# Patient Record
Sex: MALE | Race: WHITE | NOT HISPANIC OR LATINO | Employment: OTHER | URBAN - NONMETROPOLITAN AREA
[De-identification: names, ages, dates, MRNs, and addresses within clinical notes are randomized per-mention and may not be internally consistent; named-entity substitution may affect disease eponyms.]

---

## 2017-03-29 ENCOUNTER — COMMUNICATION - GICH (OUTPATIENT)
Dept: FAMILY MEDICINE | Facility: OTHER | Age: 62
End: 2017-03-29

## 2017-03-29 DIAGNOSIS — G62.9 POLYNEUROPATHY: ICD-10-CM

## 2017-04-10 ENCOUNTER — HISTORY (OUTPATIENT)
Dept: EMERGENCY MEDICINE | Facility: OTHER | Age: 62
End: 2017-04-10

## 2017-04-17 ENCOUNTER — OFFICE VISIT - GICH (OUTPATIENT)
Dept: FAMILY MEDICINE | Facility: OTHER | Age: 62
End: 2017-04-17

## 2017-04-17 ENCOUNTER — HISTORY (OUTPATIENT)
Dept: FAMILY MEDICINE | Facility: OTHER | Age: 62
End: 2017-04-17

## 2017-04-17 DIAGNOSIS — S09.90XD UNSPECIFIED INJURY OF HEAD, SUBSEQUENT ENCOUNTER: ICD-10-CM

## 2017-04-17 DIAGNOSIS — G62.9 POLYNEUROPATHY: ICD-10-CM

## 2017-04-27 ENCOUNTER — OFFICE VISIT - GICH (OUTPATIENT)
Dept: FAMILY MEDICINE | Facility: OTHER | Age: 62
End: 2017-04-27

## 2017-04-27 ENCOUNTER — HISTORY (OUTPATIENT)
Dept: FAMILY MEDICINE | Facility: OTHER | Age: 62
End: 2017-04-27

## 2017-04-27 DIAGNOSIS — Z00.00 ENCOUNTER FOR GENERAL ADULT MEDICAL EXAMINATION WITHOUT ABNORMAL FINDINGS: ICD-10-CM

## 2017-04-27 DIAGNOSIS — K70.30 ALCOHOLIC CIRRHOSIS OF LIVER WITHOUT ASCITES (H): ICD-10-CM

## 2017-04-27 DIAGNOSIS — Z13.6 ENCOUNTER FOR SCREENING FOR CARDIOVASCULAR DISORDERS: ICD-10-CM

## 2017-04-27 DIAGNOSIS — F10.10 UNCOMPLICATED ALCOHOL ABUSE: ICD-10-CM

## 2017-04-27 DIAGNOSIS — G62.1 ALCOHOLIC POLYNEUROPATHY (H): ICD-10-CM

## 2017-04-27 LAB
A/G RATIO - HISTORICAL: 1.2 (ref 1–2)
ABSOLUTE BASOPHILS - HISTORICAL: 0.1 THOU/CU MM
ABSOLUTE EOSINOPHILS - HISTORICAL: 0 THOU/CU MM
ABSOLUTE LYMPHOCYTES - HISTORICAL: 1.5 THOU/CU MM (ref 0.9–2.9)
ABSOLUTE MONOCYTES - HISTORICAL: 0.4 THOU/CU MM
ABSOLUTE NEUTROPHILS - HISTORICAL: 7.1 THOU/CU MM (ref 1.7–7)
ALBUMIN SERPL-MCNC: 4.1 G/DL (ref 3.5–5.7)
ALP SERPL-CCNC: 70 IU/L (ref 34–104)
ALT (SGPT) - HISTORICAL: 25 IU/L (ref 7–52)
ANION GAP - HISTORICAL: 14 (ref 5–18)
AST SERPL-CCNC: 32 IU/L (ref 13–39)
BACTERIA URINE: NORMAL BACTERIA/HPF
BASOPHILS # BLD AUTO: 0.8 %
BILIRUB SERPL-MCNC: 0.8 MG/DL (ref 0.3–1)
BILIRUB UR QL: ABNORMAL
BUN SERPL-MCNC: 16 MG/DL (ref 7–25)
BUN/CREAT RATIO - HISTORICAL: 24
CALCIUM SERPL-MCNC: 9.8 MG/DL (ref 8.6–10.3)
CHLORIDE SERPLBLD-SCNC: 97 MMOL/L (ref 98–107)
CHOL/HDL RATIO - HISTORICAL: 3.3
CHOLESTEROL TOTAL: 211 MG/DL
CLARITY, URINE: CLEAR CLARITY
CO2 SERPL-SCNC: 24 MMOL/L (ref 21–31)
COLOR UR: ABNORMAL COLOR
CREAT SERPL-MCNC: 0.68 MG/DL (ref 0.7–1.3)
EOSINOPHIL NFR BLD AUTO: 0.4 %
EPITHELIAL CELLS: NORMAL EPI/HPF
ERYTHROCYTE [DISTWIDTH] IN BLOOD BY AUTOMATED COUNT: 14.3 % (ref 11.5–15.5)
GFR IF NOT AFRICAN AMERICAN - HISTORICAL: >60 ML/MIN/1.73M2
GLOBULIN - HISTORICAL: 3.4 G/DL (ref 2–3.7)
GLUCOSE SERPL-MCNC: 122 MG/DL (ref 70–105)
GLUCOSE URINE: NEGATIVE MG/DL
HCT VFR BLD AUTO: 42.9 % (ref 37–53)
HDLC SERPL-MCNC: 64 MG/DL (ref 23–92)
HEMOGLOBIN: 13.6 G/DL (ref 13.5–17.5)
HYALINE CASTS: NORMAL /LPF
INR - HISTORICAL: 1.1
KETONES UR QL: ABNORMAL MG/DL
LDLC SERPL CALC-MCNC: 129 MG/DL
LEUKOCYTE ESTERASE URINE: NEGATIVE
LYMPHOCYTES NFR BLD AUTO: 16.5 % (ref 20–44)
MCH RBC QN AUTO: 34.3 PG (ref 26–34)
MCHC RBC AUTO-ENTMCNC: 31.7 G/DL (ref 32–36)
MCV RBC AUTO: 108 FL (ref 80–100)
MONOCYTES NFR BLD AUTO: 4.3 %
NEUTROPHILS NFR BLD AUTO: 78.1 % (ref 42–72)
NITRITE UR QL STRIP: NEGATIVE
NON-HDL CHOLESTEROL - HISTORICAL: 147 MG/DL
OCCULT BLOOD,URINE - HISTORICAL: NEGATIVE
OTHER: NORMAL
PATIENT STATUS - HISTORICAL: ABNORMAL
PH UR: 5 [PH]
PLATELET # BLD AUTO: 298 THOU/CU MM (ref 140–440)
PMV BLD: 7.4 FL (ref 6.5–11)
POTASSIUM SERPL-SCNC: 4.5 MMOL/L (ref 3.5–5.1)
PROT SERPL-MCNC: 7.5 G/DL (ref 6.4–8.9)
PROTEIN QUALITATIVE,URINE - HISTORICAL: 30 MG/DL
PROTIME - HISTORICAL: 11.6 SEC (ref 11.9–15.2)
RBC - HISTORICAL: NORMAL /HPF
RED BLOOD COUNT - HISTORICAL: 3.96 MIL/CU MM (ref 4.3–5.9)
SODIUM SERPL-SCNC: 135 MMOL/L (ref 133–143)
SP GR UR STRIP: >=1.03
TRIGL SERPL-MCNC: 91 MG/DL
UROBILINOGEN,QUALITATIVE - HISTORICAL: NORMAL EU/DL
WBC - HISTORICAL: NORMAL /HPF
WHITE BLOOD COUNT - HISTORICAL: 9 THOU/CU MM (ref 4.5–11)

## 2018-01-04 NOTE — NURSING NOTE
Patient Information     Patient Name MRN Prasanna Suárez [id number] Male 1955      Nursing Note by Vita Early at 2017  1:15 PM     Author:  Vita Early Service:  (none) Author Type:  (none)     Filed:  2017  1:53 PM Encounter Date:  2017 Status:  Signed     :  Vita Early            Patient presents to the clinic for an annual review.  Vita Early LPN....................2017 1:16 PM

## 2018-01-04 NOTE — NURSING NOTE
Patient Information     Patient Name MRN Prasanna Suárez [id number] Male 1955      Nursing Note by Vita Early at 2017  2:45 PM     Author:  Vita Early Service:  (none) Author Type:  (none)     Filed:  2017  3:09 PM Encounter Date:  2017 Status:  Signed     :  Vita Early            Patient presents to the clinic for a follow up after a head injury 3 weeks ago.    Vita Early LPN....................2017 2:50 PM

## 2018-01-04 NOTE — PROGRESS NOTES
Patient Information     Patient Name MRN Prasanna Suárez [id number] Male 1955      Progress Notes by Kervin Hopson MD at 2017  1:15 PM     Author:  Kervin Hopson MD Service:  (none) Author Type:  Physician     Filed:  2017  2:11 PM Encounter Date:  2017 Status:  Signed     :  Kervin Hopson MD (Physician)            See hp

## 2018-01-04 NOTE — H&P
Patient Information     Patient Name MRN Sex Prasanna Jones [id number] Male 1955      H&P by Kervin Hopson MD at 2017  1:15 PM     Author:  Kervin Hopson MD Service:  (none) Author Type:  Physician     Filed:  2017  2:11 PM Encounter Date:  2017 Status:  Signed     :  Kervin Hopson MD (Physician)            Nursing Notes:   Vita Early  2017  1:53 PM  Signed  Patient presents to the clinic for an annual review.  Vita Early LPN....................2017 1:16 PM    Prasanna Alonso is a 62 y.o. male who presents for   Chief Complaint     Patient presents with       Medication Management      Med review     HPI: Mr. Alonso comes in for health care maintenance. He is drinking and has been for a prolonged time again- at least since November he thinks. The peripheral neuropathy is stable and the gabapentin makes a + difference. He notices if he misses a dose. He is healing from facial contusion from fall . HE has not had colonoscopy but does not want to pursue it at this time. I discussed increase risk of bleeding with biopsy and liver disease. INR a year ago was good. HE is due for lab today.   Past Medical History:     Diagnosis  Date     Chemical dependency (HC)     etoh, tob      DJD (degenerative joint disease)     hands, back, hips      Frostbite     30 yr ago      Seizure (HC)     etoh WD      Past Surgical History:      Procedure  Laterality Date     stress frax bilat femur       No family history on file.  Current Outpatient Prescriptions       Medication  Sig Dispense Refill     acetaminophen (TYLENOL) 325 mg tablet Take 2 tablets by mouth every 4 hours if needed. Max acetaminophen dose: 4000mg in 24 hrs.       gabapentin (NEURONTIN) 300 mg capsule Take 1 capsule by mouth 3 times daily. 270 capsule 3     ibuprofen (ADVIL; MOTRIN) 200 mg tablet Take 400 mg by mouth every 6 hours.       No current facility-administered  "medications for this visit.      Medications have been reviewed by me and are current to the best of my knowledge and ability.    No Known Allergies  REVIEW OF SYSTEMS:  Constitutional: Ethanol related health issues keeping him mainly confined to his mothers home although he does the shopping  Eyes: stable  Ears, nose, mouth, throat, and face: Negative  Respiratory: Negative  Cardiovascular: Negative  Gastrointestinal: Negative  Genitourinary:Negative  Integument/breast: healing bruises  Hematologic/lymphatic: Negative  Musculoskeletal:has severe osteoarthritis hands and back/ left hip  Neurological: neuropathy stable  Psychiatric: denies issues  Allergic/Immunologic: Negative     EXAM:   Vitals:     04/27/17 1316   BP: 108/72   Pulse: (!) 118   Weight: 62.1 kg (137 lb)   Height: 1.651 m (5' 5\")     General Appearance: Pleasant, alert, appropriate appearance for age. No acute distress  Ear Exam: Normal TM's bilaterally. Normal auditory canals and external ears. Non-tender.  OroPharynx Exam: Dental hygiene adequate. Normal buccal mucosa. Normal pharynx.  Neck Exam: Supple, no masses or nodes.  Thyroid Exam: No nodules or enlargement.  Chest/Respiratory Exam: Normal chest wall and respirations. Clear to auscultation.  Cardiovascular Exam: Regular rate- 98 pulse/ and rhythm. S1, S2, no murmur, click, gallop, or rubs.  Gastrointestinal Exam: liver palpable 3 fb below costal margin  Lymphatic Exam: Non-palpable nodes in neck, clavicular, axillary, or inguinal regions.  Neurologic Exam: tremor noted in   Psychiatric Exam: Alert and oriented, appropriate affect.  ASSESSMENT AND PLAN:  1. Health care maintenance  Lab today    2. Alcoholic peripheral neuropathy (HC)  stable    3. Alcohol abuse  Look at coags/ he understands risks of continued etoh                 "

## 2018-01-04 NOTE — TELEPHONE ENCOUNTER
Patient Information     Patient Name MRN Prasanna Suárez [id number] Male 1955      Telephone Encounter by Abeba Macedo RN at 3/29/2017  2:53 PM     Author:  Abeba Macedo RN Service:  (none) Author Type:  NURS- Registered Nurse     Filed:  3/29/2017  3:07 PM Encounter Date:  3/29/2017 Status:  Signed     :  Abeba Macedo RN (NURS- Registered Nurse)            Nsaids    Office visit in the past 12 months or per provider note.    Last visit with DELFINA CHILDERS was on: 2016 in [facility]  Next visit with DELFINA CHILDERS is on: No future appointment listed with this provider  Next visit with Family Practice is on: No future appointment listed in this department    Max refill for 12 months from last office visit or per provider note.    Gabapentin/neuropathy last discussed 16.     Patient is due for medication management appointment. Limited refill provided at this time and letter sent for reminder to patient. Prescription refilled per RN Medication Refill Policy.................... Abeba Macedo RN ....................  3/29/2017   2:59 PM

## 2018-01-04 NOTE — PROGRESS NOTES
Patient Information     Patient Name MRN Sex Prasanna Jones [id number] Male 1955      Progress Notes by Kervin Hopson MD at 2017  2:45 PM     Author:  Kervin Hopson MD Service:  (none) Author Type:  Physician     Filed:  2017  3:19 PM Encounter Date:  2017 Status:  Signed     :  Kervin Hopson MD (Physician)            Nursing Notes:   Vita Early  2017  3:09 PM  Signed  Patient presents to the clinic for a follow up after a head injury 3 weeks ago.    Vita SHEILALala Early LPN....................2017 2:50 PM    Prasanna Alonso is a 62 y.o. male who presents for   Chief Complaint     Patient presents with       Head Injury      3 weeks ago     HPI: Mr. Alonso comes in 3 weeks follow up to face first fall on concrete when he slipped on ice in his garage; he was evaluated in EMERGENCY DEPARTMENT 2 weeks after the fall and discharged with no serious findings- Head CT was unremarkable. He last drank 2 days ago he states.   Past Medical History:     Diagnosis  Date     Chemical dependency (HC)     etoh, tob      DJD (degenerative joint disease)     hands, back, hips      Frostbite     30 yr ago      Seizure (HC) 2010    etoh WD      Past Surgical History:      Procedure  Laterality Date     stress frax bilat femur       No family history on file.  Current Outpatient Prescriptions       Medication  Sig Dispense Refill     acetaminophen (TYLENOL) 325 mg tablet Take 2 tablets by mouth every 4 hours if needed. Max acetaminophen dose: 4000mg in 24 hrs.       gabapentin (NEURONTIN) 300 mg capsule TAKE 1 CAPSULE BY MOUTH THREE TIMES DAILY 270 capsule 0     ibuprofen (ADVIL; MOTRIN) 200 mg tablet Take 400 mg by mouth every 6 hours.       No current facility-administered medications for this visit.      Medications have been reviewed by me and are current to the best of my knowledge and ability.    No Known Allergies    EXAM:   Vitals:     17 1451  "  BP: 92/52   Temp: 97.9  F (36.6  C)   TempSrc: Temporal   Weight: 61.4 kg (135 lb 6.4 oz)   Height: 1.7 m (5' 6.93\")     General Appearance: Pleasant, alert, appropriate appearance for age. No acute distress  Chest/Respiratory Exam: Normal chest wall and respirations. Clear to auscultation.  Cardiovascular Exam: Regular rate and rhythm. S1, S2, no murmur, click, gallop, or rubs.  Skin: bruising face and upper chest wall  ASSESSMENT AND PLAN:  1. Peripheral polyneuropathy (HC)    - gabapentin (NEURONTIN) 300 mg capsule; Take 1 capsule by mouth 3 times daily.  Dispense: 270 capsule; Refill: 3    2. Head trauma, subsequent encounter  Stable- no treatment needed/ no interest in detox at this time/ drinking a liter of vodka a week which he thinks is good                 "

## 2018-01-26 VITALS
SYSTOLIC BLOOD PRESSURE: 108 MMHG | HEART RATE: 118 BPM | DIASTOLIC BLOOD PRESSURE: 72 MMHG | BODY MASS INDEX: 22.82 KG/M2 | WEIGHT: 137 LBS | HEIGHT: 65 IN

## 2018-01-26 VITALS
BODY MASS INDEX: 21.25 KG/M2 | HEIGHT: 67 IN | SYSTOLIC BLOOD PRESSURE: 92 MMHG | WEIGHT: 135.4 LBS | DIASTOLIC BLOOD PRESSURE: 52 MMHG | TEMPERATURE: 97.9 F

## 2018-01-30 ENCOUNTER — DOCUMENTATION ONLY (OUTPATIENT)
Dept: FAMILY MEDICINE | Facility: OTHER | Age: 63
End: 2018-01-30

## 2018-01-30 RX ORDER — GABAPENTIN 300 MG/1
300 CAPSULE ORAL 3 TIMES DAILY
COMMUNITY
Start: 2017-04-17 | End: 2018-07-09

## 2018-01-30 RX ORDER — IBUPROFEN 200 MG
400 TABLET ORAL EVERY 6 HOURS
Status: ON HOLD | COMMUNITY
End: 2019-07-05

## 2018-01-30 RX ORDER — ACETAMINOPHEN 325 MG/1
2 TABLET ORAL EVERY 4 HOURS PRN
Status: ON HOLD | COMMUNITY
End: 2019-07-05

## 2018-07-09 DIAGNOSIS — G62.9 PERIPHERAL POLYNEUROPATHY: Primary | ICD-10-CM

## 2018-07-11 NOTE — TELEPHONE ENCOUNTER
In clinical absence of patient's primary, Kervin Hopson, patient is requesting that this message be sent to the Doc of the Day for consideration please.     Patient states he is OUT OF MEDICATION.    Last OV 4/27/17. Patient needs to be seen every 12 months for refills of this medication. Called and spoke to Patient after verifying last name and date of birth. Patient transferred to scheduling line to set up appointment for annual exam with Dr. Hopson:    Future Office visit:    Next 5 appointments (look out 90 days)     Jul 26, 2018  1:15 PM CDT   PHYSICAL with Kervin Hopson MD   [facility] ([facility])    [address]  [facility] [id number] [phone]                GABAPENTIN 300 MG CAPSULE   Last Written Prescription Date:  4/17/17  Last Fill Quantity: 270,   # refills: 3  Last Office Visit: 4/27/17    Routing refill request to provider for review/approval because:  Drug not on the G, P or [facility] refill protocol or controlled substance    Unable to complete prescription refill per RN Medication Refill Policy. Gerri Johnson RN .............. 7/11/2018  4:01 PM

## 2018-07-12 RX ORDER — GABAPENTIN 300 MG/1
CAPSULE ORAL
Qty: 90 CAPSULE | Refills: 0 | Status: SHIPPED | OUTPATIENT
Start: 2018-07-12 | End: 2018-07-26

## 2018-07-23 NOTE — PROGRESS NOTES
Patient Information     Patient Name  Prasanna Alonso MRN  [id number] Sex  Male   1955      Letter by Kervin Hopson MD at      Author:  Kervin Hopson MD Service:  (none) Author Type:  (none)    Filed:   Encounter Date:  3/29/2017 Status:  (Other)           Prasanna Alonso  [address] [place] [address]          2017    Dear Mr. Alonso:    This letter is to remind you that you are due for your annual exam with Kervin Hopson MD. Your last comprehensive visit was more than 12 months ago.    A LIMITED refill of gabapentin (NEURONTIN) 300 mg capsule has been called into your pharmacy. Additional refills require you to complete this appointment.    Please call the clinic at [phone] to schedule your appointment.    Thank you for choosing [facility] and [facility] for your health care needs.    Sincerely,    Refill RN  [facility]

## 2018-07-23 NOTE — PROGRESS NOTES
Patient Information     Patient Name  Prasanna Alonso MRN  [id number] Sex  Male   1955      Letter by Kervin Hopson MD at      Author:  Kervin Hopson MD Service:  (none) Author Type:  (none)    Filed:   Encounter Date:  2017 Status:  (Other)           Prasanna Alonso  [address] [place] [address]          May 2, 2017    Dear Mr. Alonso:    Your labs showed no worrisome findings.   Kervin Hopson MD ....................  2017   8:04 AM     Results for orders placed or performed in visit on 17       COMP METABOLIC PANEL       Result  Value Ref Range Status    SODIUM 135 133 - 143 mmol/L Final    POTASSIUM 4.5 3.5 - 5.1 mmol/L Final    CHLORIDE 97 (L) 98 - 107 mmol/L Final    CO2,TOTAL 24 21 - 31 mmol/L Final    ANION GAP 14 5 - 18                 Final    GLUCOSE 122 (H) 70 - 105 mg/dL Final    CALCIUM 9.8 8.6 - 10.3 mg/dL Final    BUN 16 7 - 25 mg/dL Final    CREATININE 0.68 (L) 0.70 - 1.30 mg/dL Final    BUN/CREAT RATIO           24                 Final    GFR if African American >60 >60 ml/min/1.73m2 Final    GFR if not African American >60 >60 ml/min/1.73m2 Final    ALBUMIN 4.1 3.5 - 5.7 g/dL Final    PROTEIN,TOTAL 7.5 6.4 - 8.9 g/dL Final    GLOBULIN                  3.4 2.0 - 3.7 g/dL Final    A/G RATIO 1.2 1.0 - 2.0                 Final    BILIRUBIN,TOTAL 0.8 0.3 - 1.0 mg/dL Final    ALK PHOSPHATASE 70 34 - 104 IU/L Final    ALT (SGPT) 25 7 - 52 IU/L Final    AST (SGOT) 32 13 - 39 IU/L Final   LIPID PANEL       Result  Value Ref Range Status    CHOLESTEROL,TOTAL 211 (H) <200 mg/dL Final    TRIGLYCERIDES 91 <150 mg/dL Final    HDL CHOLESTEROL 64 23 - 92 mg/dL Final    NON-HDL CHOLESTEROL 147 (H) <145 mg/dl Final    CHOL/HDL RATIO            3.30 <4.50                 Final    LDL CHOLESTEROL 129 (H) <100 mg/dL Final    PATIENT STATUS            FASTING                 Final   PROTIME-INR       Result  Value Ref Range Status    INR 1.1 <1.3 Final     PROTIME 11.6 (L) 11.9 - 15.2 sec Final   CBC WITH AUTO DIFFERENTIAL       Result  Value Ref Range Status    WHITE BLOOD COUNT         9.0 4.5 - 11.0 thou/cu mm Final    RED BLOOD COUNT           3.96 (L) 4.30 - 5.90 mil/cu mm Final    HEMOGLOBIN                13.6 13.5 - 17.5 g/dL Final    HEMATOCRIT                42.9 37.0 - 53.0 % Final    MCV                       108 (H) 80 - 100 fL Final    MCH                       34.3 (H) 26.0 - 34.0 pg Final    MCHC                      31.7 (L) 32.0 - 36.0 g/dL Final    RDW                       14.3 11.5 - 15.5 % Final    PLATELET COUNT            298 140 - 440 thou/cu mm Final    MPV                       7.4 6.5 - 11.0 fL Final    NEUTROPHILS               78.1 (H) 42.0 - 72.0 % Final    LYMPHOCYTES               16.5 (L) 20.0 - 44.0 % Final    MONOCYTES                 4.3 <12.0 % Final    EOSINOPHILS               0.4 <8.0 % Final    BASOPHILS                 0.8 <3.0 % Final    ABSOLUTE NEUTROPHILS      7.1 (H) 1.7 - 7.0 thou/cu mm Final    ABSOLUTE LYMPHOCYTES      1.5 0.9 - 2.9 thou/cu mm Final    ABSOLUTE MONOCYTES        0.4 <0.9 thou/cu mm Final    ABSOLUTE EOSINOPHILS      0.0 <0.5 thou/cu mm Final    ABSOLUTE BASOPHILS        0.1 <0.3 thou/cu mm Final   URINALYSIS W REFLEX MICROSCOPIC IF POSITIVE       Result  Value Ref Range Status    COLOR                     Orange (A) Yellow Color Final    CLARITY                   Clear Clear Clarity Final    SPECIFIC GRAVITY,URINE    >=1.030 (A) 1.010, 1.015, 1.020, 1.025                 Final    PH,URINE                  5.0 (A) 6.0, 7.0, 8.0, 5.5, 6.5, 7.5, 8.5                 Final    UROBILINOGEN,QUALITATIVE  Normal Normal EU/dl Final    PROTEIN, URINE 30 (A) Negative mg/dL Final    GLUCOSE, URINE Negative Negative mg/dL Final    KETONES,URINE             Trace (A) Negative mg/dL Final    BILIRUBIN,URINE           Abnormal (A) Negative                 Final    OCCULT BLOOD,URINE        Negative Negative                  Final    NITRITE                   Negative Negative                 Final    LEUKOCYTE ESTERASE        Negative Negative                 Final   URINALYSIS MICROSCOPIC       Result  Value Ref Range Status    RBC 0-2 0-2, None Seen /HPF Final    WBC 0-2 0-2, 3-5, None Seen /HPF Final    BACTERIA                  Few None Seen, Rare, Occasional, Few Bacteria/HPF Final    EPITHELIAL CELLS          Few None Seen, Few Epi/HPF Final    OTHER Mucus Present  Final    HYALINE CASTS 0-2 0-2, 3-5 /LPF            Final

## 2018-07-26 ENCOUNTER — OFFICE VISIT (OUTPATIENT)
Dept: FAMILY MEDICINE | Facility: OTHER | Age: 63
End: 2018-07-26
Attending: FAMILY MEDICINE
Payer: MEDICARE

## 2018-07-26 VITALS
HEIGHT: 65 IN | HEART RATE: 88 BPM | TEMPERATURE: 98.2 F | SYSTOLIC BLOOD PRESSURE: 114 MMHG | WEIGHT: 142.25 LBS | DIASTOLIC BLOOD PRESSURE: 80 MMHG | BODY MASS INDEX: 23.7 KG/M2

## 2018-07-26 DIAGNOSIS — F17.200 TOBACCO USE DISORDER: ICD-10-CM

## 2018-07-26 DIAGNOSIS — G62.1 ALCOHOLIC PERIPHERAL NEUROPATHY (H): Primary | ICD-10-CM

## 2018-07-26 DIAGNOSIS — M19.041 PRIMARY OSTEOARTHRITIS OF BOTH HANDS: ICD-10-CM

## 2018-07-26 DIAGNOSIS — M19.042 PRIMARY OSTEOARTHRITIS OF BOTH HANDS: ICD-10-CM

## 2018-07-26 LAB
ALBUMIN SERPL-MCNC: 4.4 G/DL (ref 3.5–5.7)
ALBUMIN UR-MCNC: 30 MG/DL
ALP SERPL-CCNC: 50 U/L (ref 34–104)
ALT SERPL W P-5'-P-CCNC: 38 U/L (ref 7–52)
ANION GAP SERPL CALCULATED.3IONS-SCNC: 12 MMOL/L (ref 3–14)
APPEARANCE UR: CLEAR
AST SERPL W P-5'-P-CCNC: 66 U/L (ref 13–39)
BACTERIA #/AREA URNS HPF: ABNORMAL /HPF
BILIRUB SERPL-MCNC: 0.9 MG/DL (ref 0.3–1)
BILIRUB UR QL STRIP: ABNORMAL
BUN SERPL-MCNC: 6 MG/DL (ref 7–25)
CALCIUM SERPL-MCNC: 10 MG/DL (ref 8.6–10.3)
CHLORIDE SERPL-SCNC: 102 MMOL/L (ref 98–107)
CO2 SERPL-SCNC: 26 MMOL/L (ref 21–31)
COLOR UR AUTO: YELLOW
CREAT SERPL-MCNC: 0.66 MG/DL (ref 0.7–1.3)
ERYTHROCYTE [DISTWIDTH] IN BLOOD BY AUTOMATED COUNT: 14.6 % (ref 10–15)
GFR SERPL CREATININE-BSD FRML MDRD: >90 ML/MIN/1.7M2
GLUCOSE SERPL-MCNC: 94 MG/DL (ref 70–105)
GLUCOSE UR STRIP-MCNC: NEGATIVE MG/DL
HCT VFR BLD AUTO: 43.3 % (ref 40–53)
HGB BLD-MCNC: 14.8 G/DL (ref 13.3–17.7)
HGB UR QL STRIP: NEGATIVE
INR PPP: 0.98 (ref 0–1.3)
KETONES UR STRIP-MCNC: 15 MG/DL
LEUKOCYTE ESTERASE UR QL STRIP: ABNORMAL
MCH RBC QN AUTO: 35.2 PG (ref 26.5–33)
MCHC RBC AUTO-ENTMCNC: 34.2 G/DL (ref 31.5–36.5)
MCV RBC AUTO: 103 FL (ref 78–100)
MUCOUS THREADS #/AREA URNS LPF: PRESENT /LPF
NITRATE UR QL: POSITIVE
PH UR STRIP: 5 PH (ref 5–9)
PLATELET # BLD AUTO: 196 10E9/L (ref 150–450)
POTASSIUM SERPL-SCNC: 4 MMOL/L (ref 3.5–5.1)
PROT SERPL-MCNC: 8 G/DL (ref 6.4–8.9)
RBC # BLD AUTO: 4.21 10E12/L (ref 4.4–5.9)
RBC #/AREA URNS AUTO: ABNORMAL /HPF
SODIUM SERPL-SCNC: 140 MMOL/L (ref 134–144)
SOURCE: ABNORMAL
SP GR UR STRIP: >1.03 (ref 1–1.03)
UROBILINOGEN UR STRIP-ACNC: 1 EU/DL (ref 0.2–1)
WBC # BLD AUTO: 6.1 10E9/L (ref 4–11)
WBC #/AREA URNS AUTO: ABNORMAL /HPF

## 2018-07-26 PROCEDURE — 81001 URINALYSIS AUTO W/SCOPE: CPT | Performed by: FAMILY MEDICINE

## 2018-07-26 PROCEDURE — G0463 HOSPITAL OUTPT CLINIC VISIT: HCPCS

## 2018-07-26 PROCEDURE — 99214 OFFICE O/P EST MOD 30 MIN: CPT | Performed by: FAMILY MEDICINE

## 2018-07-26 PROCEDURE — 80053 COMPREHEN METABOLIC PANEL: CPT | Performed by: FAMILY MEDICINE

## 2018-07-26 PROCEDURE — 36415 COLL VENOUS BLD VENIPUNCTURE: CPT | Performed by: FAMILY MEDICINE

## 2018-07-26 PROCEDURE — 85610 PROTHROMBIN TIME: CPT | Mod: GZ | Performed by: FAMILY MEDICINE

## 2018-07-26 PROCEDURE — 85027 COMPLETE CBC AUTOMATED: CPT | Performed by: FAMILY MEDICINE

## 2018-07-26 RX ORDER — GABAPENTIN 300 MG/1
300 CAPSULE ORAL 4 TIMES DAILY
Qty: 120 CAPSULE | Refills: 11 | Status: SHIPPED | OUTPATIENT
Start: 2018-07-26 | End: 2020-06-09

## 2018-07-26 ASSESSMENT — PAIN SCALES - GENERAL: PAINLEVEL: MODERATE PAIN (4)

## 2018-07-26 NOTE — PROGRESS NOTES
"  SUBJECTIVE:   Prasanna Alonso is a 63 year old male who presents to clinic today for the following health issues:  Comes in for health care maintenance/ he continues to drink etoh, although he does not drink a liter a day now. He has peripheral neuropathy related tor etoh abuse and he is getting good relief but it does not last long enough to just take it TID/ he would like it QID. Order changed accordingly. He smokes 1/2 ppd.             Problem list and histories reviewed & adjusted, as indicated.  Additional history: as documented        Reviewed and updated as needed this visit by clinical staff  Tobacco  Allergies  Meds  Med Hx       Reviewed and updated as needed this visit by Provider         ROS:  CONSTITUTIONAL: NEGATIVE for fever, chills, change in weight  ENT/MOUTH: NEGATIVE for ear, mouth and throat problems  RESP: NEGATIVE for significant cough or SOB  CV: NEGATIVE for chest pain, palpitations or peripheral edema  GI: NEGATIVE for nausea, abdominal pain, heartburn, or change in bowel habits  : NEGATIVE for frequency, dysuria, or hematuria  MUSCULOSKELETAL: NEGATIVE for significant arthralgias or myalgia  NEURO: NEGATIVE for weakness, dizziness or paresthesias    OBJECTIVE:     /80  Pulse 88  Temp 98.2  F (36.8  C)  Ht 5' 4.5\" (1.638 m)  Wt 142 lb 4 oz (64.5 kg)  BMI 24.04 kg/m2  Body mass index is 24.04 kg/(m^2).  GENERAL: healthy, alert and no distress/ smells of ETOH  NECK: no adenopathy, no asymmetry, masses, or scars and thyroid normal to palpation  RESP: lungs clear to auscultation - no rales, rhonchi or wheezes  CV: regular rate and rhythm, normal S1 S2, no S3 or S4, no murmur, click or rub, no peripheral edema and peripheral pulses strong  ABDOMEN: soft, nontender, no hepatosplenomegaly, no masses and bowel sounds normal  MS: has deformed finger w OA        ASSESSMENT/PLAN:           1. Alcoholic peripheral neuropathy (H)    - gabapentin (NEURONTIN) 300 MG capsule; Take 1 " capsule (300 mg) by mouth 4 times daily  Dispense: 120 capsule; Refill: 11  - CBC with platelets; Future  - Comprehensive metabolic panel; Future  - *UA reflex to Microscopic    2. Tobacco use disorder      3. Primary osteoarthritis of both hands        See Patient Instructions    DELFINA CHILDERS MD  [facility]

## 2018-07-26 NOTE — MR AVS SNAPSHOT
"              After Visit Summary   7/26/2018    Prasanna Alonso    MRN: [id number]           Patient Information     Date Of Birth          1955        Visit Information        Provider Department      7/26/2018 1:15 PM Kervin Hopson MD [facility]        Today's Diagnoses     Alcoholic peripheral neuropathy (H)    -  1    Tobacco use disorder        Primary osteoarthritis of both hands           Follow-ups after your visit        Future tests that were ordered for you today     Open Future Orders        Priority Expected Expires Ordered    CBC with platelets Routine  7/27/2019 7/26/2018    Comprehensive metabolic panel Routine  7/27/2019 7/26/2018            Who to contact     If you have questions or need follow up information about today's clinic visit or your schedule please contact [facility] directly at [phone].  Normal or non-critical lab and imaging results will be communicated to you by MyChart, letter or phone within 4 business days after the clinic has received the results. If you do not hear from us within 7 days, please contact the clinic through MyChart or phone. If you have a critical or abnormal lab result, we will notify you by phone as soon as possible.  Submit refill requests through [employer] or call your pharmacy and they will forward the refill request to us. Please allow 3 business days for your refill to be completed.          Additional Information About Your Visit        Care EveryWhere ID     This is your Care EveryWhere ID. This could be used by other organizations to access your [place] medical records  [id number]        Your Vitals Were     Pulse Temperature Height BMI (Body Mass Index)          88 98.2  F (36.8  C) 5' 4.5\" (1.638 m) 24.04 kg/m2         Blood Pressure from Last 3 Encounters:   07/26/18 114/80   04/27/17 108/72   04/17/17 92/52    Weight from Last 3 Encounters:   07/26/18 142 lb 4 oz (64.5 kg)   04/27/17 137 " lb (62.1 kg)   04/17/17 135 lb 6.4 oz (61.4 kg)              We Performed the Following     *UA reflex to Microscopic          Today's Medication Changes          These changes are accurate as of 7/26/18  1:48 PM.  If you have any questions, ask your nurse or doctor.               These medicines have changed or have updated prescriptions.        Dose/Directions    gabapentin 300 MG capsule   Commonly known as:  NEURONTIN   This may have changed:  See the new instructions.   Used for:  Alcoholic peripheral neuropathy (H)   Changed by:  Kervin Hopson MD        Dose:  300 mg   Take 1 capsule (300 mg) by mouth 4 times daily   Quantity:  120 capsule   Refills:  11            Where to get your medicines      These medications were sent to [facility] - [place], [place] - [address]  [address], [facility] [address]     Phone:  [phone]     gabapentin 300 MG capsule                Primary Care Provider Office Phone # Fax #    Kervin Hopson [phone]       [address]  [facility] [address]        Equal Access to Services     [facility]: Hadii aad ku hadasho Soomaali, waaxda luqadaha, qaybta kaalmada adeegyada, waxay alivia hayzane duque . So [facility] [phone].    ATENCIÓN: Si habla español, tiene a singh disposición servicios gratuitos de asistencia lingüística. Llame al [phone].    We comply with applicable federal civil rights laws and [place] laws. We do not discriminate on the basis of race, color, national origin, age, disability, sex, sexual orientation, or gender identity.            Thank you!     Thank you for choosing [facility]  for your care. Our goal is always to provide you with excellent care. Hearing back from our patients is one way we can continue to improve our services. Please take a few minutes to complete the written survey that you may receive in the mail after your visit with  us. Thank you!             Your Updated Medication List - Protect others around you: Learn how to safely use, store and throw away your medicines at www.disposemymeds.org.          This list is accurate as of 7/26/18  1:48 PM.  Always use your most recent med list.                   Brand Name Dispense Instructions for use Diagnosis    acetaminophen 325 MG tablet    TYLENOL     Take 2 tablets by mouth every 4 hours as needed Max acetaminophen dose: 4000mg in 24 hrs        gabapentin 300 MG capsule    NEURONTIN    120 capsule    Take 1 capsule (300 mg) by mouth 4 times daily    Alcoholic peripheral neuropathy (H)       ibuprofen 200 MG tablet    ADVIL/MOTRIN     Take 400 mg by mouth every 6 hours

## 2018-07-26 NOTE — LETTER
July 26, 2018      Prasanna Alonso  [address] [facility]   [facility] [address]        Dear ,    We are writing to inform you of your test results.    Your labs are stable . No worrisome findings.  DELFINA CHILDERS MD on 7/26/2018 at 3:58 PM     Resulted Orders   *UA reflex to Microscopic   Result Value Ref Range    Color Urine Yellow     Appearance Urine Clear     Glucose Urine Negative NEG^Negative mg/dL    Bilirubin Urine Moderate (A) NEG^Negative      Comment:      This is an unconfirmed screening test result. A positive result may be false.    Ketones Urine 15 (A) NEG^Negative mg/dL    Specific Gravity Urine >1.030 (H) 1.000 - 1.030    Blood Urine Negative NEG^Negative    pH Urine 5.0 5.0 - 9.0 pH    Protein Albumin Urine 30 (A) NEG^Negative mg/dL    Urobilinogen Urine 1.0 0.2 - 1.0 EU/dL    Nitrite Urine Positive (A) NEG^Negative    Leukocyte Esterase Urine Trace (A) NEG^Negative    Source Midstream Urine    CBC with platelets   Result Value Ref Range    WBC 6.1 4.0 - 11.0 10e9/L    RBC Count 4.21 (L) 4.4 - 5.9 10e12/L    Hemoglobin 14.8 13.3 - 17.7 g/dL    Hematocrit 43.3 40.0 - 53.0 %     (H) 78 - 100 fl    MCH 35.2 (H) 26.5 - 33.0 pg    MCHC 34.2 31.5 - 36.5 g/dL    RDW 14.6 10.0 - 15.0 %    Platelet Count 196 150 - 450 10e9/L   Comprehensive metabolic panel   Result Value Ref Range    Sodium 140 134 - 144 mmol/L    Potassium 4.0 3.5 - 5.1 mmol/L    Chloride 102 98 - 107 mmol/L    Carbon Dioxide 26 21 - 31 mmol/L    Anion Gap 12 3 - 14 mmol/L    Glucose 94 70 - 105 mg/dL    Urea Nitrogen 6 (L) 7 - 25 mg/dL    Creatinine 0.66 (L) 0.70 - 1.30 mg/dL    GFR Estimate >90 >60 mL/min/1.7m2    GFR Estimate If Black >90 >60 mL/min/1.7m2    Calcium 10.0 8.6 - 10.3 mg/dL    Bilirubin Total 0.9 0.3 - 1.0 mg/dL    Albumin 4.4 3.5 - 5.7 g/dL    Protein Total 8.0 6.4 - 8.9 g/dL    Alkaline Phosphatase 50 34 - 104 U/L    ALT 38 7 - 52 U/L    AST 66 (H) 13 - 39 U/L   INR   Result Value Ref Range     INR 0.98 0 - 1.3   Urine Microscopic   Result Value Ref Range    WBC Urine 0 - 5 OTO5^0 - 5 /HPF    RBC Urine O - 2 OTO2^O - 2 /HPF    Bacteria Urine Few (A) NEG^Negative /HPF    Mucous Urine Present (A) NEG^Negative /LPF       If you have any questions or concerns, please call the clinic at the number listed above.       Sincerely,        DELFINA CHILDERS MD

## 2019-07-04 ENCOUNTER — HOSPITAL ENCOUNTER (INPATIENT)
Facility: OTHER | Age: 64
LOS: 4 days | Discharge: HOME | DRG: 378 | End: 2019-07-08
Attending: EMERGENCY MEDICINE | Admitting: INTERNAL MEDICINE
Payer: MEDICARE

## 2019-07-04 DIAGNOSIS — G62.1 ALCOHOLIC PERIPHERAL NEUROPATHY (H): ICD-10-CM

## 2019-07-04 DIAGNOSIS — F10.10 ALCOHOL ABUSE: Primary | ICD-10-CM

## 2019-07-04 DIAGNOSIS — K92.2 GASTROINTESTINAL HEMORRHAGE, UNSPECIFIED GASTROINTESTINAL HEMORRHAGE TYPE: ICD-10-CM

## 2019-07-04 DIAGNOSIS — F17.200 TOBACCO USE DISORDER: ICD-10-CM

## 2019-07-04 DIAGNOSIS — K92.1 GASTROINTESTINAL HEMORRHAGE WITH MELENA: ICD-10-CM

## 2019-07-04 DIAGNOSIS — K92.1 MELENA: ICD-10-CM

## 2019-07-04 DIAGNOSIS — K92.2 UGIB (UPPER GASTROINTESTINAL BLEED): ICD-10-CM

## 2019-07-04 LAB
ABO + RH BLD: NORMAL
ABO + RH BLD: NORMAL
ALBUMIN SERPL-MCNC: 3.8 G/DL (ref 3.5–5.7)
ALP SERPL-CCNC: 34 U/L (ref 34–104)
ALT SERPL W P-5'-P-CCNC: 13 U/L (ref 7–52)
ANION GAP SERPL CALCULATED.3IONS-SCNC: 24 MMOL/L (ref 3–14)
AST SERPL W P-5'-P-CCNC: 17 U/L (ref 13–39)
BASOPHILS # BLD AUTO: 0 10E9/L (ref 0–0.2)
BASOPHILS NFR BLD AUTO: 0.4 %
BILIRUB SERPL-MCNC: 0.9 MG/DL (ref 0.3–1)
BLD GP AB SCN SERPL QL: NORMAL
BLOOD BANK CMNT PATIENT-IMP: NORMAL
BUN SERPL-MCNC: 11 MG/DL (ref 7–25)
CALCIUM SERPL-MCNC: 9.3 MG/DL (ref 8.6–10.3)
CHLORIDE SERPL-SCNC: 90 MMOL/L (ref 98–107)
CO2 SERPL-SCNC: 17 MMOL/L (ref 21–31)
CREAT SERPL-MCNC: 0.69 MG/DL (ref 0.7–1.3)
DIFFERENTIAL METHOD BLD: ABNORMAL
EOSINOPHIL # BLD AUTO: 0 10E9/L (ref 0–0.7)
EOSINOPHIL NFR BLD AUTO: 0.4 %
ERYTHROCYTE [DISTWIDTH] IN BLOOD BY AUTOMATED COUNT: 13.5 % (ref 10–15)
GFR SERPL CREATININE-BSD FRML MDRD: >90 ML/MIN/{1.73_M2}
GLUCOSE SERPL-MCNC: 112 MG/DL (ref 70–105)
HCT VFR BLD AUTO: 32.1 % (ref 40–53)
HGB BLD-MCNC: 10.8 G/DL (ref 13.3–17.7)
IMM GRANULOCYTES # BLD: 0 10E9/L (ref 0–0.4)
IMM GRANULOCYTES NFR BLD: 0.6 %
INR PPP: 1.02 (ref 0–1.3)
LYMPHOCYTES # BLD AUTO: 0.8 10E9/L (ref 0.8–5.3)
LYMPHOCYTES NFR BLD AUTO: 16.7 %
MCH RBC QN AUTO: 33 PG (ref 26.5–33)
MCHC RBC AUTO-ENTMCNC: 33.6 G/DL (ref 31.5–36.5)
MCV RBC AUTO: 98 FL (ref 78–100)
MONOCYTES # BLD AUTO: 0.7 10E9/L (ref 0–1.3)
MONOCYTES NFR BLD AUTO: 14.4 %
NEUTROPHILS # BLD AUTO: 3.3 10E9/L (ref 1.6–8.3)
NEUTROPHILS NFR BLD AUTO: 67.5 %
PLATELET # BLD AUTO: 222 10E9/L (ref 150–450)
POTASSIUM SERPL-SCNC: 2.7 MMOL/L (ref 3.5–5.1)
PROT SERPL-MCNC: 6.4 G/DL (ref 6.4–8.9)
RBC # BLD AUTO: 3.27 10E12/L (ref 4.4–5.9)
SODIUM SERPL-SCNC: 131 MMOL/L (ref 134–144)
SPECIMEN EXP DATE BLD: NORMAL
WBC # BLD AUTO: 4.9 10E9/L (ref 4–11)

## 2019-07-04 PROCEDURE — 25000128 H RX IP 250 OP 636: Performed by: EMERGENCY MEDICINE

## 2019-07-04 PROCEDURE — 99291 CRITICAL CARE FIRST HOUR: CPT | Mod: 25 | Performed by: EMERGENCY MEDICINE

## 2019-07-04 PROCEDURE — C9113 INJ PANTOPRAZOLE SODIUM, VIA: HCPCS | Performed by: EMERGENCY MEDICINE

## 2019-07-04 PROCEDURE — 25800030 ZZH RX IP 258 OP 636: Performed by: EMERGENCY MEDICINE

## 2019-07-04 PROCEDURE — 85025 COMPLETE CBC W/AUTO DIFF WBC: CPT | Performed by: EMERGENCY MEDICINE

## 2019-07-04 PROCEDURE — 25800030 ZZH RX IP 258 OP 636: Performed by: INTERNAL MEDICINE

## 2019-07-04 PROCEDURE — 96375 TX/PRO/DX INJ NEW DRUG ADDON: CPT | Mod: XU | Performed by: EMERGENCY MEDICINE

## 2019-07-04 PROCEDURE — 96365 THER/PROPH/DIAG IV INF INIT: CPT | Mod: XU | Performed by: EMERGENCY MEDICINE

## 2019-07-04 PROCEDURE — 25000132 ZZH RX MED GY IP 250 OP 250 PS 637: Performed by: FAMILY MEDICINE

## 2019-07-04 PROCEDURE — 36415 COLL VENOUS BLD VENIPUNCTURE: CPT | Performed by: EMERGENCY MEDICINE

## 2019-07-04 PROCEDURE — 12000000 ZZH R&B MED SURG/OB

## 2019-07-04 PROCEDURE — 86901 BLOOD TYPING SEROLOGIC RH(D): CPT | Performed by: EMERGENCY MEDICINE

## 2019-07-04 PROCEDURE — 25000132 ZZH RX MED GY IP 250 OP 250 PS 637: Performed by: INTERNAL MEDICINE

## 2019-07-04 PROCEDURE — 85610 PROTHROMBIN TIME: CPT | Performed by: EMERGENCY MEDICINE

## 2019-07-04 PROCEDURE — 99291 CRITICAL CARE FIRST HOUR: CPT | Mod: Z6 | Performed by: EMERGENCY MEDICINE

## 2019-07-04 PROCEDURE — 80053 COMPREHEN METABOLIC PANEL: CPT | Performed by: EMERGENCY MEDICINE

## 2019-07-04 PROCEDURE — 99223 1ST HOSP IP/OBS HIGH 75: CPT | Mod: AI | Performed by: INTERNAL MEDICINE

## 2019-07-04 PROCEDURE — 86850 RBC ANTIBODY SCREEN: CPT | Performed by: EMERGENCY MEDICINE

## 2019-07-04 PROCEDURE — 86900 BLOOD TYPING SEROLOGIC ABO: CPT | Performed by: EMERGENCY MEDICINE

## 2019-07-04 RX ORDER — POTASSIUM CHLORIDE 7.45 MG/ML
10 INJECTION INTRAVENOUS
Status: DISCONTINUED | OUTPATIENT
Start: 2019-07-04 | End: 2019-07-05

## 2019-07-04 RX ORDER — MAGNESIUM SULFATE HEPTAHYDRATE 40 MG/ML
4 INJECTION, SOLUTION INTRAVENOUS EVERY 4 HOURS PRN
Status: DISCONTINUED | OUTPATIENT
Start: 2019-07-04 | End: 2019-07-05

## 2019-07-04 RX ORDER — FOLIC ACID 5 MG/ML
1 INJECTION, SOLUTION INTRAMUSCULAR; INTRAVENOUS; SUBCUTANEOUS ONCE
Status: DISCONTINUED | OUTPATIENT
Start: 2019-07-04 | End: 2019-07-05 | Stop reason: RX

## 2019-07-04 RX ORDER — POTASSIUM CHLORIDE 1500 MG/1
20-40 TABLET, EXTENDED RELEASE ORAL
Status: DISCONTINUED | OUTPATIENT
Start: 2019-07-04 | End: 2019-07-05

## 2019-07-04 RX ORDER — LIDOCAINE 40 MG/G
CREAM TOPICAL
Status: DISCONTINUED | OUTPATIENT
Start: 2019-07-04 | End: 2019-07-08 | Stop reason: HOSPADM

## 2019-07-04 RX ORDER — SODIUM CHLORIDE 9 MG/ML
INJECTION, SOLUTION INTRAVENOUS CONTINUOUS
Status: DISCONTINUED | OUTPATIENT
Start: 2019-07-04 | End: 2019-07-08 | Stop reason: HOSPADM

## 2019-07-04 RX ORDER — NALOXONE HYDROCHLORIDE 0.4 MG/ML
.1-.4 INJECTION, SOLUTION INTRAMUSCULAR; INTRAVENOUS; SUBCUTANEOUS
Status: DISCONTINUED | OUTPATIENT
Start: 2019-07-04 | End: 2019-07-08 | Stop reason: HOSPADM

## 2019-07-04 RX ORDER — THIAMINE HYDROCHLORIDE 100 MG/ML
100 INJECTION, SOLUTION INTRAMUSCULAR; INTRAVENOUS ONCE
Status: COMPLETED | OUTPATIENT
Start: 2019-07-04 | End: 2019-07-04

## 2019-07-04 RX ORDER — ONDANSETRON 2 MG/ML
4 INJECTION INTRAMUSCULAR; INTRAVENOUS EVERY 6 HOURS PRN
Status: DISCONTINUED | OUTPATIENT
Start: 2019-07-04 | End: 2019-07-08 | Stop reason: HOSPADM

## 2019-07-04 RX ORDER — PROCHLORPERAZINE MALEATE 10 MG
10 TABLET ORAL EVERY 6 HOURS PRN
Status: DISCONTINUED | OUTPATIENT
Start: 2019-07-04 | End: 2019-07-08 | Stop reason: HOSPADM

## 2019-07-04 RX ORDER — NICOTINE 21 MG/24HR
1 PATCH, TRANSDERMAL 24 HOURS TRANSDERMAL DAILY PRN
Status: DISCONTINUED | OUTPATIENT
Start: 2019-07-04 | End: 2019-07-08 | Stop reason: HOSPADM

## 2019-07-04 RX ORDER — ONDANSETRON 4 MG/1
4 TABLET, ORALLY DISINTEGRATING ORAL EVERY 6 HOURS PRN
Status: DISCONTINUED | OUTPATIENT
Start: 2019-07-04 | End: 2019-07-08 | Stop reason: HOSPADM

## 2019-07-04 RX ORDER — PROCHLORPERAZINE 25 MG
25 SUPPOSITORY, RECTAL RECTAL EVERY 12 HOURS PRN
Status: DISCONTINUED | OUTPATIENT
Start: 2019-07-04 | End: 2019-07-08 | Stop reason: HOSPADM

## 2019-07-04 RX ADMIN — NICOTINE 1 PATCH: 21 PATCH, EXTENDED RELEASE TRANSDERMAL at 22:27

## 2019-07-04 RX ADMIN — THIAMINE HYDROCHLORIDE 100 MG: 100 INJECTION, SOLUTION INTRAMUSCULAR; INTRAVENOUS at 18:51

## 2019-07-04 RX ADMIN — POTASSIUM CHLORIDE 40 MEQ: 1500 TABLET, EXTENDED RELEASE ORAL at 22:44

## 2019-07-04 RX ADMIN — OCTREOTIDE ACETATE 50 MCG/HR: 200 INJECTION, SOLUTION INTRAVENOUS; SUBCUTANEOUS at 18:57

## 2019-07-04 RX ADMIN — SODIUM CHLORIDE 1000 ML: 9 INJECTION, SOLUTION INTRAVENOUS at 18:39

## 2019-07-04 RX ADMIN — PANTOPRAZOLE SODIUM 40 MG: 40 INJECTION, POWDER, FOR SOLUTION INTRAVENOUS at 18:39

## 2019-07-04 RX ADMIN — SODIUM CHLORIDE: 9 INJECTION, SOLUTION INTRAVENOUS at 22:27

## 2019-07-04 ASSESSMENT — MIFFLIN-ST. JEOR: SCORE: 1320.17

## 2019-07-04 NOTE — ED PROVIDER NOTES
"  History     Chief Complaint   Patient presents with     Rectal Bleeding     HPI  Prasanna Alonso is a 64 year old male with a history of severe alcohol dependence complicated by alcoholic peripheral neuropathy who presents with 24 to 48 hours of black tarry stools.  He states that yesterday he had 1-2 small episodes of black tarry stools.  Then today he woke up and had for \"explosions\" of thin tarry jet black stools.  He states he has never had anything like this before.  It is associated with nausea but no vomiting.  He has not been able to eat any food but has been able to drink water.  His last alcoholic beverage was yesterday evening.  He states that a liter of hard liquor lasts him about 1 week now.  He has not been to see his primary care provider in about 1 year.  He states prior to yesterday he was feeling in his normal state of health.  He denies any fevers, chills, headache, sore throat, congestion, rhinorrhea, chest pain, shortness of breath, abdominal pain, dysuria, hematuria, and rash.    Allergies:  No Known Allergies    Problem List:    Patient Active Problem List    Diagnosis Date Noted     Alcoholic peripheral neuropathy (H) 10/20/2015     Priority: Medium     Tobacco use disorder 09/17/2012     Priority: Medium     Degenerative arthritis of hip 08/27/2012     Priority: Medium     Backache 02/28/2012     Priority: Medium     Alcohol abuse 06/13/2011     Priority: Medium     Overview:   chemical dependency       Osteoarthrosis, hand 06/13/2011     Priority: Medium        Past Medical History:    Past Medical History:   Diagnosis Date     Convulsions (H)      Osteoarthritis      Other psychoactive substance dependence, uncomplicated (H)      Superficial frostbite        Past Surgical History:    Past Surgical History:   Procedure Laterality Date     OTHER SURGICAL HISTORY      2011,600000,OTHER       Family History:    History reviewed. No pertinent family history.    Social History:  Marital " "Status:  Single [1]  Social History     Tobacco Use     Smoking status: Current Every Day Smoker     Packs/day: 1.00     Years: 40.00     Pack years: 40.00     Types: Cigarettes     Smokeless tobacco: Never Used   Substance Use Topics     Alcohol use: Yes     Alcohol/week: 3.0 - 3.6 oz     Comment: Alcoholic Drinks/day: 1 liter per week     Drug use: Unknown     Types: Other     Comment: Drug use: No        Medications:      gabapentin (NEURONTIN) 300 MG capsule   acetaminophen (TYLENOL) 325 MG tablet   ibuprofen (ADVIL/MOTRIN) 200 MG tablet     A complete 10 point review of systems was completed and negative except as noted above in HPI.    Physical Exam   BP: 118/69  Pulse: 71  Temp: 98.7  F (37.1  C)  Resp: 12  Height: 162.6 cm (5' 4\")  Weight: 61.4 kg (135 lb 6.4 oz)  SpO2: 94 %  Constitutional: Patient laying comfortably in bed in no apparent distress.  Telangiectasias present on the nose and cheeks.  Neurological: Face symmetric, speech clear, gait normal, follows commands, moving all extremities.  Eyes: EOMI, sclera clear  ENT: Oropharynx without erythema or purulence, no tonsilar exudates, Mucus membranes moist.  Cardiovascular: Regular rate, regular rhythm, no murmurs, no rubs, no gallops. Extremities warm, well perfused.  Respiratory: lungs clear to auscultation bilaterally over anterior  lung fields. No wheezes, rhonchi, or rales.  Gastrointestinal: Abdomen is soft, nontender, nondistended, bowel sounds present, no rebound or guarding.   Rectal exam: No external or internal hemorrhoids appreciated.  There is star melanotic stool in the rectal vault.  Musculoskeletal: Spontaneously moving all extremities, no visible bony defects.   Integumentary: Skin is warm and well perfused.  Psychiatric: Appropriate. Mood and affect congruent.      ED Course   Patient triaged, placed in room, vitals obtained and patient was rapidly seen and evaluated by me.  2 large-bore IVs were placed, labs were sent off, and " patient was treated with IV Protonix 40 mg, and an order was placed for continuous infusion of IV octreotide.    Procedures      Critical Care time:  was 30 minutes for this patient excluding procedures.         No results found for this or any previous visit (from the past 24 hour(s)).    Medications   octreotide (sandoSTATIN) 1,250 mcg in sodium chloride 0.9 % 250 mL (has no administration in time range)   0.9% sodium chloride BOLUS (1,000 mLs Intravenous New Bag 7/4/19 1839)   thiamine (B-1) injection 100 mg (has no administration in time range)   folic acid injection 1 mg (has no administration in time range)   pantoprazole (PROTONIX) 40 mg IV push injection (40 mg Intravenous Given 7/4/19 1839)       Assessments & Plan (with Medical Decision Making)   Prasanna is a 64-year-old gentleman with a history of severe alcohol abuse who presents with a first episode of 24 hours of significant melanotic stool.  Reassuringly he has vitally stable on exam on presentation to the emergency department.  However given high risk for alcoholic bleeds including esophageal varices, he was started on a drip of IV octreotide and given a dose of IV Protonix.  Initial lab work included CBC, INR, CMP In addition to type and screen.  On reevaluation, hemoglobin returned at 10.8 and his INR and CMP returned quite reassuring.  Overall, this is concerning for an acute upper GI bleed which is his initial presentation of this pathology.  Given this, I discussed his case with Dr. Cortes with general surgery who agreed that a good idea for observation, serial hemoglobins and AM re-evaluation for possible upper GI scope. He was therefore signed out to Dr. Trejo with the hospitalist group and admitted to observation. He was still in the ED at the end of my shift and was signed out to Dr. Allyson Montes De Oca. If no further issues arise, please refer to the admitting team's documentation for remainder of observation course.    I have reviewed  the nursing notes.    I have reviewed the findings, diagnosis, plan and need for follow up with the patient.     Medication List      There are no discharge medications for this visit.         Final diagnoses:   UGIB (upper gastrointestinal bleed)   Gastrointestinal hemorrhage with melena       7/4/2019   [facility]  07/04/19 0612

## 2019-07-04 NOTE — ED TRIAGE NOTES
"ED Nursing Triage Note (General)   ________________________________    Prasanna Alonso is a 64 year old Male that presents to triage private car  With history of  Pt has had 4 large liquid black stools today, states he does have some nausea but no vomiting, unable to eat or drink, reported by patient   Significant symptoms had onset 24 hour(s) ago.  /69   Pulse 71   Temp 98.7  F (37.1  C) (Tympanic)   Resp 12   Ht 1.626 m (5' 4\")   Wt 61.4 kg (135 lb 6.4 oz)   SpO2 94%   BMI 23.24 kg/m  t  Patient appears slow to respond, in mild distress., and calm behavior.    GCS:  14  Airway: intact  Breathing noted as Normal.  Circulation  Pale with various bruising on arms  Skin cool and bruised  Action taken:  Triage to critical care immediately      PRE HOSPITAL PRIOR LIVING SITUATION Alone  "

## 2019-07-05 ENCOUNTER — HOSPITAL ENCOUNTER (OUTPATIENT)
Facility: OTHER | Age: 64
End: 2019-07-05
Attending: SURGERY | Admitting: SURGERY
Payer: MEDICARE

## 2019-07-05 DIAGNOSIS — K92.2 GASTROINTESTINAL HEMORRHAGE, UNSPECIFIED GASTROINTESTINAL HEMORRHAGE TYPE: Primary | ICD-10-CM

## 2019-07-05 LAB
ALBUMIN SERPL-MCNC: 3.4 G/DL (ref 3.5–5.7)
ALP SERPL-CCNC: 33 U/L (ref 34–104)
ALT SERPL W P-5'-P-CCNC: 13 U/L (ref 7–52)
ANION GAP SERPL CALCULATED.3IONS-SCNC: 20 MMOL/L (ref 3–14)
AST SERPL W P-5'-P-CCNC: 23 U/L (ref 13–39)
BILIRUB SERPL-MCNC: 0.7 MG/DL (ref 0.3–1)
BUN SERPL-MCNC: 7 MG/DL (ref 7–25)
CALCIUM SERPL-MCNC: 8.4 MG/DL (ref 8.6–10.3)
CHLORIDE SERPL-SCNC: 100 MMOL/L (ref 98–107)
CO2 SERPL-SCNC: 15 MMOL/L (ref 21–31)
CREAT SERPL-MCNC: 0.56 MG/DL (ref 0.7–1.3)
ERYTHROCYTE [DISTWIDTH] IN BLOOD BY AUTOMATED COUNT: 13.6 % (ref 10–15)
GFR SERPL CREATININE-BSD FRML MDRD: >90 ML/MIN/{1.73_M2}
GLUCOSE SERPL-MCNC: 81 MG/DL (ref 70–105)
HCT VFR BLD AUTO: 32.1 % (ref 40–53)
HGB BLD-MCNC: 10.8 G/DL (ref 13.3–17.7)
HGB BLD-MCNC: 9.8 G/DL (ref 13.3–17.7)
MAGNESIUM SERPL-MCNC: 1.4 MG/DL (ref 1.9–2.7)
MAGNESIUM SERPL-MCNC: 2.4 MG/DL (ref 1.9–2.7)
MCH RBC QN AUTO: 33.5 PG (ref 26.5–33)
MCHC RBC AUTO-ENTMCNC: 33.6 G/DL (ref 31.5–36.5)
MCV RBC AUTO: 100 FL (ref 78–100)
PHOSPHATE SERPL-MCNC: 1.5 MG/DL (ref 2.5–5)
PLATELET # BLD AUTO: 207 10E9/L (ref 150–450)
POTASSIUM SERPL-SCNC: 4.1 MMOL/L (ref 3.5–5.1)
PROT SERPL-MCNC: 5.6 G/DL (ref 6.4–8.9)
RBC # BLD AUTO: 3.22 10E12/L (ref 4.4–5.9)
SODIUM SERPL-SCNC: 135 MMOL/L (ref 134–144)
WBC # BLD AUTO: 5.4 10E9/L (ref 4–11)

## 2019-07-05 PROCEDURE — 25000128 H RX IP 250 OP 636: Performed by: INTERNAL MEDICINE

## 2019-07-05 PROCEDURE — 99223 1ST HOSP IP/OBS HIGH 75: CPT | Mod: 25 | Performed by: SURGERY

## 2019-07-05 PROCEDURE — 87338 HPYLORI STOOL AG IA: CPT | Performed by: INTERNAL MEDICINE

## 2019-07-05 PROCEDURE — 83735 ASSAY OF MAGNESIUM: CPT | Performed by: INTERNAL MEDICINE

## 2019-07-05 PROCEDURE — 25800030 ZZH RX IP 258 OP 636: Performed by: FAMILY MEDICINE

## 2019-07-05 PROCEDURE — 36415 COLL VENOUS BLD VENIPUNCTURE: CPT | Performed by: INTERNAL MEDICINE

## 2019-07-05 PROCEDURE — 85027 COMPLETE CBC AUTOMATED: CPT | Performed by: INTERNAL MEDICINE

## 2019-07-05 PROCEDURE — 84100 ASSAY OF PHOSPHORUS: CPT | Performed by: FAMILY MEDICINE

## 2019-07-05 PROCEDURE — 25000131 ZZH RX MED GY IP 250 OP 636 PS 637: Performed by: INTERNAL MEDICINE

## 2019-07-05 PROCEDURE — 99233 SBSQ HOSP IP/OBS HIGH 50: CPT | Performed by: FAMILY MEDICINE

## 2019-07-05 PROCEDURE — 36415 COLL VENOUS BLD VENIPUNCTURE: CPT | Performed by: FAMILY MEDICINE

## 2019-07-05 PROCEDURE — 12000000 ZZH R&B MED SURG/OB

## 2019-07-05 PROCEDURE — 80053 COMPREHEN METABOLIC PANEL: CPT | Performed by: INTERNAL MEDICINE

## 2019-07-05 PROCEDURE — 25800030 ZZH RX IP 258 OP 636: Performed by: INTERNAL MEDICINE

## 2019-07-05 PROCEDURE — C9113 INJ PANTOPRAZOLE SODIUM, VIA: HCPCS | Performed by: INTERNAL MEDICINE

## 2019-07-05 PROCEDURE — 25000125 ZZHC RX 250: Mod: JW | Performed by: FAMILY MEDICINE

## 2019-07-05 PROCEDURE — 25000132 ZZH RX MED GY IP 250 OP 250 PS 637: Performed by: FAMILY MEDICINE

## 2019-07-05 PROCEDURE — 85018 HEMOGLOBIN: CPT | Performed by: INTERNAL MEDICINE

## 2019-07-05 RX ORDER — LANOLIN ALCOHOL/MO/W.PET/CERES
100 CREAM (GRAM) TOPICAL DAILY
Status: DISCONTINUED | OUTPATIENT
Start: 2019-07-05 | End: 2019-07-08 | Stop reason: HOSPADM

## 2019-07-05 RX ORDER — MAGNESIUM SULFATE HEPTAHYDRATE 40 MG/ML
2 INJECTION, SOLUTION INTRAVENOUS DAILY PRN
Status: DISCONTINUED | OUTPATIENT
Start: 2019-07-05 | End: 2019-07-08 | Stop reason: HOSPADM

## 2019-07-05 RX ORDER — PANTOPRAZOLE SODIUM 40 MG/1
40 TABLET, DELAYED RELEASE ORAL
Qty: 60 TABLET | Refills: 0 | Status: SHIPPED | OUTPATIENT
Start: 2019-07-05 | End: 2020-06-09

## 2019-07-05 RX ORDER — POTASSIUM CHLORIDE 1500 MG/1
20-40 TABLET, EXTENDED RELEASE ORAL
Status: DISCONTINUED | OUTPATIENT
Start: 2019-07-05 | End: 2019-07-08 | Stop reason: HOSPADM

## 2019-07-05 RX ORDER — MULTIPLE VITAMINS W/ MINERALS TAB 9MG-400MCG
1 TAB ORAL DAILY
Status: DISCONTINUED | OUTPATIENT
Start: 2019-07-05 | End: 2019-07-08 | Stop reason: HOSPADM

## 2019-07-05 RX ORDER — MULTIPLE VITAMINS W/ MINERALS TAB 9MG-400MCG
1 TAB ORAL DAILY
Qty: 100 TABLET | Refills: 0 | Status: SHIPPED | OUTPATIENT
Start: 2019-07-05

## 2019-07-05 RX ORDER — POTASSIUM CHLORIDE 7.45 MG/ML
10 INJECTION INTRAVENOUS
Status: DISCONTINUED | OUTPATIENT
Start: 2019-07-05 | End: 2019-07-08 | Stop reason: HOSPADM

## 2019-07-05 RX ORDER — LORAZEPAM 2 MG/ML
1-2 INJECTION INTRAMUSCULAR EVERY 30 MIN PRN
Status: DISCONTINUED | OUTPATIENT
Start: 2019-07-05 | End: 2019-07-08 | Stop reason: HOSPADM

## 2019-07-05 RX ORDER — POLYETHYLENE GLYCOL 3350, SODIUM CHLORIDE, SODIUM BICARBONATE, POTASSIUM CHLORIDE 420; 11.2; 5.72; 1.48 G/4L; G/4L; G/4L; G/4L
4000 POWDER, FOR SOLUTION ORAL ONCE
Qty: 4000 ML | Refills: 0 | Status: SHIPPED | OUTPATIENT
Start: 2019-07-05 | End: 2019-07-08

## 2019-07-05 RX ORDER — MAGNESIUM SULFATE HEPTAHYDRATE 40 MG/ML
4 INJECTION, SOLUTION INTRAVENOUS EVERY 4 HOURS PRN
Status: DISCONTINUED | OUTPATIENT
Start: 2019-07-05 | End: 2019-07-08 | Stop reason: HOSPADM

## 2019-07-05 RX ORDER — LORAZEPAM 1 MG/1
1-2 TABLET ORAL EVERY 30 MIN PRN
Status: DISCONTINUED | OUTPATIENT
Start: 2019-07-05 | End: 2019-07-08 | Stop reason: HOSPADM

## 2019-07-05 RX ORDER — FOLIC ACID 5 MG/ML
1 INJECTION, SOLUTION INTRAMUSCULAR; INTRAVENOUS; SUBCUTANEOUS ONCE
Status: COMPLETED | OUTPATIENT
Start: 2019-07-05 | End: 2019-07-05

## 2019-07-05 RX ORDER — FOLIC ACID 1 MG/1
1 TABLET ORAL DAILY
Status: DISCONTINUED | OUTPATIENT
Start: 2019-07-05 | End: 2019-07-05 | Stop reason: RX

## 2019-07-05 RX ORDER — LOPERAMIDE HCL 2 MG
4 CAPSULE ORAL DAILY PRN
Status: ON HOLD | COMMUNITY
End: 2019-07-08

## 2019-07-05 RX ORDER — ASPIRIN 81 MG
200 TABLET, DELAYED RELEASE (ENTERIC COATED) ORAL ONCE
Qty: 2 TABLET | Refills: 0 | Status: SHIPPED | OUTPATIENT
Start: 2019-07-05 | End: 2019-07-08

## 2019-07-05 RX ADMIN — SODIUM CHLORIDE: 9 INJECTION, SOLUTION INTRAVENOUS at 23:14

## 2019-07-05 RX ADMIN — THIAMINE HCL TAB 100 MG 100 MG: 100 TAB at 12:00

## 2019-07-05 RX ADMIN — FOLIC ACID 1 MG: 1 TABLET ORAL at 12:00

## 2019-07-05 RX ADMIN — ONDANSETRON 4 MG: 4 TABLET, ORALLY DISINTEGRATING ORAL at 03:30

## 2019-07-05 RX ADMIN — SODIUM PHOSPHATE, MONOBASIC, MONOHYDRATE 20 MMOL: 276; 142 INJECTION, SOLUTION INTRAVENOUS at 16:57

## 2019-07-05 RX ADMIN — MULTIPLE VITAMINS W/ MINERALS TAB 1 TABLET: TAB at 12:00

## 2019-07-05 RX ADMIN — FOLIC ACID 1 MG: 5 INJECTION, SOLUTION INTRAMUSCULAR; INTRAVENOUS; SUBCUTANEOUS at 16:56

## 2019-07-05 RX ADMIN — MAGNESIUM SULFATE HEPTAHYDRATE 4 G: 40 INJECTION, SOLUTION INTRAVENOUS at 06:58

## 2019-07-05 RX ADMIN — POTASSIUM CHLORIDE 40 MEQ: 1500 TABLET, EXTENDED RELEASE ORAL at 01:02

## 2019-07-05 RX ADMIN — PANTOPRAZOLE SODIUM 40 MG: 40 INJECTION, POWDER, FOR SOLUTION INTRAVENOUS at 16:56

## 2019-07-05 RX ADMIN — PANTOPRAZOLE SODIUM 40 MG: 40 INJECTION, POWDER, FOR SOLUTION INTRAVENOUS at 07:45

## 2019-07-05 RX ADMIN — SODIUM CHLORIDE: 9 INJECTION, SOLUTION INTRAVENOUS at 09:32

## 2019-07-05 ASSESSMENT — ACTIVITIES OF DAILY LIVING (ADL)
ADLS_ACUITY_SCORE: 13
ADLS_ACUITY_SCORE: 12

## 2019-07-05 NOTE — PHARMACY-ADMISSION MEDICATION HISTORY
"Pharmacy -- Admission Medication Reconciliation    Prior to admission (PTA) medications were reviewed and the patient's PTA medication list was updated.    Sources Consulted: Patient, , Norman Huitron    The reliability of this Medication Reconciliation is: Reliability: Borderline reliable  Patient with noted alcohol withdrawal - appears he is able to discuss medications at this point (consulted with RN) - at discharge patient's list could be readdressed to ensure accuracy of interview.     The following significant changes were made:  Removed acetaminophen - patient states he does not take this.   Ibuprofen already removed by MD to stop at discharge (patient did state he takes this occasionally - but has been over a month sense he has taken this)  Added imodium (patient states he took one dose before coming into the ER)  Added eye drops PRN (patient states he takes this occasionally when the pollen is out - he thought the drops were called \"Clear Eyes\")    In addition, the patient's allergies were reviewed with the patient and updated as follows:   Allergies: Patient has no known allergies.    The pharmacist has reviewed with the patient that all personal medications should be removed from the building or locked in the belongings safe.  Patient shall only take medications ordered by the physician and administered by the nursing staff.     Medication barriers identified: none noted.    Medication adherence concerns: none noted.    Understanding of emergency medications: RAY Salinas RPH, 7/5/2019,  1:23 PM     "

## 2019-07-05 NOTE — DISCHARGE INSTRUCTIONS
Avoid alcohol  Avoid aspirin and ibuprofen products (motrin, aleve, etc) until follow up with surgery  If any recurrent bloody or dark, tarry stool, return to ER  Start new medication protonix,  Twice daily until follow up

## 2019-07-05 NOTE — PLAN OF CARE
"/66   Pulse 100   Temp 96.7  F (35.9  C) (Tympanic)   Resp 16   Ht 1.626 m (5' 4\")   Wt 61.4 kg (135 lb 6.4 oz)   SpO2 97%   BMI 23.24 kg/m      PT VS.  Pt complains of stomach upset located in lower abdomen.  Calls to go into bathroom when stomach upset occurs, prn Zofran administered declines any pain medication.  Stool sample sent to lab, awaiting results.  Will continue to monitor.  Susana Hand RN.............................7/5/2019 4:56 AM     "

## 2019-07-05 NOTE — CONSULTS
GENERAL SURGERY CONSULTATION NOTE    Prasanna Alonso   [address]    [facility] [id number]  64 year old  male  Admission Date/Time: 7/4/2019  6:00 PM  Primary Care Provider:  No Ref-Primary, Physician    I was asked to see this patient by Dr Trejo / Mami  for evaluation of GI bleeding.     HPI: Prasanna Alonso is a 64 year old male with significant EtOH use who presented with melena and anemia to the ER. Pt was observed overnight and HGB are stable. Pt has not had previous endoscopy.     REVIEW OF SYSTEMS:    GENERAL: No fevers or chills. Denies fatigue, recent weight loss.  HEENT: No sinus drainage. No changes with vision or hearing. No difficulty swallowing.   LYMPHATICS:  No swollen nodes in axilla, neck or groin.  CARDIOVASCULAR: Denies chest pain, palpitations and dyspnea on exertion.  PULMONARY: No shortness of breath or cough. No increase in sputum production.  GI: See HPI. No hematemesis. No constipation or diarrhea.  : No dysuria or hematuria.  SKIN: No recent rashes or ulcers.   HEMATOLOGY:  No history of easy bruising or bleeding.  ENDOCRINE:  No history of diabetes or thyroid problems.  NEUROLOGY:  No history of seizures or headaches. No motor or sensory changes.        Patient Active Problem List   Diagnosis     Alcohol abuse     Alcoholic peripheral neuropathy (H)     Backache     Degenerative arthritis of hip     Osteoarthrosis, hand     Tobacco use disorder     GI bleed       Past Medical History:   Diagnosis Date     Convulsions (H)     2010,etoh WD     Osteoarthritis     hands, back, hips     Other psychoactive substance dependence, uncomplicated (H)     etoh, tob     Superficial frostbite     30 yr ago       Past Surgical History:   Procedure Laterality Date     OTHER SURGICAL HISTORY      2011,600000,OTHER       Family History   Problem Relation Age of Onset     No Known Problems Other         No history of GI bleeding       Social History     Social History Narrative     Unemployed.  Lives with his mother here in [place].  Has brother and [place] and 5 sisters in the [facility].  Smoker.  Prior heavy drinker and went through treatment.  Now drinking only about a single 1.75 L bottle of vodka a week.       Social History     Socioeconomic History     Marital status: Single     Spouse name: Not on file     Number of children: Not on file     Years of education: Not on file     Highest education level: Not on file   Occupational History     Not on file   Social Needs     Financial resource strain: Not on file     Food insecurity:     Worry: Not on file     Inability: Not on file     Transportation needs:     Medical: Not on file     Non-medical: Not on file   Tobacco Use     Smoking status: Current Every Day Smoker     Packs/day: 1.00     Years: 40.00     Pack years: 40.00     Types: Cigarettes     Smokeless tobacco: Never Used   Substance and Sexual Activity     Alcohol use: Yes     Alcohol/week: 3.0 - 3.6 oz     Comment: Alcoholic Drinks/day: 1 liter per week     Drug use: Unknown     Types: Other     Comment: Drug use: No     Sexual activity: Not on file   Lifestyle     Physical activity:     Days per week: Not on file     Minutes per session: Not on file     Stress: Not on file   Relationships     Social connections:     Talks on phone: Not on file     Gets together: Not on file     Attends Zoroastrianism service: Not on file     Active member of club or organization: Not on file     Attends meetings of clubs or organizations: Not on file     Relationship status: Not on file     Intimate partner violence:     Fear of current or ex partner: Not on file     Emotionally abused: Not on file     Physically abused: Not on file     Forced sexual activity: Not on file   Other Topics Concern     Not on file   Social History Narrative    Unemployed.  Lives with his mother here in [place].  Has brother and [place] and 5 sisters in the [facility].  Smoker.  Prior heavy drinker  "and went through treatment.  Now drinking only about a single 1.75 L bottle of vodka a week.         No current facility-administered medications on file prior to encounter.   Current Outpatient Medications on File Prior to Encounter:  gabapentin (NEURONTIN) 300 MG capsule Take 1 capsule (300 mg) by mouth 4 times daily   acetaminophen (TYLENOL) 325 MG tablet Take 2 tablets by mouth every 4 hours as needed Max acetaminophen dose: 4000mg in 24 hrs         ALLERGIES/SENSITIVITIES: No Known Allergies    PHYSICAL EXAM:     /68   Pulse 102   Temp 97.8  F (36.6  C) (Tympanic)   Resp 18   Ht 1.626 m (5' 4\")   Wt 61.4 kg (135 lb 6.4 oz)   SpO2 98%   BMI 23.24 kg/m      General Appearance:   Appears well  Heart & CV:  RRR no murmur.  Intact distal pulses, good cap refill.  LUNGS:  CTA B/L, no wheezing or crackles.  Abd:  Non-distended, mild epigastric tenderness.   Ext: No deformity.       ADDITIONAL COMMENTS:      CONSULTATION ASSESSMENT AND PLAN:    64 year old male with GI bleeding, but not outright hemorrhage. No change in HGB despite fluids.    -  Upper and lower scopes as OP in 1-2 weeks.  - Avoid EtOH and NSAIDs  - PPI until endoscopy  - Follow-up H Pylori     Adalberto Cortes MD on 7/5/2019 at 8:39 AM      "

## 2019-07-05 NOTE — H&P
[facility]    History and Physical  Hospitalist       Date of Admission:  7/4/2019    Assessment & Plan   Prasanna Alonso is a 64 year old male who presents with probable indolent upper GI bleed.    Principal Problem:    GI bleed    Assessment: Hemodynamically stable, no further melena in the last 12 hours, globin decreased from 14.8 a year ago to 10.8 today, differential includes gastritis versus peptic ulcer disease and others.  Given his stability variceal bleeding very unlikely.  No nsaid use.    Plan: - serial hemoglobin   - n.p.o.   - IV PPI   - check H pylori   - appreciate general surgery consult for consideration for endoscopy   -Start octreotide only if having significant worsening bleeding given no history or evidence of variceal bleeding    Active Problems:    Alcohol abuse    Assessment: Ongoing heavy use, no drinks in the last 48 hours, no history of severe alcohol withdrawal per patient report    Plan: -Monitor and if developing signs/symptoms of alcohol withdrawal we will start CIWA protocol      Tobacco use disorder    Assessment: Ongoing smoker counseled to quit    Plan: -Nicotine patch as needed    FEN: npo diet, normal saline at 125 mL/hr, mg/k replacement protocol  PPX: PROTON PUMP INHIBITOR, SCD's     Code Status: No Order    Doyle Trejo    Primary Care Physician   Physician No Ref-Primary    Chief Complaint   melena    History is obtained from the patient and chart review.    History of Present Illness   Prasanna Alonso is a 64 year old male who presents with nonionic stools.  Patient has been his normal state health, drinks only a singular 1.75 L bottle of vodka per week and is a smoker.  No significant NSAID use GERD or abdominal pain and about 4 days ago developed loose dark-colored stools.  Been increasing in frequency until yesterday and he had a large volume melanotic stool.  He took one Imodium and has had no further stools since last night.  He presented to  the emergency department this evening given his ongoing concerns, hemoglobin was noted to be lower than prior, he was started on IV PPI, octreotide drip and subsequently admitted for further management.    Past Medical History    I have reviewed this patient's medical history and updated it with pertinent information if needed.   Past Medical History:   Diagnosis Date     Convulsions (H)     2010,etoh WD     Osteoarthritis     hands, back, hips     Other psychoactive substance dependence, uncomplicated (H)     etoh, tob     Superficial frostbite     30 yr ago       Past Surgical History   I have reviewed this patient's surgical history and updated it with pertinent information if needed.  Past Surgical History:   Procedure Laterality Date     OTHER SURGICAL HISTORY      2011,600000,OTHER       Prior to Admission Medications   Prior to Admission Medications   Prescriptions Last Dose Informant Patient Reported? Taking?   acetaminophen (TYLENOL) 325 MG tablet Unknown at Unknown time  Yes No   Sig: Take 2 tablets by mouth every 4 hours as needed Max acetaminophen dose: 4000mg in 24 hrs   gabapentin (NEURONTIN) 300 MG capsule 7/4/2019 at Unknown time  No Yes   Sig: Take 1 capsule (300 mg) by mouth 4 times daily   ibuprofen (ADVIL/MOTRIN) 200 MG tablet Unknown at Unknown time  Yes No   Sig: Take 400 mg by mouth every 6 hours      Facility-Administered Medications: None     Allergies   No Known Allergies    Social History   I have reviewed this patient's social history and updated it with pertinent information if needed. Prasanna BOLAND Gavin  reports that he has been smoking cigarettes.  He has a 40.00 pack-year smoking history. He has never used smokeless tobacco. He reports that he drinks about 3.0 - 3.6 oz of alcohol per week.    Family History   I have reviewed this patient's family history and updated it with pertinent information if needed.   Family History   Problem Relation Age of Onset     No Known Problems Other          No history of GI bleeding       Review of Systems     REVIEW OF SYSTEMS:    Constitutional: normal energy and appetite, no recent sick contacts, no fevers.  Eyes: no changes in vision  Ears, nose, mouth, throat, and face: no mouth sores, dysphagia, or odynophagia  Respiratory: no shortness of breath, cough, or wheezing. No aspiration symptoms.   Cardiovascular: no chest pain, palpitations, orthopnea, increased lower extremity edema, or syncope.   Gastrointestinal: Nauseated but no vomiting and no abdominal pain.  Genitourinary: no dysuria, hematuria, urgency or frequency.   Hematologic/lymphatic: no unintentional weight loss or night sweats.  Musculoskeletal: Fell last week due to his neuropathy and hurt his left forearm, bruise is nearly resolved.  Neurological: no new weakness, tingling, numbness.   Endocrine: not a known diabetic.     Physical Exam   Temp: 98.7  F (37.1  C) Temp src: Tympanic BP: 113/87 Pulse: 113 Heart Rate: 112 Resp: 15 SpO2: 99 % O2 Device: None (Room air)    Vital Signs with Ranges  Temp:  [98.7  F (37.1  C)] 98.7  F (37.1  C)  Pulse:  [] 113  Heart Rate:  [101-113] 112  Resp:  [12-29] 15  BP: (113-123)/(69-99) 113/87  SpO2:  [94 %-99 %] 99 %  135 lbs 6.4 oz    Exam:  GENERAL: Talkative, in no apparent distress.  Head: normocephalic and atraumatic  Eyes: anicteric and non-injected sclera  Nose: no rhinorrhea or epistaxis.   Throat: moist mucous membranes with no active oral lesions.  NECK: Supple, jugular venous distension not present.  CARDIOVASCULAR: regular rate and rhythm, no murmurs, rubs, or gallops. Normal S1/S2. No lower extremity edema.   RESPIRATORY: clear to auscultation bilaterally, no wheezes, no crackles.  No accessory muscle use or evidence of respiratory distress.   GI: soft, non-tender, non-distended, normoactive bowel sounds.  MUSCULOSKELETAL: warm and well perfused, 2+ dorsalis pedis pulses.  Multiple hypertrophic appearing joints of his bilateral DIPs and PIPs  of his hands.  SKIN: Telangiectasias of his upper chest and maxilla.  NEUROLOGY: AAOx3, follows commands, speech and language without focal deficits.  No upper extremity tremor.    Data   Data reviewed today:  I personally reviewed no images or EKG's today.  Recent Labs   Lab 07/04/19  1835   WBC 4.9   HGB 10.8*   MCV 98      INR 1.02   *   POTASSIUM 2.7*   CHLORIDE 90*   CO2 17*   BUN 11   CR 0.69*   ANIONGAP 24*   LANI 9.3   *   ALBUMIN 3.8   PROTTOTAL 6.4   BILITOTAL 0.9   ALKPHOS 34   ALT 13   AST 17       No results found for this or any previous visit (from the past 24 hour(s)).

## 2019-07-05 NOTE — PROGRESS NOTES
"Prasanna Alonso 64 year old male   Admission date:7/4/2019   Code status: Full Code   Isolation:No active isolations   Principal Problem:GI bleed   Diet: regular  Discharge timeline & plan: progressing to discharge. No additional resources anticipated.   If patient has no withdrawal symptoms possible discharge tomorrow, if starts withdrawal then will stay and have scopes done on Monday either outpatient or inpatient.     Vital signs:  Temp: 97.8  F (36.6  C) Temp src: Tympanic BP: 102/68 Pulse: 102 Heart Rate: 82 Resp: 18 SpO2: 98 % O2 Device: None (Room air)   Height: 162.6 cm (5' 4\") Weight: 61.4 kg (135 lb 6.4 oz)  Estimated body mass index is 23.24 kg/m  as calculated from the following:    Height as of this encounter: 1.626 m (5' 4\").    Weight as of this encounter: 61.4 kg (135 lb 6.4 oz).  Abnormal Physical Assessment:  Soft, slightly distended abdomen, active bowel sounds.  No blood/tarry stools this shift.  Last Pain/PRN Medication given: none given on AM shift  Abnormal labs; Finger stick POCT blood sugars,:   Your hemoglobin result  Lab Results   Component Value Date    HGB 10.8 07/05/2019       Telemetry: Yes - tachycardic  Pending tests/procedures planned:  Repeated hemoglobin, Ciwa      SAFETY CHECKLIST  Arm Bands/signs/magnets (code status, fall risk, allergy, limb, etc.) in place: YES, fall  IVF/Medications/rate ordered infusing (do they match the order?):YES  Physical assessment (wounds, incisions, dopplers, LDA's, neuro, CIWA, etc.)YES   Ciwas per order  Environmental assessment (bed/chair alarm on, call light, side rails, restraints, etc.): YES   Whiteboard updated: YES          "

## 2019-07-05 NOTE — PLAN OF CARE
Pt A & O x 4, afebrile, vital signs stable. Slight tremor present, denies nausea, CIWA score 1. Lungs clear, bowel sounds active, x 1 loose brown stool this shift Pt denies any pain, will continue to monitor. Mitzi Lucero RN on 7/5/2019 at 5:27 PM

## 2019-07-05 NOTE — PROGRESS NOTES
[facility]  Hospitalist Progress Note      Assessment & Plan   Prasanna Alonso is a 64 year old male who was admitted on 7/4/2019 for several days of dark stool, now resolved.      GI bleed  -Admit inpatient  -Continue Protonix twice daily  -Avoid aspirin and NSAIDs.  -Encouraged complete cessation from alcohol.  -Advised to cut down on smoking.  -Serial hemoglobin.  Transfuse if hemoglobin less than 7.  -Diet as tolerated.      Alcohol abuse with alcohol withdrawal  -Put him on a CIWA scale and Ativan as needed  -monitor and replace electrolytes  -Continue IV fluids overnight.  Continue oral vitamin with folic acid and thiamine.  -Monitor on telemetry.      Tobacco use disorder, smokes about a half a pack per day  -nicotine patch      DVT Prophylaxis: Contraindicated due to acute GI bleeding.  Code Status: Full Code    Rabia Bolaños    Interval History   Patient was seen by surgery this morning.  He has not had any further episodes of bleeding.  However, per discussion with nursing staff.  He is starting to show some signs symptoms of withdrawal.  Plan was to discharge and go home with outpatient endoscopy and colonoscopy.  However, given that he is started to acutely withdraw and has been advised to avoid alcohol I think he will require further hospitalization and inpatient endoscopy and colonoscopy.  Hemoglobin was stable this morning he denies chest pain, lightheadedness, dizziness, shortness of breath, chest pain or any other concerns.  He has not yet eating his breakfast.  Tells me he is generally not been eating a very good diet for at least the last week.  Denies nausea or vomiting this morning.    -Data reviewed today: I reviewed all new labs and imaging results over the last 24 hours. I personally reviewed no images or EKG's today.    Physical Exam   Temp: 97.8  F (36.6  C) Temp src: Tympanic BP: 102/68 Pulse: 102 Heart Rate: 82 Resp: 18 SpO2: 98 % O2 Device: None (Room air)     Vitals:    07/04/19 1756   Weight: 61.4 kg (135 lb 6.4 oz)     Vital Signs with Ranges  Temp:  [96.7  F (35.9  C)-98.7  F (37.1  C)] 97.8  F (36.6  C)  Pulse:  [] 102  Heart Rate:  [] 82  Resp:  [12-29] 18  BP: (102-129)/(66-99) 102/68  SpO2:  [94 %-100 %] 98 %  I/O last 3 completed shifts:  In: 5 [I.V.:5]  Out: 350 [Urine:350]    Constitutional: Awake and alert.  On my exam did not have any acute signs of withdrawal however throughout the morning did develop signs of withdrawal per discussion with nursing staff.  Malnourished and underweight.  Respiratory: Clear to auscultation bilaterally.  No wheezing, rhonchi, rales.  Cardiovascular: Regular rate.  No murmur.  GI: Abdomen soft, nontender, nondistended.  There is no ascites present.  No fluid wave.  No organomegaly.  Skin/Integumen: Warm, dry, intact.  Telangiectasias of the nose and chest area.  Other:      Medications     sodium chloride 100 mL/hr at 07/05/19 0932       folic acid  1 mg Oral Daily     folic acid  1 mg Intravenous Once     multivitamin w/minerals  1 tablet Oral Daily     nicotine   Transdermal Q8H     nicotine   Transdermal Daily     pantoprazole (PROTONIX) IV  40 mg Intravenous BID     sodium chloride (PF)  3 mL Intracatheter Q8H     vitamin B1  100 mg Oral Daily       Data   Recent Labs   Lab 07/05/19  0552 07/05/19  0007 07/04/19  1835   WBC 5.4  --  4.9   HGB 10.8* 9.8* 10.8*     --  98     --  222   INR  --   --  1.02     --  131*   POTASSIUM 4.1  --  2.7*   CHLORIDE 100  --  90*   CO2 15*  --  17*   BUN 7  --  11   CR 0.56*  --  0.69*   ANIONGAP 20*  --  24*   LANI 8.4*  --  9.3   GLC 81  --  112*   ALBUMIN 3.4*  --  3.8   PROTTOTAL 5.6*  --  6.4   BILITOTAL 0.7  --  0.9   ALKPHOS 33*  --  34   ALT 13  --  13   AST 23  --  17       No results found for this or any previous visit (from the past 24 hour(s)).

## 2019-07-06 LAB
H PYLORI AG STL QL IA: NORMAL
HGB BLD-MCNC: 10.7 G/DL (ref 13.3–17.7)
MAGNESIUM SERPL-MCNC: 1.5 MG/DL (ref 1.9–2.7)
MAGNESIUM SERPL-MCNC: 2.8 MG/DL (ref 1.9–2.7)
PHOSPHATE SERPL-MCNC: 1.7 MG/DL (ref 2.5–5)
PHOSPHATE SERPL-MCNC: 2 MG/DL (ref 2.5–5)
POTASSIUM SERPL-SCNC: 2.9 MMOL/L (ref 3.5–5.1)
POTASSIUM SERPL-SCNC: 3 MMOL/L (ref 3.5–5.1)
POTASSIUM SERPL-SCNC: 3.6 MMOL/L (ref 3.5–5.1)
SPECIMEN SOURCE: NORMAL

## 2019-07-06 PROCEDURE — C9113 INJ PANTOPRAZOLE SODIUM, VIA: HCPCS | Performed by: INTERNAL MEDICINE

## 2019-07-06 PROCEDURE — 84132 ASSAY OF SERUM POTASSIUM: CPT | Performed by: FAMILY MEDICINE

## 2019-07-06 PROCEDURE — 25000128 H RX IP 250 OP 636: Performed by: FAMILY MEDICINE

## 2019-07-06 PROCEDURE — 84100 ASSAY OF PHOSPHORUS: CPT | Performed by: FAMILY MEDICINE

## 2019-07-06 PROCEDURE — 25000132 ZZH RX MED GY IP 250 OP 250 PS 637: Performed by: FAMILY MEDICINE

## 2019-07-06 PROCEDURE — 99233 SBSQ HOSP IP/OBS HIGH 50: CPT | Performed by: FAMILY MEDICINE

## 2019-07-06 PROCEDURE — 12000000 ZZH R&B MED SURG/OB

## 2019-07-06 PROCEDURE — 25000128 H RX IP 250 OP 636: Performed by: INTERNAL MEDICINE

## 2019-07-06 PROCEDURE — 25800030 ZZH RX IP 258 OP 636: Performed by: FAMILY MEDICINE

## 2019-07-06 PROCEDURE — 85018 HEMOGLOBIN: CPT | Performed by: FAMILY MEDICINE

## 2019-07-06 PROCEDURE — 36415 COLL VENOUS BLD VENIPUNCTURE: CPT | Performed by: FAMILY MEDICINE

## 2019-07-06 PROCEDURE — 25000132 ZZH RX MED GY IP 250 OP 250 PS 637: Performed by: INTERNAL MEDICINE

## 2019-07-06 PROCEDURE — 83735 ASSAY OF MAGNESIUM: CPT | Performed by: FAMILY MEDICINE

## 2019-07-06 PROCEDURE — 25000125 ZZHC RX 250: Performed by: FAMILY MEDICINE

## 2019-07-06 RX ORDER — LOPERAMIDE HCL 2 MG
4 CAPSULE ORAL DAILY PRN
Status: DISCONTINUED | OUTPATIENT
Start: 2019-07-06 | End: 2019-07-08 | Stop reason: HOSPADM

## 2019-07-06 RX ADMIN — MULTIPLE VITAMINS W/ MINERALS TAB 1 TABLET: TAB at 10:02

## 2019-07-06 RX ADMIN — POTASSIUM CHLORIDE 20 MEQ: 1500 TABLET, EXTENDED RELEASE ORAL at 17:00

## 2019-07-06 RX ADMIN — PANTOPRAZOLE SODIUM 40 MG: 40 INJECTION, POWDER, FOR SOLUTION INTRAVENOUS at 07:25

## 2019-07-06 RX ADMIN — MAGNESIUM SULFATE HEPTAHYDRATE 4 G: 40 INJECTION, SOLUTION INTRAVENOUS at 07:24

## 2019-07-06 RX ADMIN — POTASSIUM CHLORIDE 40 MEQ: 1500 TABLET, EXTENDED RELEASE ORAL at 14:33

## 2019-07-06 RX ADMIN — THIAMINE HCL TAB 100 MG 100 MG: 100 TAB at 10:02

## 2019-07-06 RX ADMIN — POTASSIUM CHLORIDE 40 MEQ: 1500 TABLET, EXTENDED RELEASE ORAL at 10:01

## 2019-07-06 RX ADMIN — PANTOPRAZOLE SODIUM 40 MG: 40 INJECTION, POWDER, FOR SOLUTION INTRAVENOUS at 17:00

## 2019-07-06 RX ADMIN — POTASSIUM CHLORIDE 40 MEQ: 1500 TABLET, EXTENDED RELEASE ORAL at 07:25

## 2019-07-06 RX ADMIN — SODIUM PHOSPHATE, MONOBASIC, MONOHYDRATE 15 MMOL: 276; 142 INJECTION, SOLUTION INTRAVENOUS at 12:46

## 2019-07-06 RX ADMIN — NICOTINE 1 PATCH: 21 PATCH, EXTENDED RELEASE TRANSDERMAL at 12:51

## 2019-07-06 RX ADMIN — SODIUM CHLORIDE: 9 INJECTION, SOLUTION INTRAVENOUS at 17:09

## 2019-07-06 ASSESSMENT — ACTIVITIES OF DAILY LIVING (ADL)
ADLS_ACUITY_SCORE: 14
ADLS_ACUITY_SCORE: 14
ADLS_ACUITY_SCORE: 15

## 2019-07-06 ASSESSMENT — MIFFLIN-ST. JEOR: SCORE: 1326.52

## 2019-07-06 NOTE — PROGRESS NOTES
"Prasanna Alonso 64 year old male   Admission date:7/4/2019 - Lives home alone  Code status: Full Code   Isolation: No active isolations   Principal Problem:GI bleed   Diet: regular  Mobility Status: Assist of 1  Discharge timeline & plan: Could discharge today (7/6) if no withdraw symptoms noted. If withdraw noted, stay for inpatient scope on Monday.    Vital signs:  Temp: 97.4  F (36.3  C) Temp src: Tympanic BP: 119/67 Pulse: 102 Heart Rate: 102 Resp: 16 SpO2: 98 % O2 Device: None (Room air)   Height: 162.6 cm (5' 4\") Weight: 61.4 kg (135 lb 6.4 oz)     Abnormal Physical Assessment: Loose stools, brown/yellow, not tarry. Scant amount of blood from rectum. CIWA scores: 1,1, and 1 due to mild tremor. Chronic numbness/tingling in feet. Scattered bruises noted, skin henna/flushed. LS clear/diminished.    Last Pain/PRN Medication given: No pain  Abnormal labs: Several  labs are outside of normal limits, see labs  Telemetry: Yes - normal sinus rhythm. Mildly tachy at times with activity.  Pending tests/procedures planned: Scope Monday. Pt on high electrolyte protocol.       SAFETY CHECKLIST  Arm Bands/signs/magnets (Fall risk) in place:YES  IVF/Medications/rate ordered infusing (do they match the order?):YES  Physical assessment (wounds, incisions, dopplers, LDA's, neuro, CIWA, etc.)YES   Environmental assessment (bed/chair alarm on, call light, side rails, restraints, etc.):{YES   Whiteboard updated:YES    Other: Pt refused ortho BP, pt asymptomatic.   "

## 2019-07-06 NOTE — PLAN OF CARE
Pt VSS, afebrile, denies pain.  Electrolytes low this morning and pt elected to stay for replacement therapy.  Potassium came back low again this afternoon and replacement initiated.  Pt does struggle to get the pills down even when split in half.  Likes to take them with chocolate milk.  Magnesium lab came back at 2.8 form 1.5.  Phosphorus currently running.  Voiding without issue.  Loose brown, green bowel movements.  No bloody stools noted.  Pt currently resting in bed talking on the phone.  Will continue to monitor.  Gerri Siegel RN............................ 7/6/2019 4:54 PM

## 2019-07-06 NOTE — PLAN OF CARE
Problem: Adult Inpatient Plan of Care  Goal: Plan of Care Review  Note:   Pt refused orthostatic BP this evening. Pt is not dizzy or unsteady on feet when ambulating in room. Pt had incontinent BM this evening, stool was noted to be green/brown, no sign of active GI bleed at this time. CIWA score 1 at initial assessment. Will contiue to monitor.

## 2019-07-06 NOTE — PROGRESS NOTES
[facility]  Hospitalist Progress Note    Assessment & Plan   Prasanna Alonso is a 64 year old male who was admitted on 7/4/2019 for several days of dark stool which has resolved but continues to have diarrhea.       GI bleed, resolved. Unclear of UGI vs LGIB but likely UGIB.   -Continue Protonix twice daily  -Avoid aspirin and NSAIDs.  -Encouraged complete cessation from alcohol.  -Advised to cut down on smoking.  -Serial hemoglobin.  Transfuse if hemoglobin less than 7.  -Diet as tolerated.  -plan for endoscopy and colonoscopy on Monday    Alcohol abuse with alcohol withdrawal, no significant withdrawal overnight.   Low phos, magnesium and potassium  -Put him on a CIWA scale and Ativan as needed  -monitor and replace electrolytes  -Continue IV fluids overnight.  Continue oral vitamin with folic acid and thiamine.  -ok to stop telemetry      Tobacco use disorder, smokes about a half a pack per day  -nicotine patch      DVT Prophylaxis: Contraindicated due to acute GI bleeding.  Code Status: Full Code    Rabia Bolaños    Interval History   Again had diarrhea this morning.  Describes about 2 episodes of loose stools per day.  It is not liquidy.  He does not have any abdominal pain.  No fevers or chills.  No significant withdrawal overnight.  Was amenable to staying given his electrolytes are still abnormal.  No further blood in the stool or dark stool.  Did not eat much yesterday but appetite is starting to improve.    -Data reviewed today: I reviewed all new labs and imaging results over the last 24 hours. I personally reviewed no images or EKG's today.    Physical Exam   Temp: 97.8  F (36.6  C) Temp src: Tympanic BP: 101/63   Heart Rate: 95 Resp: 18 SpO2: 94 % O2 Device: None (Room air)    Vitals:    07/04/19 1756 07/06/19 0539   Weight: 61.4 kg (135 lb 6.4 oz) 62.1 kg (136 lb 12.8 oz)     Vital Signs with Ranges  Temp:  [97.4  F (36.3  C)-98.6  F (37  C)] 97.8  F (36.6  C)  Heart Rate:   [] 95  Resp:  [16-18] 18  BP: (101-177)/(63-68) 101/63  SpO2:  [94 %-99 %] 94 %  I/O last 3 completed shifts:  In: 3372 [P.O.:640; I.V.:2732]  Out: 1300 [Urine:1200; Stool:100]    Constitutional: Awake and alert.  No tremor or acute signs of withdrawal malnourished and underweight.  Respiratory: Clear to auscultation bilaterally.  No wheezing, rhonchi, rales.  Cardiovascular: Regular rate.  No murmur.  GI: Abdomen soft, nontender, nondistended.  There is no ascites present.  No fluid wave.  No organomegaly.  Skin/Integumen: Warm, dry, intact.  Telangiectasias of the nose and chest area.  Other:      Medications     sodium chloride 75 mL/hr at 07/06/19 0855       multivitamin w/minerals  1 tablet Oral Daily     nicotine   Transdermal Q8H     nicotine   Transdermal Daily     pantoprazole (PROTONIX) IV  40 mg Intravenous BID     sodium chloride (PF)  3 mL Intracatheter Q8H     vitamin B1  100 mg Oral Daily       Data   Recent Labs   Lab 07/06/19  0540 07/05/19  0552 07/05/19  0007 07/04/19  1835   WBC  --  5.4  --  4.9   HGB 10.7* 10.8* 9.8* 10.8*   MCV  --  100  --  98   PLT  --  207  --  222   INR  --   --   --  1.02   NA  --  135  --  131*   POTASSIUM 2.9* 4.1  --  2.7*   CHLORIDE  --  100  --  90*   CO2  --  15*  --  17*   BUN  --  7  --  11   CR  --  0.56*  --  0.69*   ANIONGAP  --  20*  --  24*   LANI  --  8.4*  --  9.3   GLC  --  81  --  112*   ALBUMIN  --  3.4*  --  3.8   PROTTOTAL  --  5.6*  --  6.4   BILITOTAL  --  0.7  --  0.9   ALKPHOS  --  33*  --  34   ALT  --  13  --  13   AST  --  23  --  17       No results found for this or any previous visit (from the past 24 hour(s)).

## 2019-07-06 NOTE — PROGRESS NOTES
"Prasanna Alonso 64 year old male   Admission date:7/4/2019   Code status: Full Code   Isolation:No active isolations   Principal Problem:GI bleed   Diet: regular  Discharge timeline & plan: unsure of discharge date and/or discharge needs at this time.  Patient will be staying until scopes done which are scheduled for Monday.    Vital signs:  Temp: 97.8  F (36.6  C) Temp src: Tympanic BP: 101/63   Heart Rate: 95 Resp: 18 SpO2: 94 % O2 Device: None (Room air)   Height: 162.6 cm (5' 4\") Weight: 62.1 kg (136 lb 12.8 oz)  Estimated body mass index is 23.48 kg/m  as calculated from the following:    Height as of this encounter: 1.626 m (5' 4\").    Weight as of this encounter: 62.1 kg (136 lb 12.8 oz).     Abnormal Physical Assessment:  Patient had two loose stools, no blood/tarry stools noted.  CIWA scores have remained 1, no ativan needed.  VSS, afebrile.  Patient requested to stay in hospital d/t having difficulty at home with loose stools.  Also requiring replacement of all electrolytes.     Last Pain/PRN Medication given: patient has declined need for pain medication, denies pain this shfit.     Abnormal labs:  Hemoglobin remains stable  Also low potassium, magnesium, phosphorous.  All replaced this shift.  See lab results.  Your hemoglobin result  Lab Results   Component Value Date    HGB 10.7 07/06/2019       Telemetry: Yes - tachycardic  Dr. Cano aware when telemetry was discontinued, plan per MD is to continue to rehydrate patient and monitor scheduled VS.  Patient very uncomfortable with tele box, MD ok to d/c  Pending tests/procedures planned:  Scopes scheduled for Monday.      SAFETY CHECKLIST  Arm Bands/signs/magnets (code status, fall risk, allergy, limb, etc.) in place:YES  IVF/Medications/rate ordered infusing (do they match the order?):YES  Physical assessment (wounds, incisions, dopplers, LDA's, neuro, CIWA, etc.)YES   Environmental assessment (bed/chair alarm on, call light, side rails, restraints, " etc.):{YES   Whiteboard updated:YES

## 2019-07-07 LAB
ANION GAP SERPL CALCULATED.3IONS-SCNC: 7 MMOL/L (ref 3–14)
BUN SERPL-MCNC: <2 MG/DL (ref 7–25)
CALCIUM SERPL-MCNC: 7.8 MG/DL (ref 8.6–10.3)
CHLORIDE SERPL-SCNC: 104 MMOL/L (ref 98–107)
CO2 SERPL-SCNC: 27 MMOL/L (ref 21–31)
CREAT SERPL-MCNC: 0.37 MG/DL (ref 0.7–1.3)
GFR SERPL CREATININE-BSD FRML MDRD: >90 ML/MIN/{1.73_M2}
GLUCOSE SERPL-MCNC: 123 MG/DL (ref 70–105)
HGB BLD-MCNC: 9.2 G/DL (ref 13.3–17.7)
MAGNESIUM SERPL-MCNC: 1.7 MG/DL (ref 1.9–2.7)
PHOSPHATE SERPL-MCNC: 1.7 MG/DL (ref 2.5–5)
PHOSPHATE SERPL-MCNC: 3 MG/DL (ref 2.5–5)
POTASSIUM SERPL-SCNC: 3 MMOL/L (ref 3.5–5.1)
POTASSIUM SERPL-SCNC: 3.1 MMOL/L (ref 3.5–5.1)
POTASSIUM SERPL-SCNC: 3.3 MMOL/L (ref 3.5–5.1)
SODIUM SERPL-SCNC: 138 MMOL/L (ref 134–144)

## 2019-07-07 PROCEDURE — 36415 COLL VENOUS BLD VENIPUNCTURE: CPT | Performed by: FAMILY MEDICINE

## 2019-07-07 PROCEDURE — 25000128 H RX IP 250 OP 636: Performed by: INTERNAL MEDICINE

## 2019-07-07 PROCEDURE — 12000000 ZZH R&B MED SURG/OB

## 2019-07-07 PROCEDURE — C9113 INJ PANTOPRAZOLE SODIUM, VIA: HCPCS | Performed by: INTERNAL MEDICINE

## 2019-07-07 PROCEDURE — 25000125 ZZHC RX 250: Performed by: FAMILY MEDICINE

## 2019-07-07 PROCEDURE — 25000128 H RX IP 250 OP 636: Performed by: FAMILY MEDICINE

## 2019-07-07 PROCEDURE — 25800030 ZZH RX IP 258 OP 636: Performed by: FAMILY MEDICINE

## 2019-07-07 PROCEDURE — 84100 ASSAY OF PHOSPHORUS: CPT | Performed by: FAMILY MEDICINE

## 2019-07-07 PROCEDURE — 83735 ASSAY OF MAGNESIUM: CPT | Performed by: FAMILY MEDICINE

## 2019-07-07 PROCEDURE — 25000132 ZZH RX MED GY IP 250 OP 250 PS 637: Performed by: INTERNAL MEDICINE

## 2019-07-07 PROCEDURE — 84132 ASSAY OF SERUM POTASSIUM: CPT | Performed by: FAMILY MEDICINE

## 2019-07-07 PROCEDURE — 25000132 ZZH RX MED GY IP 250 OP 250 PS 637: Performed by: FAMILY MEDICINE

## 2019-07-07 PROCEDURE — 99233 SBSQ HOSP IP/OBS HIGH 50: CPT | Performed by: FAMILY MEDICINE

## 2019-07-07 PROCEDURE — 80048 BASIC METABOLIC PNL TOTAL CA: CPT | Performed by: FAMILY MEDICINE

## 2019-07-07 PROCEDURE — 85018 HEMOGLOBIN: CPT | Performed by: FAMILY MEDICINE

## 2019-07-07 RX ORDER — POLYETHYLENE GLYCOL 3350, SODIUM CHLORIDE, SODIUM BICARBONATE, POTASSIUM CHLORIDE 420; 11.2; 5.72; 1.48 G/4L; G/4L; G/4L; G/4L
4000 POWDER, FOR SOLUTION ORAL ONCE
Status: COMPLETED | OUTPATIENT
Start: 2019-07-07 | End: 2019-07-07

## 2019-07-07 RX ADMIN — SODIUM PHOSPHATE, MONOBASIC, MONOHYDRATE 20 MMOL: 276; 142 INJECTION, SOLUTION INTRAVENOUS at 09:27

## 2019-07-07 RX ADMIN — POLYETHYLENE GLYCOL 3350, SODIUM CHLORIDE, SODIUM BICARBONATE AND POTASSIUM CHLORIDE 4000 ML: KIT ORAL at 11:56

## 2019-07-07 RX ADMIN — SODIUM CHLORIDE: 9 INJECTION, SOLUTION INTRAVENOUS at 06:59

## 2019-07-07 RX ADMIN — POTASSIUM CHLORIDE 10 MEQ: 7.46 INJECTION, SOLUTION INTRAVENOUS at 15:50

## 2019-07-07 RX ADMIN — THIAMINE HCL TAB 100 MG 100 MG: 100 TAB at 11:06

## 2019-07-07 RX ADMIN — PANTOPRAZOLE SODIUM 40 MG: 40 INJECTION, POWDER, FOR SOLUTION INTRAVENOUS at 16:59

## 2019-07-07 RX ADMIN — POTASSIUM CHLORIDE 10 MEQ: 7.46 INJECTION, SOLUTION INTRAVENOUS at 14:32

## 2019-07-07 RX ADMIN — POTASSIUM CHLORIDE 10 MEQ: 7.46 INJECTION, SOLUTION INTRAVENOUS at 17:14

## 2019-07-07 RX ADMIN — MAGNESIUM SULFATE HEPTAHYDRATE 2 G: 40 INJECTION, SOLUTION INTRAVENOUS at 07:40

## 2019-07-07 RX ADMIN — POTASSIUM CHLORIDE 10 MEQ: 7.46 INJECTION, SOLUTION INTRAVENOUS at 18:38

## 2019-07-07 RX ADMIN — NICOTINE 1 PATCH: 21 PATCH, EXTENDED RELEASE TRANSDERMAL at 11:06

## 2019-07-07 RX ADMIN — PANTOPRAZOLE SODIUM 40 MG: 40 INJECTION, POWDER, FOR SOLUTION INTRAVENOUS at 07:34

## 2019-07-07 RX ADMIN — MULTIPLE VITAMINS W/ MINERALS TAB 1 TABLET: TAB at 11:06

## 2019-07-07 RX ADMIN — POTASSIUM CHLORIDE 20 MEQ: 1500 TABLET, EXTENDED RELEASE ORAL at 04:00

## 2019-07-07 ASSESSMENT — ACTIVITIES OF DAILY LIVING (ADL)
ADLS_ACUITY_SCORE: 14

## 2019-07-07 ASSESSMENT — MIFFLIN-ST. JEOR: SCORE: 1330.15

## 2019-07-07 NOTE — PLAN OF CARE
Pt VSS, afebrile, denies pain.  Started colon prep today for colonoscopy tomorrow at 1100.  Tolerating well.  Stools are liquid and almost clear.  Had some red color to it earlier, but it was decided it was from the strawberry jello the pt ate this morning.  All electrolytes replaced today.  Potassium was replace IV due to colon prep. Potassium running at 75/50 with normal saline.  Currently on bag 2 of 4.  Pt tolerating all replacements well. Ordered Gatorade for dinner.  Will continue to monitor.  Gerri Siegel RN............................ 7/7/2019 4:40 PM

## 2019-07-07 NOTE — PROGRESS NOTES
[facility]  Hospitalist Progress Note    Assessment & Plan   Prasanna Alonso is a 64 year old male who was admitted on 7/4/2019 for several days of dark stool which has resolved but continues to have diarrhea.       GI bleed, resolved. Unclear of UGI vs LGIB but likely UGIB.  No further bleeding episodes.  Hemoglobin has been stable.  Plan for colonoscopy and endoscopy tomorrow.  This was initially planned for outpatient but given his electrolyte abnormalities we will keep him in the hospital and do this as inpatient tomorrow.  -Continue Protonix twice daily  -Avoid aspirin and NSAIDs.  -Encouraged complete cessation from alcohol.  -Advised to cut down on smoking.  -Serial hemoglobin.  Transfuse if hemoglobin less than 7.  -Clear liquid diet today and n.p.o. at midnight.  Will start prep for colonoscopy this afternoon.  -plan for endoscopy and colonoscopy on Monday    Alcohol abuse with alcohol withdrawal, no significant withdrawal since admission.   Low phos, magnesium and potassium  - CIWA scale and Ativan as needed  -monitor and replace electrolytes  -Continue IV fluids overnight.  Continue oral vitamin with folic acid and thiamine.      Tobacco use disorder, smokes about a half a pack per day  -nicotine patch      DVT Prophylaxis: Contraindicated due to acute GI bleeding.  Code Status: Full Code    Rabia Bolaños    Interval History   Diarrhea resolved this morning.  No abdominal pain.  No fevers or chills.  No withdrawal.  No headaches, chest pain or shortness of breath.  Is eating breakfast.  He will require prep today for colonoscopy tomorrow.  Discussed at length.  Slept well overnight.    -Data reviewed today: I reviewed all new labs and imaging results over the last 24 hours. I personally reviewed no images or EKG's today.    Physical Exam   Temp: 97.8  F (36.6  C) Temp src: Tympanic BP: 107/67   Heart Rate: 94 Resp: 18 SpO2: 97 % O2 Device: None (Room air)    Vitals:    07/04/19  1756 07/06/19 0539 07/07/19 0300   Weight: 61.4 kg (135 lb 6.4 oz) 62.1 kg (136 lb 12.8 oz) 62.4 kg (137 lb 9.6 oz)     Vital Signs with Ranges  Temp:  [97.8  F (36.6  C)-98.7  F (37.1  C)] 97.8  F (36.6  C)  Heart Rate:  [] 94  Resp:  [18] 18  BP: (104-111)/(64-67) 107/67  SpO2:  [96 %-99 %] 97 %  I/O last 3 completed shifts:  In: 1996 [P.O.:360; I.V.:1636]  Out: 2650 [Urine:2650]    Constitutional: Awake and alert.  No tremor or acute signs of withdrawal malnourished and underweight.  Respiratory: Clear to auscultation bilaterally.  No wheezing, rhonchi, rales.  Cardiovascular: Regular rate.  No murmur.  GI: Abdomen soft, nontender, nondistended.  There is no ascites present.  No fluid wave.  No organomegaly.  Skin/Integumen: Warm, dry, intact.  Telangiectasias of the nose and chest area.  Other:      Medications     sodium chloride 75 mL/hr at 07/07/19 0659       multivitamin w/minerals  1 tablet Oral Daily     nicotine   Transdermal Q8H     nicotine   Transdermal Daily     pantoprazole (PROTONIX) IV  40 mg Intravenous BID     polyethylene glycol-electrolytes  4,000 mL Oral Once     sodium chloride (PF)  3 mL Intracatheter Q8H     vitamin B1  100 mg Oral Daily       Data   Recent Labs   Lab 07/07/19  0545 07/06/19  2120 07/06/19  1205 07/06/19  0540 07/05/19  0552  07/04/19  1835   WBC  --   --   --   --  5.4  --  4.9   HGB 9.2*  --   --  10.7* 10.8*   < > 10.8*   MCV  --   --   --   --  100  --  98   PLT  --   --   --   --  207  --  222   INR  --   --   --   --   --   --  1.02     --   --   --  135  --  131*   POTASSIUM 3.3* 3.6 3.0* 2.9* 4.1  --  2.7*   CHLORIDE 104  --   --   --  100  --  90*   CO2 27  --   --   --  15*  --  17*   BUN <2*  --   --   --  7  --  11   CR 0.37*  --   --   --  0.56*  --  0.69*   ANIONGAP 7  --   --   --  20*  --  24*   LANI 7.8*  --   --   --  8.4*  --  9.3   *  --   --   --  81  --  112*   ALBUMIN  --   --   --   --  3.4*  --  3.8   PROTTOTAL  --   --   --   --   5.6*  --  6.4   BILITOTAL  --   --   --   --  0.7  --  0.9   ALKPHOS  --   --   --   --  33*  --  34   ALT  --   --   --   --  13  --  13   AST  --   --   --   --  23  --  17    < > = values in this interval not displayed.       No results found for this or any previous visit (from the past 24 hour(s)).

## 2019-07-07 NOTE — PROGRESS NOTES
"Prasanna Alonso 64 year old male   Admission date:7/4/2019   Code status: Full Code   Isolation:No active isolations   Principal Problem:GI bleed   Diet: regular  Mobility Status: SBA  Discharge timeline & plan: May discharge today    Vital signs:  Temp: 98.7  F (37.1  C) Temp src: Tympanic BP: 111/64   Heart Rate: 98 Resp: 18 SpO2: 98 % O2 Device: None (Room air)   Height: 162.6 cm (5' 4\") Weight: 62.4 kg (137 lb 9.6 oz)     Abnormal Physical Assessment: Up to bathroom with SBA. Voiding well, stool formed and light brown. Pt denies pain, CIWAs have been 2 due to tremors. No ativan needed. VSS, pt using call light appropriately. Potassium replacement given at 0400 for 3.6.     Last Pain/PRN Medication given: None  Abnormal labs: Potassium 3.6, other electrolytes off.   Telemetry: No  Pending tests/procedures planned: Scope on Monday      SAFETY CHECKLIST  Arm Bands/signs/magnets (DNR, Fall risk, Allergy, Latex, Limb, etc.) in place:YES  IVF/Medications/rate ordered infusing (do they match the order?):YES  Physical assessment (wounds, incisions, dopplers, LDA's, neuro, CIWA, etc.)YES   Environmental assessment (bed/chair alarm on, call light, side rails, restraints, etc.):{YES   Whiteboard updated:YES            "

## 2019-07-07 NOTE — PLAN OF CARE
Problem: Adult Inpatient Plan of Care  Goal: Plan of Care Review  Note:   Pt had uneventful night. Up to bathroom with SBA. Voiding well, stool formed and light brown. Pt denies pain, CIWAs have been 2 due to tremors. VSS, pt using call light appropriately.   Blanka Ames RN on 7/7/2019 at 5:35 AM

## 2019-07-08 ENCOUNTER — ANESTHESIA EVENT (OUTPATIENT)
Dept: SURGERY | Facility: OTHER | Age: 64
DRG: 378 | End: 2019-07-08
Payer: MEDICARE

## 2019-07-08 ENCOUNTER — ANESTHESIA (OUTPATIENT)
Dept: SURGERY | Facility: OTHER | Age: 64
DRG: 378 | End: 2019-07-08
Payer: MEDICARE

## 2019-07-08 VITALS
HEIGHT: 64 IN | WEIGHT: 137.6 LBS | DIASTOLIC BLOOD PRESSURE: 77 MMHG | TEMPERATURE: 96.5 F | SYSTOLIC BLOOD PRESSURE: 125 MMHG | RESPIRATION RATE: 15 BRPM | BODY MASS INDEX: 23.49 KG/M2 | OXYGEN SATURATION: 97 % | HEART RATE: 107 BPM

## 2019-07-08 LAB
ANION GAP SERPL CALCULATED.3IONS-SCNC: 6 MMOL/L (ref 3–14)
BUN SERPL-MCNC: 2 MG/DL (ref 7–25)
CALCIUM SERPL-MCNC: 8 MG/DL (ref 8.6–10.3)
CHLORIDE SERPL-SCNC: 103 MMOL/L (ref 98–107)
CO2 SERPL-SCNC: 29 MMOL/L (ref 21–31)
CREAT SERPL-MCNC: 0.51 MG/DL (ref 0.7–1.3)
GFR SERPL CREATININE-BSD FRML MDRD: >90 ML/MIN/{1.73_M2}
GLUCOSE SERPL-MCNC: 95 MG/DL (ref 70–105)
HGB BLD-MCNC: 9.5 G/DL (ref 13.3–17.7)
MAGNESIUM SERPL-MCNC: 1.9 MG/DL (ref 1.9–2.7)
PHOSPHATE SERPL-MCNC: 3.3 MG/DL (ref 2.5–5)
POTASSIUM SERPL-SCNC: 3.4 MMOL/L (ref 3.5–5.1)
POTASSIUM SERPL-SCNC: 3.7 MMOL/L (ref 3.5–5.1)
SODIUM SERPL-SCNC: 138 MMOL/L (ref 134–144)

## 2019-07-08 PROCEDURE — 45380 COLONOSCOPY AND BIOPSY: CPT | Mod: XU | Performed by: SURGERY

## 2019-07-08 PROCEDURE — 40000010 ZZH STATISTIC ANES STAT CODE-CRNA PER MINUTE: Performed by: SURGERY

## 2019-07-08 PROCEDURE — 43239 EGD BIOPSY SINGLE/MULTIPLE: CPT | Performed by: SURGERY

## 2019-07-08 PROCEDURE — C9113 INJ PANTOPRAZOLE SODIUM, VIA: HCPCS | Performed by: INTERNAL MEDICINE

## 2019-07-08 PROCEDURE — 45380 COLONOSCOPY AND BIOPSY: CPT | Performed by: SURGERY

## 2019-07-08 PROCEDURE — 25800030 ZZH RX IP 258 OP 636: Performed by: SURGERY

## 2019-07-08 PROCEDURE — 25800030 ZZH RX IP 258 OP 636: Performed by: FAMILY MEDICINE

## 2019-07-08 PROCEDURE — 99239 HOSP IP/OBS DSCHRG MGMT >30: CPT | Performed by: FAMILY MEDICINE

## 2019-07-08 PROCEDURE — 85018 HEMOGLOBIN: CPT | Performed by: FAMILY MEDICINE

## 2019-07-08 PROCEDURE — 25000125 ZZHC RX 250: Performed by: SURGERY

## 2019-07-08 PROCEDURE — 0DB48ZX EXCISION OF ESOPHAGOGASTRIC JUNCTION, VIA NATURAL OR ARTIFICIAL OPENING ENDOSCOPIC, DIAGNOSTIC: ICD-10-PCS | Performed by: SURGERY

## 2019-07-08 PROCEDURE — 25000128 H RX IP 250 OP 636: Performed by: FAMILY MEDICINE

## 2019-07-08 PROCEDURE — 83735 ASSAY OF MAGNESIUM: CPT | Performed by: FAMILY MEDICINE

## 2019-07-08 PROCEDURE — 25000128 H RX IP 250 OP 636: Performed by: NURSE ANESTHETIST, CERTIFIED REGISTERED

## 2019-07-08 PROCEDURE — 36415 COLL VENOUS BLD VENIPUNCTURE: CPT | Performed by: FAMILY MEDICINE

## 2019-07-08 PROCEDURE — 45385 COLONOSCOPY W/LESION REMOVAL: CPT | Performed by: NURSE ANESTHETIST, CERTIFIED REGISTERED

## 2019-07-08 PROCEDURE — 25000125 ZZHC RX 250: Performed by: NURSE ANESTHETIST, CERTIFIED REGISTERED

## 2019-07-08 PROCEDURE — 88305 TISSUE EXAM BY PATHOLOGIST: CPT

## 2019-07-08 PROCEDURE — 84100 ASSAY OF PHOSPHORUS: CPT | Performed by: FAMILY MEDICINE

## 2019-07-08 PROCEDURE — 43239 EGD BIOPSY SINGLE/MULTIPLE: CPT | Mod: 51 | Performed by: SURGERY

## 2019-07-08 PROCEDURE — 0DB78ZX EXCISION OF STOMACH, PYLORUS, VIA NATURAL OR ARTIFICIAL OPENING ENDOSCOPIC, DIAGNOSTIC: ICD-10-PCS | Performed by: SURGERY

## 2019-07-08 PROCEDURE — 45385 COLONOSCOPY W/LESION REMOVAL: CPT | Performed by: SURGERY

## 2019-07-08 PROCEDURE — 0DBL8ZX EXCISION OF TRANSVERSE COLON, VIA NATURAL OR ARTIFICIAL OPENING ENDOSCOPIC, DIAGNOSTIC: ICD-10-PCS | Performed by: SURGERY

## 2019-07-08 PROCEDURE — 84132 ASSAY OF SERUM POTASSIUM: CPT | Performed by: FAMILY MEDICINE

## 2019-07-08 PROCEDURE — 0DBE8ZX EXCISION OF LARGE INTESTINE, VIA NATURAL OR ARTIFICIAL OPENING ENDOSCOPIC, DIAGNOSTIC: ICD-10-PCS | Performed by: SURGERY

## 2019-07-08 PROCEDURE — 25000128 H RX IP 250 OP 636: Performed by: INTERNAL MEDICINE

## 2019-07-08 PROCEDURE — 80048 BASIC METABOLIC PNL TOTAL CA: CPT | Performed by: FAMILY MEDICINE

## 2019-07-08 RX ORDER — METOCLOPRAMIDE 10 MG/1
10 TABLET ORAL EVERY 6 HOURS PRN
Status: CANCELLED | OUTPATIENT
Start: 2019-07-08

## 2019-07-08 RX ORDER — NALOXONE HYDROCHLORIDE 0.4 MG/ML
.1-.4 INJECTION, SOLUTION INTRAMUSCULAR; INTRAVENOUS; SUBCUTANEOUS
Status: CANCELLED | OUTPATIENT
Start: 2019-07-08 | End: 2019-07-09

## 2019-07-08 RX ORDER — PROCHLORPERAZINE MALEATE 10 MG
10 TABLET ORAL EVERY 6 HOURS PRN
Status: CANCELLED | OUTPATIENT
Start: 2019-07-08

## 2019-07-08 RX ORDER — ONDANSETRON 4 MG/1
4 TABLET, ORALLY DISINTEGRATING ORAL EVERY 6 HOURS PRN
Status: CANCELLED | OUTPATIENT
Start: 2019-07-08

## 2019-07-08 RX ORDER — METOCLOPRAMIDE HYDROCHLORIDE 5 MG/ML
10 INJECTION INTRAMUSCULAR; INTRAVENOUS EVERY 6 HOURS PRN
Status: CANCELLED | OUTPATIENT
Start: 2019-07-08

## 2019-07-08 RX ORDER — LIDOCAINE 40 MG/G
CREAM TOPICAL
Status: DISCONTINUED | OUTPATIENT
Start: 2019-07-08 | End: 2019-07-08 | Stop reason: HOSPADM

## 2019-07-08 RX ORDER — FLUMAZENIL 0.1 MG/ML
0.2 INJECTION, SOLUTION INTRAVENOUS
Status: CANCELLED | OUTPATIENT
Start: 2019-07-08 | End: 2019-07-08

## 2019-07-08 RX ORDER — PROPOFOL 10 MG/ML
INJECTION, EMULSION INTRAVENOUS CONTINUOUS PRN
Status: DISCONTINUED | OUTPATIENT
Start: 2019-07-08 | End: 2019-07-08

## 2019-07-08 RX ORDER — LIDOCAINE HYDROCHLORIDE 20 MG/ML
INJECTION, SOLUTION INFILTRATION; PERINEURAL PRN
Status: DISCONTINUED | OUTPATIENT
Start: 2019-07-08 | End: 2019-07-08

## 2019-07-08 RX ORDER — SODIUM CHLORIDE, SODIUM LACTATE, POTASSIUM CHLORIDE, CALCIUM CHLORIDE 600; 310; 30; 20 MG/100ML; MG/100ML; MG/100ML; MG/100ML
INJECTION, SOLUTION INTRAVENOUS CONTINUOUS
Status: DISCONTINUED | OUTPATIENT
Start: 2019-07-08 | End: 2019-07-08 | Stop reason: HOSPADM

## 2019-07-08 RX ORDER — ONDANSETRON 2 MG/ML
4 INJECTION INTRAMUSCULAR; INTRAVENOUS EVERY 6 HOURS PRN
Status: CANCELLED | OUTPATIENT
Start: 2019-07-08

## 2019-07-08 RX ORDER — ONDANSETRON 2 MG/ML
4 INJECTION INTRAMUSCULAR; INTRAVENOUS
Status: DISCONTINUED | OUTPATIENT
Start: 2019-07-08 | End: 2019-07-08 | Stop reason: HOSPADM

## 2019-07-08 RX ORDER — PROPOFOL 10 MG/ML
INJECTION, EMULSION INTRAVENOUS PRN
Status: DISCONTINUED | OUTPATIENT
Start: 2019-07-08 | End: 2019-07-08

## 2019-07-08 RX ORDER — SODIUM CHLORIDE, SODIUM LACTATE, POTASSIUM CHLORIDE, CALCIUM CHLORIDE 600; 310; 30; 20 MG/100ML; MG/100ML; MG/100ML; MG/100ML
INJECTION, SOLUTION INTRAVENOUS CONTINUOUS
Status: CANCELLED | OUTPATIENT
Start: 2019-07-08

## 2019-07-08 RX ADMIN — POTASSIUM CHLORIDE 10 MEQ: 7.46 INJECTION, SOLUTION INTRAVENOUS at 01:33

## 2019-07-08 RX ADMIN — POTASSIUM CHLORIDE 10 MEQ: 7.46 INJECTION, SOLUTION INTRAVENOUS at 00:08

## 2019-07-08 RX ADMIN — PROPOFOL 140 MCG/KG/MIN: 10 INJECTION, EMULSION INTRAVENOUS at 11:59

## 2019-07-08 RX ADMIN — SODIUM CHLORIDE: 9 INJECTION, SOLUTION INTRAVENOUS at 05:57

## 2019-07-08 RX ADMIN — PANTOPRAZOLE SODIUM 40 MG: 40 INJECTION, POWDER, FOR SOLUTION INTRAVENOUS at 08:17

## 2019-07-08 RX ADMIN — MAGNESIUM SULFATE HEPTAHYDRATE 2 G: 40 INJECTION, SOLUTION INTRAVENOUS at 05:57

## 2019-07-08 RX ADMIN — POTASSIUM CHLORIDE 10 MEQ: 7.46 INJECTION, SOLUTION INTRAVENOUS at 02:57

## 2019-07-08 RX ADMIN — LIDOCAINE HYDROCHLORIDE 80 MG: 20 INJECTION, SOLUTION INFILTRATION; PERINEURAL at 11:59

## 2019-07-08 RX ADMIN — POTASSIUM CHLORIDE 10 MEQ: 7.46 INJECTION, SOLUTION INTRAVENOUS at 04:30

## 2019-07-08 RX ADMIN — PROPOFOL 100 MG: 10 INJECTION, EMULSION INTRAVENOUS at 11:59

## 2019-07-08 RX ADMIN — SODIUM CHLORIDE, POTASSIUM CHLORIDE, SODIUM LACTATE AND CALCIUM CHLORIDE: 600; 310; 30; 20 INJECTION, SOLUTION INTRAVENOUS at 10:42

## 2019-07-08 ASSESSMENT — ACTIVITIES OF DAILY LIVING (ADL)
ADLS_ACUITY_SCORE: 14

## 2019-07-08 ASSESSMENT — LIFESTYLE VARIABLES: TOBACCO_USE: 1

## 2019-07-08 NOTE — PHARMACY - DISCHARGE MEDICATION RECONCILIATION AND EDUCATION
Pharmacy:  Discharge Counseling and Medication Reconciliation    Prasanna Cordovadrickson  [address]    [facility] [address]  [phone] (home)   64 year old male  PCP: No Ref-Primary, Physician    Allergies: Patient has no known allergies.    Discharge Counseling:    Pharmacist met with patient (and/or family) today to review the medication portion of the After Visit Summary (with an emphasis on NEW medications) and to address patient's questions/concerns.    Summary of Education: met with patient to discuss new medication protonix.  Discussed side effects, administration and what to expect from medication. Discussed MVI use, to buy OTC.  Stressed importance of stopping ibuprofen/other NSAIDS.  Patient states rare use, talked about using APAP for arthritis/pain.  Patient understood.    Materials Provided:  MedCounselor sheets printed from Clinical Pharmacology on: protonix    Discharge Medication Reconciliation:    Alena Ferrell RPH has reviewed the patient's discharge medication orders and has compared them to the inpatient medication administration record and to what the patient was taking prior to admission - any discrepancies have been resolved.    It has been determined that the patient has an adequate supply of medications available or which can be obtained from the patient's preferred pharmacy, which HE/SHE has confirmed as: [facility]    Thank you for the consult.    Alena Ferrell RPH........July 8, 2019 9:47 AM

## 2019-07-08 NOTE — PROGRESS NOTES
Patient discharged to home via private vehicle driven by friend. Belongings sent with patient. Discharge instructions provided and discussed, f/u appointment made. Patient verbalized comfort in discharging home today.   KWAME JESUS RN on 7/8/2019 at 3:41 PM

## 2019-07-08 NOTE — ANESTHESIA POSTPROCEDURE EVALUATION
Patient: Prasanna Alonso    Procedure(s):  ESOPHAGOGASTRODUODENOSCOPY, WITH BIOPSY  COLONOSCOPY, WITH POLYPECTOMY AND BIOPSY    Diagnosis:GI BLEEDING  Diagnosis Additional Information: No value filed.    Anesthesia Type:  MAC    Note:  Anesthesia Post Evaluation    Patient location during evaluation: PACU  Patient participation: Able to fully participate in evaluation  Level of consciousness: awake and alert  Pain management: adequate  Airway patency: patent  Cardiovascular status: acceptable  Respiratory status: acceptable  Hydration status: acceptable  PONV: none     Anesthetic complications: None          Last vitals:  Vitals:    07/08/19 0422 07/08/19 0832 07/08/19 1014   BP: 104/56 112/69 112/71   Pulse:  91 110   Resp: 16 16 16   Temp: 98.4  F (36.9  C) 96.7  F (35.9  C) 97  F (36.1  C)   SpO2: 96% 94% 99%         Electronically Signed By: KAELA ROCA CRNA  July 8, 2019  12:41 PM

## 2019-07-08 NOTE — OP NOTE
PROCEDURE NOTE    SURGEON: Adalberto Cortes MD    PRE-OP DIAGNOSIS:   GI bleeding, anemia      POST-OP DIAGNOSIS: Anemia, No apparent source    PROCEDURE PLANNED:   Diagnostic EGD      Diagnostic Colonoscopy    PROCEDURE PERFORMED:  Flexible EGD with biopsies, Colonoscopy with biopsy and snare polypectomy    SPECIMEN:  Duodenum, antrum, distal esophagus, random colon, transverse colon polyps X 2.     ANESTHESIA:  Monitor Anesthesia Care  CRNA Independent: Chriss Persaud APRN CRNA  Anesthesia coverage requested due to EtOH use    ESTIMATED BLOOD LOSS: none    COMPLICATIONS:  None    INDICATION FOR THE PROCEDURE: The patient is a 64 year old male. The patient presents with GI bleeding and anemia. I explained to the patient the risks, benefits and alternatives to diagnostic EGD and colonoscopy for evaluating for Gi blood loss. We specifically discussed the risks of bleeding, infection, perforation, potential inability to reach the cecum and the risks of sedation. The patient's questions were answered and the patient wished to proceed. Informedconsent paperwork was completed.    PROCEDURE: The patient was taken to the endoscopy suite. Appropriate monitors were attached.  The patient was placed in the left lateral decubitus position. Bite block was positioned.Timeout was performed confirming the patient's identity and procedure to be performed.  After appropriate sedation was confirmed, the flexible endoscope was advanced into the oropharynx. The posterior oropharynx appeared grossly normal. The scope was advanced into the proximal esophagus. The esophagus was insufflated with air. The scope was advanced under direct visualization. No acute abnormalities of the esophagus were noted. The scope was advanced into the stomach. The scope was advanced through the pylorus into the duodenal bulb. The bulb and distal duodenum appeared grossly normal. The scope was withdrawn back into the stomach. Antral biopsy wasobtained and  sent to pathology. The scope was retroflexed and the GE junction inspected. No abnormalities were noted. The scope was returned to a neutral position and the stomach was decompressed. The scope was withdrawnto the GE junction and biopsy obtained. The mucosa of the esophagus was inspected while withdrawing the scope. No abnormalities were noted. The scope was withdrawn from the patient. The bite block was removed.    Thepatient was repositioned. After appropriate sedation, digital rectal exam was performed.  There was normal tone and no gross abnormality was noted.  The lubricated flexible colonoscope was introduced into the anus the colonwas insufflated with air. The prep quality was adequate. Under direct visualization the scope was advanced to the cecum. The ileocecal valve was intubated and the terminal ileum inspected. No gross abnormality was noted. The scope was withdrawn back into the cecum. The mucosa of colon was inspected while withdrawing the scope. A 4 mm polyp and a 7 mm polyp were removed with cold snare from the transverse colon.  Random biopsies were taken from cecum to rectum. The scope was retroflexed in the rectum and the anorectal junction was inspected. No abnormalities were noted. The scope was returned to a neutral position and the colon was decompressed. The scope was removed. The patient tolerated the procedure with no immediately apparent complication. The patient wastaken to recovery in stable condition.    FOLLOW UP:  RECOMMENDATIONS Follow-up pathology. Abstain from EtOh    Adalberto Cortes     CC: No Ref-Primary, Physician

## 2019-07-08 NOTE — PROGRESS NOTES
Patient transferred to OR for scope procedure. Report given to Sheryl.   KWAME JESUS RN on 7/8/2019 at 10:32 AM

## 2019-07-08 NOTE — ANESTHESIA PREPROCEDURE EVALUATION
Anesthesia Pre-Procedure Evaluation    Patient: Prasanna Alonso   MRN: [id number] : 1955          Preoperative Diagnosis: GI BLEEDING    Procedure(s):  ESOPHAGOGASTRODUODENOSCOPY (EGD)  COLONOSCOPY    Past Medical History:   Diagnosis Date     Convulsions (H)     ,etoh WD     Osteoarthritis     hands, back, hips     Other psychoactive substance dependence, uncomplicated (H)     etoh, tob     Superficial frostbite     30 yr ago     Past Surgical History:   Procedure Laterality Date     OTHER SURGICAL HISTORY      ,,OTHER       Anesthesia Evaluation     . Pt has had prior anesthetic.     No history of anesthetic complications          ROS/MED HX    ENT/Pulmonary:     (+)tobacco use, .25 packs/day  , . .    Neurologic:     (+)neuropathy - Below bilateral knees,     Cardiovascular:  - neg cardiovascular ROS       METS/Exercise Tolerance:  4 - Raking leaves, gardening   Hematologic:  - neg hematologic  ROS       Musculoskeletal:   (+) arthritis,  -       GI/Hepatic:  - neg GI/hepatic ROS       Renal/Genitourinary:  - ROS Renal section negative       Endo:  - neg endo ROS       Psychiatric: Comment: ETOH abuse        Infectious Disease:  - neg infectious disease ROS       Malignancy:      - no malignancy   Other:    - neg other ROS                      Physical Exam  Normal systems: cardiovascular and pulmonary    Airway   Mallampati: II  TM distance: >3 FB  Neck ROM: full    Dental   (+) chipped    Cardiovascular   Rhythm and rate: regular and normal      Pulmonary             Lab Results   Component Value Date    WBC 5.4 2019    HGB 9.5 (L) 2019    HCT 32.1 (L) 2019     2019     2019    POTASSIUM 3.7 2019    CHLORIDE 103 2019    CO2 29 2019    BUN 2 (L) 2019    CR 0.51 (L) 2019    GLC 95 2019    LANI 8.0 (L) 2019    PHOS 3.3 2019    MAG 1.9 2019    ALBUMIN 3.4 (L) 2019    PROTTOTAL 5.6 (L)  "07/05/2019    ALT 13 07/05/2019    AST 23 07/05/2019    ALKPHOS 33 (L) 07/05/2019    BILITOTAL 0.7 07/05/2019    BILIDIRECT 0.42 (H) 07/21/2016    TAMMY 30 07/21/2016    INR 1.02 07/04/2019    TSH 2.35 04/08/2010       Preop Vitals  BP Readings from Last 3 Encounters:   07/08/19 112/71   07/26/18 114/80   04/27/17 108/72    Pulse Readings from Last 3 Encounters:   07/08/19 110   07/26/18 88   04/27/17 118      Resp Readings from Last 3 Encounters:   07/08/19 16    SpO2 Readings from Last 3 Encounters:   07/08/19 99%   07/21/16 94%      Temp Readings from Last 1 Encounters:   07/08/19 97  F (36.1  C) (Tympanic)    Ht Readings from Last 1 Encounters:   07/04/19 1.626 m (5' 4\")      Wt Readings from Last 1 Encounters:   07/07/19 62.4 kg (137 lb 9.6 oz)    Estimated body mass index is 23.62 kg/m  as calculated from the following:    Height as of this encounter: 1.626 m (5' 4\").    Weight as of this encounter: 62.4 kg (137 lb 9.6 oz).       Anesthesia Plan      History & Physical Review      ASA Status:  2 .    NPO Status:  > 8 hours    Plan for MAC with Intravenous induction. Maintenance will be Balanced.      Risks, benefits and alternatives discussed and would like to proceed.         Postoperative Care      Consents  Anesthetic plan, risks, benefits and alternatives discussed with:  Patient.  Use of blood products discussed: No .   .                 KAELA Haile CRNA  "

## 2019-07-08 NOTE — PROGRESS NOTES
"Prasanna Alonso 64 year old male   Admission date: 7/4/2019   Code status: Full Code   Isolation: No active isolations   Principal Problem:GI bleed   Diet: clear liquids  Mobility Status: IND  Discharge timeline & plan: likely on 7/8/19   Vital signs:  Temp: 98.4  F (36.9  C) Temp src: Tympanic BP: 104/56 Pulse: 52 Heart Rate: 86 Resp: 16 SpO2: 96 % O2 Device: None (Room air)   Height: 162.6 cm (5' 4\") Weight: 62.4 kg (137 lb 9.6 oz)    Abnormal Physical Assessment: Pt A&O, VSS, afebrile. Continuing bowel prep, pt up to bathroom often. Pt drank 2 glasses of bowel prep at 0630, should be NPO at least 2hr before EGD and colonoscopy per informational booklet. Pt up to bathroom several times this shift, stools are loose and clear/ light yellow.  IV potassium protocol completed at 0600, Lab scheduled for 0800. Magnesium currently being replaced.     Last Pain/PRN Medication given: None  Abnormal labs: Electrolytes have been low, particularly potassium. Hgb 9.5  Telemetry: No  Pending tests/procedures planned: Colonoscopy and endoscopy at 1100 on 7/8.     SAFETY CHECKLIST  Arm Bands/signs/magnets (DNR, Fall risk, Allergy, Latex, Limb, etc.) in place:YES  IVF/Medications/rate ordered infusing (do they match the order?):YES  Physical assessment (wounds, incisions, dopplers, LDA's, neuro, CIWA, etc.)YES   Environmental assessment (bed/chair alarm on, call light, side rails, restraints, etc.):{YES   Whiteboard updated:YES            "

## 2019-07-08 NOTE — PLAN OF CARE
Problem: Adult Inpatient Plan of Care  Goal: Plan of Care Review  Note:   Pt A&O, VSS, afebrile. Ambulating in room independently. Pt continuing bowel prep for EGD and colonoscopy. Pt up to bathroom several times this shift, stools are loose and clear/ light yellow. Pt resting well between cares. IV infusing, call light within reach. Will continue to monitor.    Blanka Ames RN on 7/8/2019 at 2:16 AM

## 2019-07-08 NOTE — ANESTHESIA CARE TRANSFER NOTE
Patient: Prasanna Alonso    Procedure(s):  ESOPHAGOGASTRODUODENOSCOPY, WITH BIOPSY  COLONOSCOPY, WITH POLYPECTOMY AND BIOPSY    Diagnosis: GI BLEEDING  Diagnosis Additional Information: No value filed.    Anesthesia Type:   MAC     Note:  Airway :Nasal Cannula  Patient transferred to:PACU  Handoff Report: Identifed the Patient, Identified the Reponsible Provider, Reviewed the pertinent medical history, Discussed the surgical course, Reviewed Intra-OP anesthesia mangement and issues during anesthesia, Set expectations for post-procedure period and Allowed opportunity for questions and acknowledgement of understanding      Vitals: (Last set prior to Anesthesia Care Transfer)    CRNA VITALS  7/8/2019 1207 - 7/8/2019 1241      7/8/2019             Resp Rate (set):  10                Electronically Signed By: KAELA ROCA CRNA  July 8, 2019  12:41 PM

## 2019-07-08 NOTE — PROGRESS NOTES
Patient transferred back to M/S/P from pacu. Report received from Afia BARFIELD. Patient has stable VS and is alert and awake at time of returning to floor.     KWAME JESUS RN on 7/8/2019 at 3:39 PM

## 2019-07-08 NOTE — INTERVAL H&P NOTE
I saw and examined Prasanna KRYSTIAN Castanedaon.  I have reviewed the history and physical and find no changes to the patient's medical status or condition with the exceptions noted below.     Adalberto Cortes   11:45 AM 7/8/2019

## 2019-07-08 NOTE — OR NURSING
PACU Transfer Note    Prasanna Alonso was transferred to DSU via cart.  Equipment used for transport:  None.  Accompanied by:  La Nena VOGT RN.  Prescriptions were: None    PACU Respiratory Event Documentation     1) Episodes of Apnea greater than or equal to 10 seconds: No    2) Bradypnea - less than 8 breaths per minute: No    3) Pain score on 0 to 10 scale: 0    4) Pain-sedation mismatch (yes or no): No    5) Repeated 02 desaturation less than 90% (yes or no): No    Anesthesia notified? (yes or no): No    Any of the above events occuring repeatedly in separate 30 minute intervals may be considered recurrent PACU respiratory events.    Patient stable and meets phase 1 discharge criteria for transport from PACU.

## 2019-07-17 ENCOUNTER — OFFICE VISIT (OUTPATIENT)
Dept: FAMILY MEDICINE | Facility: OTHER | Age: 64
End: 2019-07-17
Attending: FAMILY MEDICINE
Payer: COMMERCIAL

## 2019-07-17 VITALS
HEART RATE: 72 BPM | BODY MASS INDEX: 23.03 KG/M2 | SYSTOLIC BLOOD PRESSURE: 92 MMHG | HEIGHT: 65 IN | WEIGHT: 138.2 LBS | TEMPERATURE: 97.3 F | RESPIRATION RATE: 20 BRPM | DIASTOLIC BLOOD PRESSURE: 60 MMHG

## 2019-07-17 DIAGNOSIS — K92.2 GASTROINTESTINAL HEMORRHAGE, UNSPECIFIED GASTROINTESTINAL HEMORRHAGE TYPE: Primary | ICD-10-CM

## 2019-07-17 LAB — HGB BLD-MCNC: 11.2 G/DL (ref 13.3–17.7)

## 2019-07-17 PROCEDURE — 99214 OFFICE O/P EST MOD 30 MIN: CPT | Performed by: FAMILY MEDICINE

## 2019-07-17 PROCEDURE — 85018 HEMOGLOBIN: CPT | Mod: ZL | Performed by: FAMILY MEDICINE

## 2019-07-17 PROCEDURE — 36415 COLL VENOUS BLD VENIPUNCTURE: CPT | Mod: ZL | Performed by: FAMILY MEDICINE

## 2019-07-17 PROCEDURE — G0463 HOSPITAL OUTPT CLINIC VISIT: HCPCS

## 2019-07-17 ASSESSMENT — ANXIETY QUESTIONNAIRES
6. BECOMING EASILY ANNOYED OR IRRITABLE: NOT AT ALL
2. NOT BEING ABLE TO STOP OR CONTROL WORRYING: NOT AT ALL
1. FEELING NERVOUS, ANXIOUS, OR ON EDGE: NOT AT ALL
7. FEELING AFRAID AS IF SOMETHING AWFUL MIGHT HAPPEN: NOT AT ALL
5. BEING SO RESTLESS THAT IT IS HARD TO SIT STILL: NOT AT ALL
3. WORRYING TOO MUCH ABOUT DIFFERENT THINGS: NOT AT ALL
GAD7 TOTAL SCORE: 0

## 2019-07-17 ASSESSMENT — PATIENT HEALTH QUESTIONNAIRE - PHQ9
SUM OF ALL RESPONSES TO PHQ QUESTIONS 1-9: 0
5. POOR APPETITE OR OVEREATING: NOT AT ALL

## 2019-07-17 ASSESSMENT — MIFFLIN-ST. JEOR: SCORE: 1340.81

## 2019-07-17 ASSESSMENT — PAIN SCALES - GENERAL: PAINLEVEL: NO PAIN (0)

## 2019-07-17 NOTE — NURSING NOTE
Patient here for hospital follow up for GI bleed also needing a new PCP. No concerns today no further bleeding. Medication Reconciliation: complete.    Fanny Holliday LPN  7/17/2019 10:27 AM

## 2019-07-18 ASSESSMENT — ANXIETY QUESTIONNAIRES: GAD7 TOTAL SCORE: 0

## 2019-07-18 NOTE — PROGRESS NOTES
SUBJECTIVE:   Prasanna Alonso is a 64 year old male who presents to clinic today for the following health issues: Hospital follow-up    Patient arrives here for hospital follow-up.  Recently was found to have blood per rectum and patient did undergo a endoscopy and colonoscopy.  He reports that they were unremarkable.  Patient has a long history of alcohol abuse.  And reports he binge drinking quite heavily prior to episode of bleeding.  Patient states he had 1 or 2 episodes and none since.  He was hospitalized from July 4.  To July 8.  Currently does not have any complaints or concerns.        Patient Active Problem List    Diagnosis Date Noted     GI bleed 07/04/2019     Priority: Medium     Alcoholic peripheral neuropathy (H) 10/20/2015     Priority: Medium     Tobacco use disorder 09/17/2012     Priority: Medium     Degenerative arthritis of hip 08/27/2012     Priority: Medium     Backache 02/28/2012     Priority: Medium     Alcohol abuse 06/13/2011     Priority: Medium     Overview:   chemical dependency       Osteoarthrosis, hand 06/13/2011     Priority: Medium     Past Medical History:   Diagnosis Date     Convulsions (H)     2010,etoh WD     Osteoarthritis     hands, back, hips     Other psychoactive substance dependence, uncomplicated (H)     etoh, tob     Superficial frostbite     30 yr ago      Social History     Tobacco Use     Smoking status: Current Every Day Smoker     Packs/day: 1.00     Years: 40.00     Pack years: 40.00     Types: Cigarettes     Smokeless tobacco: Never Used   Substance Use Topics     Alcohol use: Yes     Alcohol/week: 3.0 - 3.6 oz     Comment: Alcoholic Drinks/day: 1 liter per week 1 beer      Social History     Social History Narrative    Unemployed.  Lives with his mother here in [place].  Has brother and [place] and 5 sisters in the [facility].  Smoker.  Prior heavy drinker and went through treatment.  Now drinking only about a single 1.75 L bottle of vodka a  "week.     Current Outpatient Medications   Medication Sig Dispense Refill     gabapentin (NEURONTIN) 300 MG capsule Take 1 capsule (300 mg) by mouth 4 times daily 120 capsule 11     multivitamin w/minerals (THERA-VIT-M) tablet Take 1 tablet by mouth daily 100 tablet 0     pantoprazole (PROTONIX) 40 MG EC tablet Take 1 tablet (40 mg) by mouth 2 times daily (before meals) 60 tablet 0     No Known Allergies    Review of Systems     OBJECTIVE:     BP 92/60   Pulse 72   Temp 97.3  F (36.3  C)   Resp 20   Ht 1.638 m (5' 4.5\")   Wt 62.7 kg (138 lb 3.2 oz)   BMI 23.36 kg/m    Body mass index is 23.36 kg/m .  Physical Exam   Constitutional: He appears well-developed and well-nourished.   HENT:   Head: Normocephalic and atraumatic.   Cardiovascular: Normal rate and regular rhythm.   Pulmonary/Chest: Effort normal and breath sounds normal.   Abdominal: Soft. He exhibits no distension and no mass. There is no tenderness. There is no rebound and no guarding.   Neurological: He is alert.       Diagnostic Test Results:  Results for orders placed or performed in visit on 07/17/19   Hemoglobin   Result Value Ref Range    Hemoglobin 11.2 (L) 13.3 - 17.7 g/dL       ASSESSMENT/PLAN:         1. Gastrointestinal hemorrhage, unspecified gastrointestinal hemorrhage type  Discharge summary reviewed.  No apparent source of his GI bleed.  I highly suspect possibly gastritis.  Also could be diverticulitis.  We had a lengthy discussion about avoiding alcohol.  He was only mildly receptive to this.   H. pylori results reviewed and were negative.  If symptoms persist especially if he stops his alcohol may need follow-up with a small bowel follow-through camera  - Hemoglobin; Future  - Hemoglobin      Derrell Liu MD  [facility]  "

## 2019-07-30 DIAGNOSIS — G62.1 ALCOHOLIC PERIPHERAL NEUROPATHY (H): Primary | ICD-10-CM

## 2019-08-02 RX ORDER — GABAPENTIN 300 MG/1
300 CAPSULE ORAL 4 TIMES DAILY
Qty: 120 CAPSULE | OUTPATIENT
Start: 2019-08-02

## 2019-08-02 NOTE — TELEPHONE ENCOUNTER
[facility] #[id number] sent Rx request for the following:      gabapentin (NEURONTIN) 300 MG capsule  Sig: Take 1 capsule (300 mg) by mouth 4 times daily  Last Prescription Date:   7/26/18  Last Fill Qty/Refills:         120, R-11 ([facility])    Last Office Visit:              7/17/19  Future Office visit:             Next 5 appointments (look out 90 days)    Aug 02, 2019 11:30 AM CDT  SHORT with Derrell Liu MD  [facility] [address]  [place] [address]  [phone]        Routing refill request to provider for review/approval because:  Drug not on the [facility], [facility] or  Health refill protocol or controlled substance    PT HAS APPOINTMENT TODAY.     Gerri Johnson RN .............. 8/2/2019  9:04 AM

## 2020-06-09 ENCOUNTER — OFFICE VISIT (OUTPATIENT)
Dept: PEDIATRICS | Facility: OTHER | Age: 65
End: 2020-06-09
Attending: INTERNAL MEDICINE
Payer: COMMERCIAL

## 2020-06-09 VITALS
RESPIRATION RATE: 16 BRPM | SYSTOLIC BLOOD PRESSURE: 116 MMHG | TEMPERATURE: 97.6 F | HEART RATE: 96 BPM | OXYGEN SATURATION: 98 % | HEIGHT: 65 IN | BODY MASS INDEX: 25.16 KG/M2 | WEIGHT: 151 LBS | DIASTOLIC BLOOD PRESSURE: 66 MMHG

## 2020-06-09 DIAGNOSIS — M15.0 PRIMARY OSTEOARTHRITIS INVOLVING MULTIPLE JOINTS: ICD-10-CM

## 2020-06-09 DIAGNOSIS — Z11.59 NEED FOR HEPATITIS C SCREENING TEST: ICD-10-CM

## 2020-06-09 DIAGNOSIS — Z11.4 ENCOUNTER FOR SCREENING FOR HIV: ICD-10-CM

## 2020-06-09 DIAGNOSIS — Z12.5 SPECIAL SCREENING FOR MALIGNANT NEOPLASM OF PROSTATE: ICD-10-CM

## 2020-06-09 DIAGNOSIS — Z13.6 ENCOUNTER FOR ABDOMINAL AORTIC ANEURYSM (AAA) SCREENING: ICD-10-CM

## 2020-06-09 DIAGNOSIS — G62.1 ALCOHOLIC PERIPHERAL NEUROPATHY (H): ICD-10-CM

## 2020-06-09 DIAGNOSIS — Z13.1 SCREENING FOR DIABETES MELLITUS: ICD-10-CM

## 2020-06-09 DIAGNOSIS — L72.0 EPIDERMOID CYST OF FACE: ICD-10-CM

## 2020-06-09 DIAGNOSIS — Z87.891 PERSONAL HISTORY OF NICOTINE DEPENDENCE: ICD-10-CM

## 2020-06-09 DIAGNOSIS — Z13.220 LIPID SCREENING: ICD-10-CM

## 2020-06-09 DIAGNOSIS — D64.9 ANEMIA, UNSPECIFIED TYPE: ICD-10-CM

## 2020-06-09 DIAGNOSIS — Z00.00 ANNUAL PHYSICAL EXAM: Primary | ICD-10-CM

## 2020-06-09 DIAGNOSIS — K76.9 LIVER DISEASE, UNSPECIFIED: ICD-10-CM

## 2020-06-09 DIAGNOSIS — Z76.89 ENCOUNTER TO ESTABLISH CARE: ICD-10-CM

## 2020-06-09 DIAGNOSIS — F17.200 TOBACCO USE DISORDER: ICD-10-CM

## 2020-06-09 DIAGNOSIS — Z87.891 PERSONAL HISTORY OF TOBACCO USE: ICD-10-CM

## 2020-06-09 PROBLEM — G60.9 IDIOPATHIC PERIPHERAL NEUROPATHY: Status: RESOLVED | Noted: 2020-06-09 | Resolved: 2020-06-09

## 2020-06-09 PROBLEM — G60.9 IDIOPATHIC PERIPHERAL NEUROPATHY: Status: ACTIVE | Noted: 2020-06-09

## 2020-06-09 LAB
ALBUMIN SERPL-MCNC: 4.1 G/DL (ref 3.5–5.7)
ALP SERPL-CCNC: 69 U/L (ref 34–104)
ALT SERPL W P-5'-P-CCNC: 33 U/L (ref 7–52)
ANION GAP SERPL CALCULATED.3IONS-SCNC: 9 MMOL/L (ref 3–14)
AST SERPL W P-5'-P-CCNC: 44 U/L (ref 13–39)
BASOPHILS # BLD AUTO: 0.1 10E9/L (ref 0–0.2)
BASOPHILS NFR BLD AUTO: 1 %
BILIRUB SERPL-MCNC: 0.4 MG/DL (ref 0.3–1)
BUN SERPL-MCNC: 15 MG/DL (ref 7–25)
CALCIUM SERPL-MCNC: 9.4 MG/DL (ref 8.6–10.3)
CHLORIDE SERPL-SCNC: 104 MMOL/L (ref 98–107)
CHOLEST SERPL-MCNC: 150 MG/DL
CO2 SERPL-SCNC: 26 MMOL/L (ref 21–31)
CREAT SERPL-MCNC: 0.73 MG/DL (ref 0.7–1.3)
DEPRECATED CALCIDIOL+CALCIFEROL SERPL-MC: 34.2 NG/ML
DIFFERENTIAL METHOD BLD: ABNORMAL
EOSINOPHIL # BLD AUTO: 0.3 10E9/L (ref 0–0.7)
EOSINOPHIL NFR BLD AUTO: 3.6 %
ERYTHROCYTE [DISTWIDTH] IN BLOOD BY AUTOMATED COUNT: 15.2 % (ref 10–15)
GFR SERPL CREATININE-BSD FRML MDRD: >90 ML/MIN/{1.73_M2}
GLUCOSE SERPL-MCNC: 122 MG/DL (ref 70–105)
HCT VFR BLD AUTO: 43.1 % (ref 40–53)
HDLC SERPL-MCNC: 65 MG/DL (ref 23–92)
HGB BLD-MCNC: 14.1 G/DL (ref 13.3–17.7)
IMM GRANULOCYTES # BLD: 0 10E9/L (ref 0–0.4)
IMM GRANULOCYTES NFR BLD: 0.3 %
IRON SATN MFR SERPL: 10 % (ref 20–55)
IRON SERPL-MCNC: 43 UG/DL (ref 50–212)
LDLC SERPL CALC-MCNC: 71 MG/DL
LYMPHOCYTES # BLD AUTO: 1.8 10E9/L (ref 0.8–5.3)
LYMPHOCYTES NFR BLD AUTO: 26.1 %
MCH RBC QN AUTO: 31.3 PG (ref 26.5–33)
MCHC RBC AUTO-ENTMCNC: 32.7 G/DL (ref 31.5–36.5)
MCV RBC AUTO: 96 FL (ref 78–100)
MONOCYTES # BLD AUTO: 0.7 10E9/L (ref 0–1.3)
MONOCYTES NFR BLD AUTO: 9.7 %
NEUTROPHILS # BLD AUTO: 4.1 10E9/L (ref 1.6–8.3)
NEUTROPHILS NFR BLD AUTO: 59.3 %
NONHDLC SERPL-MCNC: 85 MG/DL
PLATELET # BLD AUTO: 266 10E9/L (ref 150–450)
POTASSIUM SERPL-SCNC: 3.8 MMOL/L (ref 3.5–5.1)
PROT SERPL-MCNC: 7.4 G/DL (ref 6.4–8.9)
PSA SERPL-ACNC: 2.15 NG/ML
RBC # BLD AUTO: 4.51 10E12/L (ref 4.4–5.9)
SODIUM SERPL-SCNC: 139 MMOL/L (ref 134–144)
TIBC SERPL-MCNC: 417.2 UG/DL (ref 245–400)
TRIGL SERPL-MCNC: 70 MG/DL
UIBC (UNSATURATED): 374.2 MG/DL
VIT B12 SERPL-MCNC: 262 PG/ML (ref 180–914)
WBC # BLD AUTO: 6.9 10E9/L (ref 4–11)

## 2020-06-09 PROCEDURE — 99396 PREV VISIT EST AGE 40-64: CPT | Mod: 25 | Performed by: INTERNAL MEDICINE

## 2020-06-09 PROCEDURE — 82306 VITAMIN D 25 HYDROXY: CPT | Mod: ZL | Performed by: INTERNAL MEDICINE

## 2020-06-09 PROCEDURE — 83540 ASSAY OF IRON: CPT | Mod: ZL | Performed by: INTERNAL MEDICINE

## 2020-06-09 PROCEDURE — 85025 COMPLETE CBC W/AUTO DIFF WBC: CPT | Mod: ZL | Performed by: INTERNAL MEDICINE

## 2020-06-09 PROCEDURE — G0463 HOSPITAL OUTPT CLINIC VISIT: HCPCS

## 2020-06-09 PROCEDURE — 83550 IRON BINDING TEST: CPT | Mod: ZL | Performed by: INTERNAL MEDICINE

## 2020-06-09 PROCEDURE — G0103 PSA SCREENING: HCPCS | Mod: ZL | Performed by: INTERNAL MEDICINE

## 2020-06-09 PROCEDURE — 90732 PPSV23 VACC 2 YRS+ SUBQ/IM: CPT

## 2020-06-09 PROCEDURE — G0009 ADMIN PNEUMOCOCCAL VACCINE: HCPCS

## 2020-06-09 PROCEDURE — G0472 HEP C SCREEN HIGH RISK/OTHER: HCPCS | Mod: ZL | Performed by: INTERNAL MEDICINE

## 2020-06-09 PROCEDURE — 87389 HIV-1 AG W/HIV-1&-2 AB AG IA: CPT | Mod: ZL | Performed by: INTERNAL MEDICINE

## 2020-06-09 PROCEDURE — 82607 VITAMIN B-12: CPT | Mod: ZL | Performed by: INTERNAL MEDICINE

## 2020-06-09 PROCEDURE — 80053 COMPREHEN METABOLIC PANEL: CPT | Mod: ZL | Performed by: INTERNAL MEDICINE

## 2020-06-09 PROCEDURE — 36415 COLL VENOUS BLD VENIPUNCTURE: CPT | Mod: ZL | Performed by: INTERNAL MEDICINE

## 2020-06-09 PROCEDURE — 80061 LIPID PANEL: CPT | Mod: ZL | Performed by: INTERNAL MEDICINE

## 2020-06-09 PROCEDURE — G0296 VISIT TO DETERM LDCT ELIG: HCPCS | Performed by: INTERNAL MEDICINE

## 2020-06-09 RX ORDER — AMOXICILLIN 500 MG/1
500 CAPSULE ORAL 3 TIMES DAILY
COMMUNITY
Start: 2020-05-26

## 2020-06-09 ASSESSMENT — PAIN SCALES - GENERAL: PAINLEVEL: NO PAIN (0)

## 2020-06-09 ASSESSMENT — MIFFLIN-ST. JEOR: SCORE: 1407.42

## 2020-06-09 NOTE — LETTER
Suzie 15, 2020      Prasanna Alonso  [address] [facility]   [facility] [address]        Dear ,    We are writing to inform you of your test results.    Blood work looks good.  Iron level a little low, but you are not anemic.  Eat some iron containing foods like spinach, avocado, red meat.  You do not have HIV or Hep C.    Signed, Ariel Vincent MD, FAAP, FACP  Internal Medicine & Pediatrics      Resulted Orders   HIV Antigen Antibody Combo   Result Value Ref Range    HIV Antigen Antibody Combo Nonreactive NR^Nonreactive          Comment:      HIV-1 p24 Ag & HIV-1/HIV-2 Ab Not Detected   Comprehensive metabolic panel   Result Value Ref Range    Sodium 139 134 - 144 mmol/L    Potassium 3.8 3.5 - 5.1 mmol/L    Chloride 104 98 - 107 mmol/L    Carbon Dioxide 26 21 - 31 mmol/L    Anion Gap 9 3 - 14 mmol/L    Glucose 122 (H) 70 - 105 mg/dL    Urea Nitrogen 15 7 - 25 mg/dL    Creatinine 0.73 0.70 - 1.30 mg/dL    GFR Estimate >90 >60 mL/min/[1.73_m2]    GFR Estimate If Black >90 >60 mL/min/[1.73_m2]    Calcium 9.4 8.6 - 10.3 mg/dL    Bilirubin Total 0.4 0.3 - 1.0 mg/dL    Albumin 4.1 3.5 - 5.7 g/dL    Protein Total 7.4 6.4 - 8.9 g/dL    Alkaline Phosphatase 69 34 - 104 U/L    ALT 33 7 - 52 U/L    AST 44 (H) 13 - 39 U/L   PSA Screen GH   Result Value Ref Range    PSA Screen 2.154 <3.100 ng/mL      Comment:      The DXI Access PSAS WHO assay is a two site immunoenzymatic assay. Assay   values obtained with different assay methods cannot be used interchangeably   due to differences in assay methods and reagent specificity.     Lipid Profile   Result Value Ref Range    Cholesterol 150 <200 mg/dL    Triglycerides 70 <150 mg/dL    HDL Cholesterol 65 23 - 92 mg/dL    LDL Cholesterol Calculated 71 <100 mg/dL      Comment:      Desirable:       <100 mg/dl    Non HDL Cholesterol 85 <130 mg/dL   Iron Binding Panel GH   Result Value Ref Range    Iron 43 (L) 50 - 212 ug/dL    UIBC (Unsaturated) 374.20 mg/dL    Iron  Binding Capacity 417.20 (H) 245.00 - 400.00 ug/dL    Iron Saturation 10 (L) 20 - 55 %   Vitamin D Total GH   Result Value Ref Range    Vitamin D Total 34.2 ng/mL      Comment:      Comments:  Deficiency:             <10 ng/mL  Insufficiency:        10-29 ng/mL  Sufficiency:          ng/mL  Possible Toxicity:     >100 ng/mL     Vitamin B12   Result Value Ref Range    Vitamin B12 262 180 - 914 pg/mL   CBC with platelets differential   Result Value Ref Range    WBC 6.9 4.0 - 11.0 10e9/L    RBC Count 4.51 4.4 - 5.9 10e12/L    Hemoglobin 14.1 13.3 - 17.7 g/dL    Hematocrit 43.1 40.0 - 53.0 %    MCV 96 78 - 100 fl    MCH 31.3 26.5 - 33.0 pg    MCHC 32.7 31.5 - 36.5 g/dL    RDW 15.2 (H) 10.0 - 15.0 %    Platelet Count 266 150 - 450 10e9/L    Diff Method Automated Method     % Neutrophils 59.3 %    % Lymphocytes 26.1 %    % Monocytes 9.7 %    % Eosinophils 3.6 %    % Basophils 1.0 %    % Immature Granulocytes 0.3 %    Absolute Neutrophil 4.1 1.6 - 8.3 10e9/L    Absolute Lymphocytes 1.8 0.8 - 5.3 10e9/L    Absolute Monocytes 0.7 0.0 - 1.3 10e9/L    Absolute Eosinophils 0.3 0.0 - 0.7 10e9/L    Absolute Basophils 0.1 0.0 - 0.2 10e9/L    Abs Immature Granulocytes 0.0 0 - 0.4 10e9/L   Hepatitis C Screen Reflex to HCV RNA Quant and Genotype   Result Value Ref Range    Hepatitis C Antibody Nonreactive NR^Nonreactive      Comment:      Assay performance characteristics have not been established for newborns,   infants, and children         If you have any questions or concerns, please call the clinic at the number listed above.       Sincerely,        Ariel Vincent MD

## 2020-06-09 NOTE — PATIENT INSTRUCTIONS
-- Pneumonia shot today   -- Get the new shingles vaccine series from a nurse-only visit, pharmacy or [facility] ([facility]).   -- Lab work today   -- You should quit smoking   -- General Surgery consult for cyst on right cheek  Lung Cancer Screening   Frequently Asked Questions  If you are at high-risk for lung cancer, getting screened with low-dose computed tomography (LDCT) every year can help save your life. This handout offers answers to some of the most common questions about lung cancer screening. If you have other questions, please call [phone] ([phone]).     What is it?  Lung cancer screening uses special X-ray technology to create an image of your lung tissue. The exam is quick and easy and takes less than 10 seconds. We don t give you any medicine or use any needles. You can eat before and after the exam. You don t need to change your clothes as long as the clothing on your chest doesn t contain metal. But, you do need to be able to hold your breath for at least 6 seconds during the exam.    What is the goal of lung cancer screening?  The goal of lung cancer screening is to save lives. Many times, lung cancer is not found until a person starts having physical symptoms. Lung cancer screening can help detect lung cancer in the earliest stages when it may be easier to treat.    Who should be screened for lung cancer?  We suggest lung cancer screening for anyone who is at high-risk for lung cancer. You are in the high-risk group if you:      are between the ages of 55 and 79, and    have smoked at least 1 pack of cigarettes a day for 30 or more years, and    still smoke or have quit within the past 15 years.    However, if you have a new cough or shortness of breath, you should talk to your doctor before being screened.    Some national lung health advocacy groups also recommend screening for people ages 50 to 79 who have smoked an average of 1 pack of cigarettes a day for 20  years. They must also have at least 1 other risk factor for lung cancer, not including exposure to secondhand smoke. Other risk factors are having had cancer in the past, emphysema, pulmonary fibrosis, COPD, a family history of lung cancer, or exposure to certain materials such as arsenic, asbestos, beryllium, cadmium, chromium, diesel fumes, nickel, radon or silica. Your care team can help you know if you have one of these risk factors.     Why does it matter if I have symptoms?  Certain symptoms can be a sign that you have a condition in your lungs that should be checked and treated by your doctor. These symptoms include fever, chest pain, a new or changing cough, shortness of breath that you have never felt before, coughing up blood or unexplained weight loss. Having any of these symptoms can greatly affect the results of lung cancer screening.       Should all smokers get an LDCT lung cancer screening exam?  It depends. Lung cancer screening is for a very specific group of men and women who have a history of heavy smoking over a long period of time (see  Who should be screened for lung cancer  above).  I am in the high-risk group, but have been diagnosed with cancer in the past. Is LDCT lung cancer screening right for me?  In some cases, you should not have LDCT lung screening, such as when your doctor is already following your cancer with CT scan studies. Your doctor will help you decide if LDCT lung screening is right for you.  Do I need to have a screening exam every year?  Yes. If you are in the high-risk group described earlier, you should get an LDCT lung cancer screening exam every year until you are 79, or are no longer willing or able to undergo screening and possible procedures to diagnose and treat lung cancer.  How effective is LDCT at preventing death from lung cancer?  Studies have shown that LDCT lung cancer screening can lower the risk of death from lung cancer by 20 percent in people who are at  high-risk.  What are the risks?  There are some risks and limitations of LDCT lung cancer screening. We want to make sure you understand the risks and benefits, so please let us know if you have any questions. Your doctor may want to talk with you more about these risks.    Radiation exposure: As with any exam that uses radiation, there is a very small increased risk of cancer. The amount of radiation in LDCT is small--about the same amount a person would get from a mammogram. Your doctor orders the exam when he or she feels the potential benefits outweigh the risks.    False negatives: No test is perfect, including LDCT. It is possible that you may have a medical condition, including lung cancer, that is not found during your exam. This is called a false negative result.    False positives and more testing: LDCT very often finds something in the lung that could be cancer, but in fact is not. This is called a false positive result. False positive tests often cause anxiety. To make sure these findings are not cancer, you may need to have more tests. These tests will be done only if you give us permission. Sometimes patients need a treatment that can have side effects, such as a biopsy. For more information on false positives, see  What can I expect from the results?     Findings not related to lung cancer: Your LDCT exam also takes pictures of areas of your body next to your lungs. In a very small number of cases, the CT scan will show an abnormal finding in one of these areas, such as your kidneys, adrenal glands, liver or thyroid. This finding may not be serious, but you may need more tests. Your doctor can help you decide what other tests you may need, if any.  What can I expect from the results?  About 1 out of 4 LDCT exams will find something that may need more tests. Most of the time, these findings are lung nodules. Lung nodules are very small collections of tissue in the lung. These nodules are very common,  and the vast majority--more than 97 percent--are not cancer (benign). Most are normal lymph nodes or small areas of scarring from past infections.  But, if a small lung nodule is found to be cancer, the cancer can be cured more than 90 percent of the time. To know if the nodule is cancer, we may need to get more images before your next yearly screening exam. If the nodule has suspicious features (for example, it is large, has an odd shape or grows over time), we will refer you to a specialist for further testing.  Will my doctor also get the results?  Yes. Your doctor will get a copy of your results.  Is it okay to keep smoking now that there s a cancer screening exam?  No. Tobacco is one of the strongest cancer-causing agents. It causes not only lung cancer, but other cancers and cardiovascular (heart) diseases as well. The damage caused by smoking builds over time. This means that the longer you smoke, the higher your risk of disease. While it is never too late to quit, the sooner you quit, the better.  Where can I find help to quit smoking?  The best way to prevent lung cancer is to stop smoking. If you have already quit smoking, congratulations and keep it up! For help on quitting smoking, please call [employer] at 4-242-726-HNSA ([phone]) or the American Cancer Society at 1-361.447.3741 to find local resources near you.  One-on-one health coaching:  If you d prefer to work individually with a health care provider on tobacco cessation, we offer:      Medication Therapy Management:  Our specially trained pharmacists work closely with you and your doctor to help you quit smoking.  Call [phone] or [phone] (toll free).     Can Do: Health coaching offered by [facility].  [email or web address]

## 2020-06-09 NOTE — PROGRESS NOTES
Lung Cancer Screening Shared Decision Making Visit     Prasanna Alonso is eligible for lung cancer screening on the basis of the information provided in my signed lung cancer screening order.     I have discussed with patient the risks and benefits of screening for lung cancer with low-dose CT.     The risks include:  radiation exposure: one low dose chest CT has as much ionizing radiation as about 15 chest x-rays or 6 months of background radiation living in [place]    false positives: 96% of positive findings/nodules are NOT cancer, but some might still require additional diagnostic evaluation, including biopsy  over-diagnosis: some slow growing cancers that might never have been clinically significant will be detected and treated unnecessarily     The benefit of early detection of lung cancer is contingent upon adherence to annual screening or more frequent follow up if indicated.     Furthermore, reaping the benefits of screening requires Prasanna Alonso to be willing and physically able to undergo diagnostic procedures, if indicated. Although no specific guide is available for determining severity of comorbidities, it is reasonable to withhold screening in patients who have greater mortality risk from other diseases.     We did discuss that the only way to prevent lung cancer is to not smoke. Smoking cessation assistance was offered.    I did not offer risk estimation using a calculator such as this one:    ShouldIScreen

## 2020-06-09 NOTE — PROGRESS NOTES
Subjective  Prasanna Alonso is a 65 year old male who presents for annual physical, establish primary care.  Previous physician was Dr. Hopson.  He does not think he has any health problems.  He used to take gabapentin for neuropathy but stopped taking it.  He was prescribed that P medicine (Protonix) in the past and does not really know why.  He had intestinal bleeding with endoscopies about a year ago and they did not really find anything wrong.  He used to be a more heavy drinker but now he drinks 1-2 beers a day and some screwdrivers.  He smokes but is not yet ready to quit.  No chest pain with exertion.  He has a cyst on the right cheek of his face and he wants it removed.  He is usually limited by arthritis in the back and knees.    Problem List/PMH: reviewed in EMR, and made relevant updates today.  Medications: reviewed in EMR, and made relevant updates today.  Allergies: reviewed in EMR, and made relevant updates today.    Social Hx:  Social History     Tobacco Use     Smoking status: Current Every Day Smoker     Packs/day: 1.00     Years: 50.00     Pack years: 50.00     Types: Cigarettes     Smokeless tobacco: Never Used   Substance Use Topics     Alcohol use: Yes     Alcohol/week: 5.0 - 6.0 standard drinks     Comment: Alcoholic Drinks/day: 1-2 beer daily     Drug use: Never     Social History     Social History Narrative    Unemployed.  Lives with his mother here in [place].  Has brother and [place] and 5 sisters in the [facility].  Smoker.  Prior heavy drinker and went through treatment.  Now drinking only about a single 1.75 L bottle of vodka a week.        Never     No kids    Used to install automatic pool covers     I reviewed social history and made relevant updates today.    Family Hx:   Family History   Problem Relation Age of Onset     No Known Problems Other         No history of GI bleeding     No Known Problems Mother      Unknown/Adopted Father         disappeared when  "[place] was a kid     No Known Problems Sister      No Known Problems Brother        Objective  Vitals: reviewed in EMR.  /66 (BP Location: Right arm, Patient Position: Sitting, Cuff Size: Adult Regular)   Pulse 96   Temp 97.6  F (36.4  C) (Tympanic)   Resp 16   Ht 1.66 m (5' 5.35\")   Wt 68.5 kg (151 lb)   SpO2 98%   BMI 24.86 kg/m      Gen: Pleasant male, NAD.  HEENT: MMM, no OP erythema.   Neck: Supple, no JVD, no bruits.  CV: RRR no m/r/g.   Pulm: CTAB no w/r/r  GI: Soft, NT, ND. No HSM. No masses. Bowel sounds present.  Neuro: Grossly intact  Msk: No lower extremity edema.  Skin: No concerning lesions.  Psychiatric: Normal affect and insight. Does not appear anxious or depressed.    Labs:  Lab Results   Component Value Date    WBC 6.9 06/09/2020    HGB 14.1 06/09/2020    HCT 43.1 06/09/2020     06/09/2020    TRIG 91 04/27/2017    HDL 64 04/27/2017    ALT 13 07/05/2019    AST 23 07/05/2019     07/08/2019    BUN 2 (L) 07/08/2019    CO2 29 07/08/2019    TSH 2.35 04/08/2010    INR 1.02 07/04/2019       Glucose   Date Value Ref Range Status   07/08/2019 95 70 - 105 mg/dL Final   07/07/2019 123 (H) 70 - 105 mg/dL Final     Hemoglobin A1C   Date Value Ref Range Status   04/09/2010 5.4 4.3 - 6.0 % Final     Creatinine   Date Value Ref Range Status   07/08/2019 0.51 (L) 0.70 - 1.30 mg/dL Final   07/07/2019 0.37 (L) 0.70 - 1.30 mg/dL Final     LDL Cholesterol Calculated   Date Value Ref Range Status   04/27/2017 129 (H) <100 mg/dL      No results found for: [facility]            Assessment    ICD-10-CM    1. Annual physical exam  Z00.00    2. Need for hepatitis C screening test  Z11.59 Hepatitis C Screen Reflex to HCV RNA Quant and Genotype     Hepatitis C Screen Reflex to HCV RNA Quant and Genotype   3. Encounter for screening for HIV  Z11.4 HIV Antigen Antibody Combo   4. Epidermoid cyst of face  L72.0 GENERAL SURG ADULT REFERRAL   5. Primary osteoarthritis involving multiple joints  M15.0    6. 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 Encounter to establish care  Z76.89    8. Anemia, unspecified type  D64.9 CBC with platelets differential     Vitamin B12     Vitamin D Total GH     Iron Binding Panel GH     Iron Binding Panel GH     Vitamin D Total GH     Vitamin B12     CBC with platelets differential   9. Lipid screening  Z13.220 Lipid Profile     Lipid Profile   10. Special screening for malignant neoplasm of prostate  Z12.5 PSA Screen GH     PSA Screen GH   11. Screening for diabetes mellitus  Z13.1 Comprehensive metabolic panel     Comprehensive metabolic panel   12. Liver disease, unspecified   K76.9 Vitamin D Total GH     Vitamin D Total GH   13. Tobacco use disorder  F17.200 Prof fee: Shared Decisionmaking for Lung Cancer Screening     CT Chest Lung Cancer Scrn Low Dose wo     Okay for Smoking Cessation Study (PLUTO) to Contact Patient   14. Personal history of nicotine dependence   Z87.891 Prof fee: Shared Decisionmaking for Lung Cancer Screening     CT Chest Lung Cancer Scrn Low Dose wo   15. Personal history of tobacco use  Z87.891    16. Encounter for abdominal aortic aneurysm (AAA) screening  Z13.6 US Aorta Medicare AAA Screening     Orders Placed This Encounter   Procedures     Prof fee: Shared Decisionmaking for Lung Cancer Screening     Okay for Smoking Cessation Study (PLUTO) to Contact Patient     CT Chest Lung Cancer Scrn Low Dose wo     US Aorta Medicare AAA Screening     Pneumococcal vaccine 23 valent PPSV23  (Pneumovax) [77088]     Hepatitis C Screen Reflex to HCV RNA Quant and Genotype     HIV Antigen Antibody Combo     CBC with platelets differential     Vitamin B12     Vitamin D Total GH     Iron Binding Panel GH     Lipid Profile     PSA Screen GH     Comprehensive metabolic panel     GENERAL SURG ADULT REFERRAL       Electronic medical record is reviewed.  Screening and surveillance lab work and imaging is ordered as outlined above.  Pneumococcal vaccination recommended  today.  Colon cancer screening is up-to-date.    Plan   -- Expected clinical course discussed   -- Medications and their side effects discussed  Patient Instructions    -- Pneumonia shot today   -- Get the new shingles vaccine series from a nurse-only visit, pharmacy or [facility]   -- Lab work today   -- You should quit smoking   -- General Surgery consult for cyst on right cheek  Lung Cancer Screening   Frequently Asked Questions  If you are at high-risk for lung cancer, getting screened with low-dose computed tomography (LDCT) every year can help save your life. This handout offers answers to some of the most common questions about lung cancer screening. If you have other questions, please call [phone] ([phone]).     What is it?  Lung cancer screening uses special X-ray technology to create an image of your lung tissue. The exam is quick and easy and takes less than 10 seconds. We don t give you any medicine or use any needles. You can eat before and after the exam. You don t need to change your clothes as long as the clothing on your chest doesn t contain metal. But, you do need to be able to hold your breath for at least 6 seconds during the exam.    What is the goal of lung cancer screening?  The goal of lung cancer screening is to save lives. Many times, lung cancer is not found until a person starts having physical symptoms. Lung cancer screening can help detect lung cancer in the earliest stages when it may be easier to treat.    Who should be screened for lung cancer?  We suggest lung cancer screening for anyone who is at high-risk for lung cancer. You are in the high-risk group if you:      are between the ages of 55 and 79, and    have smoked at least 1 pack of cigarettes a day for 30 or more years, and    still smoke or have quit within the past 15 years.    However, if you have a new cough or shortness of breath, you should talk to your doctor before being  screened.    Some national lung health advocacy groups also recommend screening for people ages 50 to 79 who have smoked an average of 1 pack of cigarettes a day for 20 years. They must also have at least 1 other risk factor for lung cancer, not including exposure to secondhand smoke. Other risk factors are having had cancer in the past, emphysema, pulmonary fibrosis, COPD, a family history of lung cancer, or exposure to certain materials such as arsenic, asbestos, beryllium, cadmium, chromium, diesel fumes, nickel, radon or silica. Your care team can help you know if you have one of these risk factors.     Why does it matter if I have symptoms?  Certain symptoms can be a sign that you have a condition in your lungs that should be checked and treated by your doctor. These symptoms include fever, chest pain, a new or changing cough, shortness of breath that you have never felt before, coughing up blood or unexplained weight loss. Having any of these symptoms can greatly affect the results of lung cancer screening.       Should all smokers get an LDCT lung cancer screening exam?  It depends. Lung cancer screening is for a very specific group of men and women who have a history of heavy smoking over a long period of time (see  Who should be screened for lung cancer  above).  I am in the high-risk group, but have been diagnosed with cancer in the past. Is LDCT lung cancer screening right for me?  In some cases, you should not have LDCT lung screening, such as when your doctor is already following your cancer with CT scan studies. Your doctor will help you decide if LDCT lung screening is right for you.  Do I need to have a screening exam every year?  Yes. If you are in the high-risk group described earlier, you should get an LDCT lung cancer screening exam every year until you are 79, or are no longer willing or able to undergo screening and possible procedures to diagnose and treat lung cancer.  How effective is LDCT  at preventing death from lung cancer?  Studies have shown that LDCT lung cancer screening can lower the risk of death from lung cancer by 20 percent in people who are at high-risk.  What are the risks?  There are some risks and limitations of LDCT lung cancer screening. We want to make sure you understand the risks and benefits, so please let us know if you have any questions. Your doctor may want to talk with you more about these risks.    Radiation exposure: As with any exam that uses radiation, there is a very small increased risk of cancer. The amount of radiation in LDCT is small--about the same amount a person would get from a mammogram. Your doctor orders the exam when he or she feels the potential benefits outweigh the risks.    False negatives: No test is perfect, including LDCT. It is possible that you may have a medical condition, including lung cancer, that is not found during your exam. This is called a false negative result.    False positives and more testing: LDCT very often finds something in the lung that could be cancer, but in fact is not. This is called a false positive result. False positive tests often cause anxiety. To make sure these findings are not cancer, you may need to have more tests. These tests will be done only if you give us permission. Sometimes patients need a treatment that can have side effects, such as a biopsy. For more information on false positives, see  What can I expect from the results?     Findings not related to lung cancer: Your LDCT exam also takes pictures of areas of your body next to your lungs. In a very small number of cases, the CT scan will show an abnormal finding in one of these areas, such as your kidneys, adrenal glands, liver or thyroid. This finding may not be serious, but you may need more tests. Your doctor can help you decide what other tests you may need, if any.  What can I expect from the results?  About 1 out of 4 LDCT exams will find something  that may need more tests. Most of the time, these findings are lung nodules. Lung nodules are very small collections of tissue in the lung. These nodules are very common, and the vast majority--more than 97 percent--are not cancer (benign). Most are normal lymph nodes or small areas of scarring from past infections.  But, if a small lung nodule is found to be cancer, the cancer can be cured more than 90 percent of the time. To know if the nodule is cancer, we may need to get more images before your next yearly screening exam. If the nodule has suspicious features (for example, it is large, has an odd shape or grows over time), we will refer you to a specialist for further testing.  Will my doctor also get the results?  Yes. Your doctor will get a copy of your results.  Is it okay to keep smoking now that there s a cancer screening exam?  No. Tobacco is one of the strongest cancer-causing agents. It causes not only lung cancer, but other cancers and cardiovascular (heart) diseases as well. The damage caused by smoking builds over time. This means that the longer you smoke, the higher your risk of disease. While it is never too late to quit, the sooner you quit, the better.  Where can I find help to quit smoking?  The best way to prevent lung cancer is to stop smoking. If you have already quit smoking, congratulations and keep it up! For help on quitting smoking, please call [employer] at 6-835-857-YDPF ([phone]) or the American Cancer Society at 1-256.184.6831 to find local resources near you.  One-on-one health coaching:  If you d prefer to work individually with a health care provider on tobacco cessation, we offer:      Medication Therapy Management:  Our specially trained pharmacists work closely with you and your doctor to help you quit smoking.  Call [phone] or [phone] (toll free).     Can Do: Health coaching offered by [facility].  [email or web address]        Return in about 1 year  (around 6/9/2021) for medication management.    Signed, Ariel Vincent MD, FAAP, FACP  Internal Medicine & Pediatrics

## 2020-06-09 NOTE — NURSING NOTE
"Patient presents to the clinic today to establish care and for an annual exam.  Patient would also like a spot looked at on his cheek.   Neha Morgan LPN 6/9/2020   3:55 PM    Chief Complaint   Patient presents with     Establish Care     Annual Visit       Initial /66 (BP Location: Right arm, Patient Position: Sitting, Cuff Size: Adult Regular)   Pulse 96   Temp 97.6  F (36.4  C) (Tympanic)   Resp 16   Ht 1.66 m (5' 5.35\")   Wt 68.5 kg (151 lb)   SpO2 98%   BMI 24.86 kg/m   Estimated body mass index is 24.86 kg/m  as calculated from the following:    Height as of this encounter: 1.66 m (5' 5.35\").    Weight as of this encounter: 68.5 kg (151 lb).  Medication Reconciliation: complete  Neha Morgan LPN    "

## 2020-06-11 LAB
HCV AB SERPL QL IA: NONREACTIVE
HIV 1+2 AB+HIV1 P24 AG SERPL QL IA: NONREACTIVE

## 2020-06-23 ENCOUNTER — HOSPITAL ENCOUNTER (OUTPATIENT)
Dept: ULTRASOUND IMAGING | Facility: OTHER | Age: 65
End: 2020-06-23
Attending: INTERNAL MEDICINE
Payer: COMMERCIAL

## 2020-06-23 ENCOUNTER — HOSPITAL ENCOUNTER (OUTPATIENT)
Dept: CT IMAGING | Facility: OTHER | Age: 65
End: 2020-06-23
Attending: INTERNAL MEDICINE
Payer: COMMERCIAL

## 2020-06-23 DIAGNOSIS — Z13.6 ENCOUNTER FOR ABDOMINAL AORTIC ANEURYSM (AAA) SCREENING: ICD-10-CM

## 2020-06-23 DIAGNOSIS — F17.200 TOBACCO USE DISORDER: ICD-10-CM

## 2020-06-23 DIAGNOSIS — L81.4 LIVER SPOT: Primary | ICD-10-CM

## 2020-06-23 DIAGNOSIS — Z87.891 PERSONAL HISTORY OF NICOTINE DEPENDENCE: ICD-10-CM

## 2020-06-23 PROCEDURE — G0297 LDCT FOR LUNG CA SCREEN: HCPCS

## 2020-06-23 PROCEDURE — 76706 US ABDL AORTA SCREEN AAA: CPT

## 2020-06-24 ENCOUNTER — TELEPHONE (OUTPATIENT)
Dept: PEDIATRICS | Facility: OTHER | Age: 65
End: 2020-06-24

## 2020-06-24 NOTE — TELEPHONE ENCOUNTER
Addressed via telephone yesterday, see result note.    Signed, Ariel Vincent MD, FAAP, FACP  Internal Medicine & Pediatrics

## 2020-06-24 NOTE — TELEPHONE ENCOUNTER
"[facility] calling about abnormal results on chest CT patient had done yesterday. Per report: \"Significant Incidental Finding(s):  Category S: Yes.  a.  other (please specify): There is an indeterminant lesion within  the upper right lobe liver. Consider further evaluation with dynamic  contrast-enhanced CT imaging of the abdomen and pelvis.\"    Order pending for CT of abdomen and pelvis. Please add diagnosis and sign if you agree.     Susan Winn CMA on 6/24/2020 at 11:21 AM      "

## 2020-06-26 ENCOUNTER — OFFICE VISIT (OUTPATIENT)
Dept: SURGERY | Facility: OTHER | Age: 65
End: 2020-06-26
Attending: INTERNAL MEDICINE
Payer: COMMERCIAL

## 2020-06-26 VITALS
SYSTOLIC BLOOD PRESSURE: 148 MMHG | DIASTOLIC BLOOD PRESSURE: 76 MMHG | TEMPERATURE: 98 F | HEART RATE: 80 BPM | WEIGHT: 149 LBS | RESPIRATION RATE: 16 BRPM | BODY MASS INDEX: 24.53 KG/M2

## 2020-06-26 DIAGNOSIS — L72.0 EPIDERMOID CYST OF FACE: Primary | ICD-10-CM

## 2020-06-26 PROCEDURE — 11443 EXC FACE-MM B9+MARG 2.1-3 CM: CPT | Performed by: SURGERY

## 2020-06-26 PROCEDURE — 21011 EXC FACE LES SC <2 CM: CPT | Performed by: SURGERY

## 2020-06-26 PROCEDURE — G0463 HOSPITAL OUTPT CLINIC VISIT: HCPCS

## 2020-06-26 PROCEDURE — 13132 CMPLX RPR F/C/C/M/N/AX/G/H/F: CPT | Performed by: SURGERY

## 2020-06-26 ASSESSMENT — PAIN SCALES - GENERAL: PAINLEVEL: MODERATE PAIN (5)

## 2020-06-26 NOTE — NURSING NOTE
"Chief Complaint   Patient presents with     Procedure     Cyst on face       Initial BP (!) 148/76 (BP Location: Right arm, Patient Position: Sitting, Cuff Size: Adult Regular)   Pulse 80   Temp 98  F (36.7  C) (Tympanic)   Resp 16   Wt 67.6 kg (149 lb)   BMI 24.53 kg/m   Estimated body mass index is 24.53 kg/m  as calculated from the following:    Height as of 6/9/20: 1.66 m (5' 5.35\").    Weight as of this encounter: 67.6 kg (149 lb).  Medication Reconciliation: Completed     Ashlee Naqvi LPN  "

## 2020-06-26 NOTE — PROGRESS NOTES
Procedure Note     Pre/Post Operative Diagnosis:   Right cheek mass    Procedure:    Excision of Right cheek mass    Surgeon: ELVIRA Medrano MD     Local Anesthesia: 1% lidocaine with0.25%Marcaine with epinephrine    Indication for the procedure:    This is a 65 year old male patient with Right cheek mass. This has been present for many years, slowly getting bigger. No history of acute swelling, redness, pain or drainage. He feels it in his face and it is uncomfortable.  Clinically this is a ~3cm epidermal inclusion cyst. Less likely lipoma. It is somewhat soft and mobile under the skin. Plan to excise today.    After explaining the risks to include bleeding, infection, recurrence or need for re-excision, and scarring the patient wished to proceed.    Procedure:   The area was prepped and draped in usual sterile fashion with betadine. After adequate local anesthesia, an elliptical skin incision was made to encompass the lesion, 2.8cm x 0.7 cm. The cyst was carefully dissected free and delivered through the incision intact. There is one area where I did puncture the cyst wall, but the contents were contained and all the cyst wall is completely removed. This is an obvious benign cyst and will not be sent to pathology. The subcutaneous tissues were reapproximated with 3-0 vicryl in inverted interrupted fashion. The skin was closed with 4-0 monocryl suture in a running fashion.  Skin glue was applied.     Plan:  Patient will followup if there any problems with the wound including redness or drainage.      ELVIRA Medrano MD

## 2020-07-20 ENCOUNTER — HOSPITAL ENCOUNTER (OUTPATIENT)
Dept: CT IMAGING | Facility: OTHER | Age: 65
Discharge: HOME | End: 2020-07-20
Attending: INTERNAL MEDICINE | Admitting: INTERNAL MEDICINE
Payer: COMMERCIAL

## 2020-07-20 DIAGNOSIS — L81.4 LIVER SPOT: ICD-10-CM

## 2020-07-20 PROCEDURE — 74177 CT ABD & PELVIS W/CONTRAST: CPT

## 2020-07-20 PROCEDURE — 25500064 ZZH RX 255 OP 636: Performed by: INTERNAL MEDICINE

## 2020-07-20 RX ADMIN — IOHEXOL 100 ML: 350 INJECTION, SOLUTION INTRAVENOUS at 11:11

## 2020-07-29 ENCOUNTER — TELEPHONE (OUTPATIENT)
Dept: PEDIATRICS | Facility: OTHER | Age: 65
End: 2020-07-29

## 2020-07-29 NOTE — TELEPHONE ENCOUNTER
Reason for call: Request for results.    Name of test or procedure: CT scan     Date of test or procedure: 7/20/20    Location of test or procedure: [facility]    Preferred method for responding to this message: Telephone Call    Phone number patient can be reached at: Home number on file [phone] (home)    If we can't reach you directly, may we leave a detailed response at the number you provided?Yes

## 2020-07-29 NOTE — LETTER
July 30, 2020      Prasanna Alonso  [address] [facility]   [place] [address]    Good news, your spot appears benign.    Signed, Ariel Vincent MD, FAAP, FACP  Internal Medicine & Pediatrics    Recent Results (from the past 744 hour(s))   CT Abdomen Pelvis w Contrast    Narrative    Exam:CT ABDOMEN PELVIS W CONTRAST    History: 65 years Male hepatic lesion seen on CT lung cancer screening  exam     Comparisons: CT dated 6/23/2020    Technique: Axial CT imaging of the abdomen and pelvis was performed  with intervenous and oral contrast. Coronal and sagittal  reconstructions were obtained      FINDINGS:  Lung bases:The lung bases are clear    Abdomen:  Liver: There is a lesion in the upper right lobe liver measuring 3.1  cm transversely and 2.1 cm AP. There is peripheral nodular  enhancement.  Gallbladder and biliary tree:No calcified gallstones are present.  There is no evidence of biliary dilatation.  Pancreas:Unremarkable  Spleen:Unremarkable  Adrenals:Normal    Kidneys and ureters:Unremarkable    Lymph nodes:There is no significant lymphadenopathy    Bowel:No abnormally distended or thickened loops of bowel are present.  There is no evidence of bowel obstruction.    Appendix: No inflammatory changes are seen in the right lower  quadrant. The appendix is unremarkable.    Vessels: There is atherosclerotic disease of the abdominal aorta  without aneurysm.    Osseous structures: There is multilevel degenerative disc disease of  the lumbar spine.    Pelvis:There is no evidence of mass or lymphadenopathy. No abnormal  fluid collections are present.            Impression    IMPRESSION: Hepatic lesion. Enhancement pattern of the lesion is  highly suggestive of a benign hepatic hemangioma.    YOVANY LINCOLN MD               Sincerely,        Ariel Vincent MD

## 2020-07-30 NOTE — TELEPHONE ENCOUNTER
The spot appears benign.  Sorry, not sure why he didn't get a report.    Signed, Ariel Vincent MD, FAAP, FACP  Internal Medicine & Pediatrics

## 2021-03-30 ENCOUNTER — IMMUNIZATION (OUTPATIENT)
Dept: FAMILY MEDICINE | Facility: OTHER | Age: 66
End: 2021-03-30
Attending: FAMILY MEDICINE
Payer: COMMERCIAL

## 2021-03-30 PROCEDURE — 91300 PR COVID VAC PFIZER DIL RECON 30 MCG/0.3 ML IM: CPT

## 2021-04-20 ENCOUNTER — IMMUNIZATION (OUTPATIENT)
Dept: FAMILY MEDICINE | Facility: OTHER | Age: 66
End: 2021-04-20
Attending: FAMILY MEDICINE
Payer: COMMERCIAL

## 2021-04-20 PROCEDURE — 0002A PR COVID VAC PFIZER DIL RECON 30 MCG/0.3 ML IM: CPT

## 2023-04-15 PROBLEM — F11.90 CHRONIC, CONTINUOUS USE OF OPIOIDS: Status: ACTIVE | Noted: 2022-10-24

## 2023-04-15 PROBLEM — Z87.19 HISTORY OF GASTROINTESTINAL BLEEDING: Status: ACTIVE | Noted: 2021-04-13

## 2023-04-15 PROBLEM — M27.2 OSTEORADIONECROSIS OF MANDIBLE: Status: ACTIVE | Noted: 2022-06-16
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
## 2023-04-15 PROBLEM — C14.8: Status: ACTIVE | Noted: 2022-05-19

## 2023-08-02 NOTE — DISCHARGE SUMMARY
[facility]    Discharge Summary  Hospitalist    Date of Admission:  7/4/2019  Date of Discharge:  7/8/2019  Discharging Provider: Rabia Bolaños  Date of Service (when I saw the patient): 07/08/19    Discharge Diagnoses   Principal Problem:    GI bleed (7/4/2019), present on admission.  Resolved.  Active Problems:    Alcohol abuse (6/13/2011)    Tobacco use disorder (9/17/2012)      History of Present Illness   Prasanna Alonso is an 64 year old male who presented with dark tarry stool.    Hospital Course   Prasanna Alonso was admitted on 7/4/2019.  The following problems were addressed during his hospitalization:    Bloody stool.   GIB.  Patient was made n.p.o.  With n.p.o. diet his stools cleared and were no longer bloody.  Hemoglobin remained stable.  He was seen by surgery.  Initially had anticipated discharge home with outpatient colonoscopy.  However, developed some mild withdrawal symptoms on anticipated day of discharge.  Therefore he was started on CIWA protocol with Ativan as needed.  He had minimal requirements.  He did have electrolyte abnormalities.  These were replaced per protocol.  Patient ended up having endoscopy and colonoscopy prior to discharge.  Source of bleeding was not identified.  2 polyps are seen and removed.  Patient was started on Protonix twice a day.  He is advised to avoid alcohol and NSAIDs.  He will follow-up with his primary care doctor and surgery as scheduled.    Rabia Bolaños, DO    Significant Results and Procedures   Biopsies taken will need to be followed up by PCP and surgery team    Pending Results   These results will be followed up by PCP  Unresulted Labs Ordered in the Past 30 Days of this Admission     Date and Time Order Name Status Description    7/4/2019 1926 H Pylori antigen stool In process           Code Status   Full Code       Primary Care Physician   Physician No Ref-Primary    Physical Exam   Temp: 97.8  F (36.6  C) Temp src:  Lets stop it If swelling pain or problems to see us Tympanic BP: 102/68 Pulse: 102 Heart Rate: 82 Resp: 18 SpO2: 98 % O2 Device: None (Room air)    Vitals:    07/04/19 1756   Weight: 61.4 kg (135 lb 6.4 oz)     Vital Signs with Ranges  Temp:  [96.7  F (35.9  C)-98.7  F (37.1  C)] 97.8  F (36.6  C)  Pulse:  [] 102  Heart Rate:  [] 82  Resp:  [12-29] 18  BP: (102-129)/(66-99) 102/68  SpO2:  [94 %-100 %] 98 %  I/O last 3 completed shifts:  In: 5 [I.V.:5]  Out: 350 [Urine:350]    Constitutional: Awake alert and oriented.  No acute distress.  Underweight.  Eyes: Extraocular muscles intact.  Nonicteric, noninjected.  Lids normal.  ENT: Moist mucous membranes.  No oropharyngeal lesions.  Respiratory: Clear to auscultation bilaterally.  No wheezing, rhonchi, rales.  Breathing comfortably at rest on room air.  Cardiovascular: Regular rate.  No murmur.  GI: Abdomen is mildly distended but soft.  No rebound, guarding, or rigidity.  No organomegaly.  No ascites.    Discharge Disposition   Discharged to home  Condition at discharge: Stable    Consultations This Hospital Stay   SURGERY GENERAL IP CONSULT    Time Spent on this Encounter   IRabia, personally saw the patient today and spent greater than 30 minutes discharging this patient.    Discharge Orders   No discharge procedures on file.  Discharge Medications   Current Discharge Medication List      CONTINUE these medications which have NOT CHANGED    Details   gabapentin (NEURONTIN) 300 MG capsule Take 1 capsule (300 mg) by mouth 4 times daily  Qty: 120 capsule, Refills: 11    Associated Diagnoses: Alcoholic peripheral neuropathy (H)      acetaminophen (TYLENOL) 325 MG tablet Take 2 tablets by mouth every 4 hours as needed Max acetaminophen dose: 4000mg in 24 hrs      ibuprofen (ADVIL/MOTRIN) 200 MG tablet Take 400 mg by mouth every 6 hours           Allergies   No Known Allergies  Data   Most Recent 3 CBC's:  Recent Labs   Lab Test 07/05/19  0552 07/05/19  0007 07/04/19  1835 07/26/18  1357   WBC 5.4   --  4.9 6.1   HGB 10.8* 9.8* 10.8* 14.8     --  98 103*     --  222 196      Most Recent 3 BMP's:  Recent Labs   Lab Test 19  0552 19  1835 18  1357    131* 140   POTASSIUM 4.1 2.7* 4.0   CHLORIDE 100 90* 102   CO2 15* 17* 26   BUN 7 11 6*   CR 0.56* 0.69* 0.66*   ANIONGAP 20* 24* 12   LANI 8.4* 9.3 10.0   GLC 81 112* 94     Most Recent 2 LFT's:  Recent Labs   Lab Test 19  0552 19  1835   AST 23 17   ALT 13 13   ALKPHOS 33* 34   BILITOTAL 0.7 0.9     Most Recent INR's and Anticoagulation Dosing History:  Anticoagulation Dose History     Recent Dosing and Labs Latest Ref Rng & Units 2018    INR 0 - 1.3 0.98 1.02        Most Recent 3 Troponin's:No lab results found.  Most Recent Cholesterol Panel:  Recent Labs   Lab Test 17  1505   *   HDL 64   TRIG 91     Most Recent 6 Bacteria Isolates From Any Culture (See EPIC Reports for Culture Details):No lab results found.  Most Recent TSH, T4 and A1c Labs:No lab results found.  Results for orders placed or performed in visit on 12   [facility] INTERFACED RAD RESULT    Narrative    CORRECTED RESULT  INJ RAKAN TRANSFORAMINAL LS  DOS:12    MRN:[id number] MENDEZ    :1955    Performed By: RT JESSEE(R)(M)(CT)RINKU CRUZ  ________________________________________________________________________  ____________    Addendum# 1 to Original Report of 2012  CORRECTED REPORT: ADDED FOLLOW UP NOTE TO REPORT (KLS 8.13.12)    EXAM:  FOLLOWUP TO FLUOROSCOPICALLY GUIDED RIGHT L4-5 TRANSFORAMINAL  EPIDURAL STEROID INJECTION    The patient is happy with the results.  He reports nearly complete  relief of pain.    RV-1.    CASEY:sushil    Original Read and Approved By:            Typed By: RT KAITLYNN(R), NAI [facility] - 12  18:32            Read By:  JEN SCALES - 12  16:25            Approved By:  JEN SCALES - 12  17:25    EXAM: FLUOROSCOPICALLY GUIDED  RIGHT L4-5 TRANSFORAMINAL EPIDURAL  STEROID INJECTION    CLINICAL INFORMATION:  Lower back pain and right buttock and lower  extremity pain.  The patient has had previous right L4-5 transforaminal  injections with good clinical result.    TECHNICAL INFORMATION/INTERPRETATION:  The procedure was explained to  the patient, including the risks and benefits.  Informed consent was  obtained.  A timeout was done to assure correct patient identity and  procedure.    The patient was placed prone on the fluoroscopy table.  A skin site was  selected.  The skin was prepped and draped in sterile fashion.  Anesthesia was attained using 6 cc of buffered 1% lidocaine.  With  fluoroscopic guidance a 22 gauge spinal needle was placed so that the  tip was in the epidural space at the right lateral margin of the L4-5  intervertebral foramen, using a transforaminal approach.  After  negative aspiration for blood, epidural position was confirmed by  injection of 2 cc Omnipaque 240.  A mixture of 1 cc of Dexamethasone at  a strength of 10 mg/mL and 1 cc of 1% lidocaine MPF was administered.  Radiographs revealed dispersal of contrast in the epidural space.  The  needle was removed.  The skin was cleansed and bandaged.  There were no  immediate complications.    Total fluoroscopic time for the procedure was 34 seconds.    CONCLUSION:  1.  Successful uncomplicated right L4-5 transforaminal epidural steroid  injection.  2.  The patient reported complete relief of pain after the injection  (RV=2).    [facility]:mmj    _____________________________________________________  Ordering Physician: MD MARY ANN, SARAH SOSA    Reading/Approving Radiologist: SUHA ANDERS MD  [facility]  [phone] Phone  [phone] Fax    KLSI00:GI  D: 08/10/12 12:09 T: 08/10/12 15:08  ________________________________________________________________________  ________  DOS:07/17/12   Exam Time:14:30   VT:O     VID:[id number]  Clinical:RADICULOPATHY,  STENOSIS        [id number] RAKAN TRANSFORAMINAL LS            1

## 2024-06-10 ENCOUNTER — TRANSFERRED RECORDS (OUTPATIENT)
Dept: HEALTH INFORMATION MANAGEMENT | Facility: OTHER | Age: 69
End: 2024-06-10

## 2024-06-14 ENCOUNTER — NURSE TRIAGE (OUTPATIENT)
Dept: FAMILY MEDICINE | Facility: OTHER | Age: 69
End: 2024-06-14
Payer: COMMERCIAL

## 2024-06-14 ENCOUNTER — TRANSFERRED RECORDS (OUTPATIENT)
Dept: HEALTH INFORMATION MANAGEMENT | Facility: OTHER | Age: 69
End: 2024-06-14

## 2024-06-14 ENCOUNTER — HOSPITAL ENCOUNTER (EMERGENCY)
Facility: OTHER | Age: 69
End: 2024-06-14
Payer: COMMERCIAL

## 2024-06-14 NOTE — TELEPHONE ENCOUNTER
"Update: Notified Dr. Aristeo Silver who also advised patient come in to our ER here. Also called the Emergency Department, spoke with Juancarlos to notify that patient would be coming in to have this addressed.   Mabel Hemphill RN on 6/14/2024 at 2:15 PM    S-(situation): Patient's PEG tube came out about 10 AM this morning while cleaning it.    B-(background): Patient's sister, Dorene, whom we do not have a consent to communicate with, got patient on a 3 way call where he gave verbal permission to talk with his sister about his protected health information.    A-(assessment): Around 10 AM this morning he was cleaning his PEG feeding tube which was placed in Chauncey 15, 2024. It fell out.  Patient is unable to swallow, so is needing it replaced as soon as possible.  He has been unable to eat, drink, or take meds since it fell out.    R-(recommendations): Patient was advised to go to the ER as soon as possible.  Mabel Hemphill RN on 6/14/2024 at 1:59 PM    Reason for Disposition   G-tube (or PEG), J-tube, or GJ-tube came completely out of site in abdominal wall    Additional Information   Negative: Shock suspected (e.g., cold/pale/clammy skin, too weak to stand, low BP, rapid pulse)   Negative: [1] Shortness of breath AND [2] new-onset   Negative: Bluish (or gray) lips or face now   Negative: Sounds like a life-threatening emergency to the triager   Negative: SEVERE abdominal pain   Negative: [1] Vomiting AND [2] contains red blood or black (\"coffee ground\") material  (Exception: Few red streaks in vomit that only happened once.)   Negative: Tube contains red blood or black (\"coffee ground\") material   (Exception: Pink tinge of tube fluid occurs once and clears immediately with irrigation.)    Answer Assessment - Initial Assessment Questions  1. MAIN CONCERN OR SYMPTOM:  \"What is your main concern right now?\" \"What question do you have?\" \"What's the main symptom you're worried about?\" (e.g., fever, pain, redness, swelling)      PEG tube " "fell out this morning at 10 AM.  He has been unable to eat or drink anything.    2. ONSET: \"When did the symptom or problem start (or worsen)?\" (minutes, hours, days, weeks)      10 AM this morning.    3. WHAT TYPE: \"What type of feeding tube is it?\" (e.g., NG tube, NJ tube, G tube, GJ tube, J tube, PEG).      PEG tube    4. WHEN INSERTED: \"When was the tube put in\", \"Has it been changed, if so when?\"      Was put in, in January 15,2024 at [facility]    5. WHO INSERTED: \"Do you recall who put the tube in?\"      [facility], with Interventional Radiologist    6. FEEDINGS: \"Has the type, rate or concentration of the tube feedings changed?\"      Information not available    7. MEDICINES:  \"Are you taking any medications via your feeding tube?\"  \"Are these medications liquid or crushed given in your feeding tube?\"       Yes    8. VOMITING and DIARRHEA: \"Is there new vomiting or diarrhea?\" (e.g., number of time; description)      Denies    9. OTHER SYMPTOMS: \"What other symptoms are you having?\" (e.g., shortness of breath, fever, abdomen pain)      Nothing at all    10. HOME HEALTH NURSE: \"Do you have a home health (visiting) nurse?\"        Denies    Protocols used: Feeding Tube Symptoms and Tvohlybys-H-UB    "

## 2024-10-02 ENCOUNTER — TRANSFERRED RECORDS (OUTPATIENT)
Dept: HEALTH INFORMATION MANAGEMENT | Facility: OTHER | Age: 69
End: 2024-10-02

## 2024-11-15 ENCOUNTER — TRANSFERRED RECORDS (OUTPATIENT)
Dept: HEALTH INFORMATION MANAGEMENT | Facility: OTHER | Age: 69
End: 2024-11-15

## 2024-11-18 ENCOUNTER — TRANSFERRED RECORDS (OUTPATIENT)
Dept: HEALTH INFORMATION MANAGEMENT | Facility: OTHER | Age: 69
End: 2024-11-18

## 2024-12-23 ENCOUNTER — TRANSFERRED RECORDS (OUTPATIENT)
Dept: HEALTH INFORMATION MANAGEMENT | Facility: OTHER | Age: 69
End: 2024-12-23

## 2024-12-31 ENCOUNTER — TRANSFERRED RECORDS (OUTPATIENT)
Dept: HEALTH INFORMATION MANAGEMENT | Facility: OTHER | Age: 69
End: 2024-12-31

## 2025-02-21 ENCOUNTER — TRANSFERRED RECORDS (OUTPATIENT)
Dept: HEALTH INFORMATION MANAGEMENT | Facility: OTHER | Age: 70
End: 2025-02-21

## 2025-03-11 ENCOUNTER — OFFICE VISIT (OUTPATIENT)
Dept: PEDIATRICS | Facility: OTHER | Age: 70
End: 2025-03-11
Attending: INTERNAL MEDICINE
Payer: COMMERCIAL

## 2025-03-11 VITALS
BODY MASS INDEX: 31.44 KG/M2 | DIASTOLIC BLOOD PRESSURE: 70 MMHG | RESPIRATION RATE: 16 BRPM | OXYGEN SATURATION: 94 % | HEART RATE: 114 BPM | TEMPERATURE: 98.3 F | WEIGHT: 188.7 LBS | HEIGHT: 65 IN | SYSTOLIC BLOOD PRESSURE: 118 MMHG

## 2025-03-11 DIAGNOSIS — K76.9 LIVER DISEASE, UNSPECIFIED: ICD-10-CM

## 2025-03-11 DIAGNOSIS — Z76.89 ENCOUNTER TO ESTABLISH CARE: Primary | ICD-10-CM

## 2025-03-11 DIAGNOSIS — G89.4 CHRONIC PAIN SYNDROME: ICD-10-CM

## 2025-03-11 DIAGNOSIS — Z92.21 STATUS POST CHEMOTHERAPY: ICD-10-CM

## 2025-03-11 DIAGNOSIS — R74.8 ELEVATED LIVER ENZYMES: ICD-10-CM

## 2025-03-11 DIAGNOSIS — Z93.1 GASTROSTOMY TUBE DEPENDENT (H): ICD-10-CM

## 2025-03-11 DIAGNOSIS — E44.0 MODERATE PROTEIN-CALORIE MALNUTRITION: ICD-10-CM

## 2025-03-11 DIAGNOSIS — C14.8: ICD-10-CM

## 2025-03-11 DIAGNOSIS — M54.42 CHRONIC BILATERAL LOW BACK PAIN WITH BILATERAL SCIATICA: ICD-10-CM

## 2025-03-11 DIAGNOSIS — Z11.59 ENCOUNTER FOR SCREENING FOR OTHER VIRAL DISEASES: ICD-10-CM

## 2025-03-11 DIAGNOSIS — E66.09 NON MORBID OBESITY DUE TO EXCESS CALORIES: ICD-10-CM

## 2025-03-11 DIAGNOSIS — K70.9 ALCOHOLIC LIVER DISEASE, UNSPECIFIED: ICD-10-CM

## 2025-03-11 DIAGNOSIS — G89.29 CHRONIC BILATERAL LOW BACK PAIN WITH BILATERAL SCIATICA: ICD-10-CM

## 2025-03-11 DIAGNOSIS — Z13.29 SCREENING FOR THYROID DISORDER: ICD-10-CM

## 2025-03-11 DIAGNOSIS — N18.1 CHRONIC KIDNEY DISEASE, STAGE 1: ICD-10-CM

## 2025-03-11 DIAGNOSIS — M54.41 CHRONIC BILATERAL LOW BACK PAIN WITH BILATERAL SCIATICA: ICD-10-CM

## 2025-03-11 DIAGNOSIS — Z98.890 HISTORY OF RADICAL NECK SURGERY: ICD-10-CM

## 2025-03-11 DIAGNOSIS — R73.09 ELEVATED GLUCOSE: ICD-10-CM

## 2025-03-11 DIAGNOSIS — Z92.3 HISTORY OF RADIATION TO HEAD AND NECK REGION: ICD-10-CM

## 2025-03-11 DIAGNOSIS — R06.89 OTHER ABNORMALITIES OF BREATHING: ICD-10-CM

## 2025-03-11 DIAGNOSIS — F10.10 ALCOHOL ABUSE, DAILY USE: ICD-10-CM

## 2025-03-11 DIAGNOSIS — Z13.220 LIPID SCREENING: ICD-10-CM

## 2025-03-11 LAB
ALBUMIN SERPL BCG-MCNC: 3.8 G/DL (ref 3.5–5.2)
ALP SERPL-CCNC: 87 U/L (ref 40–150)
ALT SERPL W P-5'-P-CCNC: 41 U/L (ref 0–70)
ANION GAP SERPL CALCULATED.3IONS-SCNC: 15 MMOL/L (ref 7–15)
AST SERPL W P-5'-P-CCNC: 72 U/L (ref 0–45)
BILIRUB SERPL-MCNC: 0.9 MG/DL
BUN SERPL-MCNC: 14.8 MG/DL (ref 8–23)
CALCIUM SERPL-MCNC: 9.3 MG/DL (ref 8.8–10.4)
CHLORIDE SERPL-SCNC: 102 MMOL/L (ref 98–107)
CHOLEST SERPL-MCNC: 196 MG/DL
CREAT SERPL-MCNC: 0.77 MG/DL (ref 0.67–1.17)
EGFRCR SERPLBLD CKD-EPI 2021: >90 ML/MIN/1.73M2
ERYTHROCYTE [DISTWIDTH] IN BLOOD BY AUTOMATED COUNT: 14.3 % (ref 10–15)
EST. AVERAGE GLUCOSE BLD GHB EST-MCNC: 105 MG/DL
FASTING STATUS PATIENT QL REPORTED: NO
FASTING STATUS PATIENT QL REPORTED: NO
GLUCOSE SERPL-MCNC: 124 MG/DL (ref 70–99)
HBA1C MFR BLD: 5.3 %
HCO3 SERPL-SCNC: 23 MMOL/L (ref 22–29)
HCT VFR BLD AUTO: 44.8 % (ref 40–53)
HDLC SERPL-MCNC: 80 MG/DL
HGB BLD-MCNC: 15.3 G/DL (ref 13.3–17.7)
IRON BINDING CAPACITY (ROCHE): 313 UG/DL (ref 240–430)
IRON SATN MFR SERPL: 47 % (ref 15–46)
IRON SERPL-MCNC: 146 UG/DL (ref 61–157)
LDLC SERPL CALC-MCNC: 94 MG/DL
MCH RBC QN AUTO: 35.3 PG (ref 26.5–33)
MCHC RBC AUTO-ENTMCNC: 34.2 G/DL (ref 31.5–36.5)
MCV RBC AUTO: 104 FL (ref 78–100)
NONHDLC SERPL-MCNC: 116 MG/DL
NT-PROBNP SERPL-MCNC: 189 PG/ML (ref 0–900)
PLATELET # BLD AUTO: 162 10E3/UL (ref 150–450)
POTASSIUM SERPL-SCNC: 4 MMOL/L (ref 3.4–5.3)
PREALB SERPL-MCNC: 19.6 MG/DL (ref 20–40)
PROT SERPL-MCNC: 7.6 G/DL (ref 6.4–8.3)
RBC # BLD AUTO: 4.33 10E6/UL (ref 4.4–5.9)
SODIUM SERPL-SCNC: 140 MMOL/L (ref 135–145)
T4 FREE SERPL-MCNC: 1.18 NG/DL (ref 0.9–1.7)
TRIGL SERPL-MCNC: 109 MG/DL
TSH SERPL DL<=0.005 MIU/L-ACNC: 5.2 UIU/ML (ref 0.3–4.2)
VIT B12 SERPL-MCNC: 571 PG/ML (ref 232–1245)
WBC # BLD AUTO: 7.2 10E3/UL (ref 4–11)

## 2025-03-11 PROCEDURE — 86709 HEPATITIS A IGM ANTIBODY: CPT | Mod: ZL | Performed by: INTERNAL MEDICINE

## 2025-03-11 PROCEDURE — 36415 COLL VENOUS BLD VENIPUNCTURE: CPT | Mod: ZL | Performed by: INTERNAL MEDICINE

## 2025-03-11 PROCEDURE — 82607 VITAMIN B-12: CPT | Mod: ZL | Performed by: INTERNAL MEDICINE

## 2025-03-11 PROCEDURE — 83880 ASSAY OF NATRIURETIC PEPTIDE: CPT | Mod: ZL | Performed by: INTERNAL MEDICINE

## 2025-03-11 PROCEDURE — 84439 ASSAY OF FREE THYROXINE: CPT | Mod: ZL | Performed by: INTERNAL MEDICINE

## 2025-03-11 PROCEDURE — 83550 IRON BINDING TEST: CPT | Mod: ZL | Performed by: INTERNAL MEDICINE

## 2025-03-11 PROCEDURE — 83036 HEMOGLOBIN GLYCOSYLATED A1C: CPT | Mod: ZL | Performed by: INTERNAL MEDICINE

## 2025-03-11 PROCEDURE — 87340 HEPATITIS B SURFACE AG IA: CPT | Mod: ZL | Performed by: INTERNAL MEDICINE

## 2025-03-11 PROCEDURE — 86704 HEP B CORE ANTIBODY TOTAL: CPT | Mod: ZL | Performed by: INTERNAL MEDICINE

## 2025-03-11 PROCEDURE — 84443 ASSAY THYROID STIM HORMONE: CPT | Mod: ZL | Performed by: INTERNAL MEDICINE

## 2025-03-11 PROCEDURE — 85027 COMPLETE CBC AUTOMATED: CPT | Mod: ZL | Performed by: INTERNAL MEDICINE

## 2025-03-11 PROCEDURE — 86706 HEP B SURFACE ANTIBODY: CPT | Mod: ZL | Performed by: INTERNAL MEDICINE

## 2025-03-11 PROCEDURE — 80061 LIPID PANEL: CPT | Mod: ZL | Performed by: INTERNAL MEDICINE

## 2025-03-11 PROCEDURE — 80053 COMPREHEN METABOLIC PANEL: CPT | Mod: ZL | Performed by: INTERNAL MEDICINE

## 2025-03-11 PROCEDURE — 82306 VITAMIN D 25 HYDROXY: CPT | Mod: ZL | Performed by: INTERNAL MEDICINE

## 2025-03-11 PROCEDURE — G0463 HOSPITAL OUTPT CLINIC VISIT: HCPCS

## 2025-03-11 PROCEDURE — 84134 ASSAY OF PREALBUMIN: CPT | Mod: ZL | Performed by: INTERNAL MEDICINE

## 2025-03-11 PROCEDURE — 86803 HEPATITIS C AB TEST: CPT | Mod: ZL | Performed by: INTERNAL MEDICINE

## 2025-03-11 PROCEDURE — 83540 ASSAY OF IRON: CPT | Mod: ZL | Performed by: INTERNAL MEDICINE

## 2025-03-11 RX ORDER — FENTANYL 25 UG/1
1 PATCH TRANSDERMAL
COMMUNITY
Start: 2025-02-25

## 2025-03-11 RX ORDER — FAMOTIDINE 20 MG/1
20 TABLET, FILM COATED ORAL 2 TIMES DAILY
COMMUNITY

## 2025-03-11 RX ORDER — FENTANYL 12.5 UG/1
1 PATCH TRANSDERMAL
COMMUNITY
Start: 2025-01-22

## 2025-03-11 RX ORDER — IBUPROFEN 100 MG/5ML
20 SUSPENSION ORAL EVERY 6 HOURS PRN
COMMUNITY
Start: 2025-02-24

## 2025-03-11 RX ORDER — CHLORHEXIDINE GLUCONATE ORAL RINSE 1.2 MG/ML
15 SOLUTION DENTAL 2 TIMES DAILY
COMMUNITY
Start: 2025-02-21

## 2025-03-11 RX ORDER — OXYCODONE HYDROCHLORIDE 10 MG/1
10 TABLET ORAL EVERY 4 HOURS PRN
COMMUNITY
Start: 2025-02-21

## 2025-03-11 ASSESSMENT — ANXIETY QUESTIONNAIRES
7. FEELING AFRAID AS IF SOMETHING AWFUL MIGHT HAPPEN: SEVERAL DAYS
4. TROUBLE RELAXING: SEVERAL DAYS
GAD7 TOTAL SCORE: 6
1. FEELING NERVOUS, ANXIOUS, OR ON EDGE: SEVERAL DAYS
3. WORRYING TOO MUCH ABOUT DIFFERENT THINGS: SEVERAL DAYS
5. BEING SO RESTLESS THAT IT IS HARD TO SIT STILL: SEVERAL DAYS
7. FEELING AFRAID AS IF SOMETHING AWFUL MIGHT HAPPEN: SEVERAL DAYS
GAD7 TOTAL SCORE: 6
IF YOU CHECKED OFF ANY PROBLEMS ON THIS QUESTIONNAIRE, HOW DIFFICULT HAVE THESE PROBLEMS MADE IT FOR YOU TO DO YOUR WORK, TAKE CARE OF THINGS AT HOME, OR GET ALONG WITH OTHER PEOPLE: SOMEWHAT DIFFICULT
GAD7 TOTAL SCORE: 6
2. NOT BEING ABLE TO STOP OR CONTROL WORRYING: SEVERAL DAYS
8. IF YOU CHECKED OFF ANY PROBLEMS, HOW DIFFICULT HAVE THESE MADE IT FOR YOU TO DO YOUR WORK, TAKE CARE OF THINGS AT HOME, OR GET ALONG WITH OTHER PEOPLE?: SOMEWHAT DIFFICULT
6. BECOMING EASILY ANNOYED OR IRRITABLE: NOT AT ALL

## 2025-03-11 ASSESSMENT — LIFESTYLE VARIABLES
AUDIT-C TOTAL SCORE: 6
HOW OFTEN DO YOU HAVE SIX OR MORE DRINKS ON ONE OCCASION: NEVER
HOW MANY STANDARD DRINKS CONTAINING ALCOHOL DO YOU HAVE ON A TYPICAL DAY: 5 OR 6
SKIP TO QUESTIONS 9-10: 0
HOW OFTEN DO YOU HAVE A DRINK CONTAINING ALCOHOL: 4 OR MORE TIMES A WEEK

## 2025-03-11 ASSESSMENT — PAIN SCALES - GENERAL: PAINLEVEL_OUTOF10: SEVERE PAIN (7)

## 2025-03-11 ASSESSMENT — PATIENT HEALTH QUESTIONNAIRE - PHQ9
SUM OF ALL RESPONSES TO PHQ QUESTIONS 1-9: 11
10. IF YOU CHECKED OFF ANY PROBLEMS, HOW DIFFICULT HAVE THESE PROBLEMS MADE IT FOR YOU TO DO YOUR WORK, TAKE CARE OF THINGS AT HOME, OR GET ALONG WITH OTHER PEOPLE: VERY DIFFICULT
SUM OF ALL RESPONSES TO PHQ QUESTIONS 1-9: 11

## 2025-03-11 NOTE — NURSING NOTE
"Chief Complaint   Patient presents with    Edema     Feet swelling and painful    weight gain       Initial /70   Pulse 114   Temp 98.3  F (36.8  C) (Tympanic)   Resp 16   Ht 1.651 m (5' 5\")   Wt 85.6 kg (188 lb 11.2 oz)   SpO2 94%   BMI 31.40 kg/m   Estimated body mass index is 31.4 kg/m  as calculated from the following:    Height as of this encounter: 1.651 m (5' 5\").    Weight as of this encounter: 85.6 kg (188 lb 11.2 oz).  Medication Review: complete    The next two questions are to help us understand your food security.  If you are feeling you need any assistance in this area, we have resources available to support you today.           No data to display                  Health Care Directive:  Patient does not have a Health Care Directive: Discussed advance care planning with patient; however, patient declined at this time.    Norma J. Gosselin, LPN      "

## 2025-03-11 NOTE — PATIENT INSTRUCTIONS
-- Obtain records from [place] [facility]   -- Obtain records from [facility], see if you can connect our charts together via [facility]    Back Pain    Modifiable Risk Factors  Maintain a healthy weight. Losing 5% can be very helpful.  A loss of 10 lbs, will result in 80 lbs of force lifted off of your back.  Core muscle strength. Keeping up with a physical therapy home exercise program, and/or regular yoga practice keeps your back healthy.  Don't use tobacco products.  People that smoke have more bulging discs, and they take longer to heal.  Try not to injure it. Be smart when doing activities that require bending, twisting and lifting such as gardening.     Treatments   -- Try Voltaren (diclofenac) gel. Apply topically to low back up to 4 times per day as needed for pain.   -- Okay to use NSAIDs by mouth, but shortest duration is best as they can hurt stomach and kidney  Example: Ibuprofen 600 mg (3 tablets) 3 times per day for 7 days, then as needed   -- Rest   -- Ice/heat whichever feels better   -- Topicals: Aspercreme/IcyHot, lidocaine, capsaicin   -- Schedule visit for physical therapy   -- Monitor for warning signs including foot drop, groin numbness, new incontinence or other concerns and return same day for evaluation   -- Otherwise, return if you are not improving over the next 4 weeks    Patient Education       Relieving Back Pain  Back pain is a common problem. You can strain back muscles by lifting too much weight or just by moving the wrong way. Back strain can be uncomfortable, even painful. And it can take weeks or months to improve. To help yourself feel better and prevent future back strains, try these tips.  Important: Don't give aspirin to children or teens without first discussing it with your child's healthcare provider.  Ice    Ice reduces muscle pain and swelling. It helps most during the first 24 to 48 hours after an injury.  Wrap an ice pack or a bag of frozen peas in a thin towel.  Never put ice directly on your skin.  Place the ice where your back hurts the most.  Don t ice for more than 20 minutes at a time.  You can use ice several times a day.  Medicines  Over-the-counter pain relievers include acetaminophen and anti-inflammatory medicines, which includes aspirin, naproxen, or ibuprofen. They can help ease discomfort. Some also reduce swelling.  Tell your healthcare provider about any medicines you are already taking.  Take medicines only as directed.  Manipulation and massage  Having manipulation by an osteopathic doctor or chiropractor may be helpful. Getting a massage also may help.   Heat  After the first 48 hours, heat can relax sore muscles and improve blood flow.  Try a warm bath or shower. Or use a heating pad set on low. To prevent a burn, keep a cloth between you and the heating pad.  Don t use a heating pad for more than 15 minutes at a time. Never sleep on a heating pad.  Date Last Reviewed: 6/1/2018 2000-2019 [employer]. [address] [place] [address], [place] [address]. All rights reserved. This information is not intended as a substitute for professional medical care. Always follow your healthcare professional's instructions.

## 2025-03-11 NOTE — PROGRESS NOTES
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 Currently on fentanyl patches and oxycodone with inadequate pain relief. Transition to buprenorphine discussed for improved pain management and reduced opioid dependence risk. Buprenorphine offers lower overdose and dependence risk compared to other opioids. Transition requires reviewing previous medical records for continuity and safety.  - Review previous medical records from [facility] and [place] to understand current pain management strategy  - Discuss potential transition to buprenorphine for pain management  - Schedule follow-up appointment in three weeks to reassess pain management strategy  - Ensure regular follow-up every 90 days for pain management and overall health monitoring    Feeding difficulties with gastrostomy tube  Feeding tube in place due to dysphagia post jaw and neck surgery. Currently receiving tube feeds from [facility], managed by Dr. Barnes. Nutritional status and adequacy of tube feeds require assessment for proper dietary management.  - Review nutritional status and ensure appropriate dietary management  - Coordinate with a dietician to assess nutritional needs and adequacy of tube feeds    Lower back pain  Chronic lower back pain with possible radiculopathy, exacerbated by physical activity. Minor scoliosis and previous spinal injections noted. Ice packs provide temporary relief. Physical therapy recommended, with weight loss and smoking cessation as additional measures. MRI considered if no improvement with physical therapy.  - Refer to physical therapy for back pain management  - Consider MRI if no improvement with physical therapy  - Educate on red flags for back pain, including new weakness or bowel/bladder changes    Peripheral edema  Chronic bilateral lower extremity edema, more pronounced on  the left. No history of heart failure or myocardial infarction. Differential includes cardiac, hepatic, or nutritional causes. Echocardiogram and lab work necessary to evaluate cardiac function, liver function, and nutritional status.  - Order echocardiogram to assess cardiac function  - Conduct lab work to evaluate liver function and nutritional status    Alcohol use  Continued alcohol use via gastrostomy tube, potentially contributing to elevated liver enzymes and peripheral edema. Discussed impact of alcohol on liver health. Reduction or cessation of alcohol intake encouraged to improve liver health and overall well-being.  - Discuss risks of alcohol use on liver health and overall well-being  - Encourage reduction or cessation of alcohol intake    Coordination of care  Lack of primary care provider leading to fragmented care. Multiple specialists involved without central coordination. Establishing a primary care provider is essential for coordinated care and prescription management.  - Facilitate transfer of medical records from [facility] and [place]  - Establish as primary care provider to coordinate care and manage prescriptions    Follow-up  Follow-up appointment scheduled to reassess pain management and review lab results. Coordination of care and review of medical records essential for comprehensive management.  - Schedule follow-up appointment for April 11, 2025  - Ensure completion of lab work prior to follow-up appointment    Patient Instructions    -- Obtain records from [place] doctors   -- Obtain records from [facility], see if you can connect [facility] charts [facility]    Back Pain    Modifiable Risk Factors  Maintain a healthy weight. Losing 5% can be very helpful.  A loss of 10 lbs, will result in 80 lbs of force lifted off of your back.  Core muscle strength. Keeping up with a physical therapy home exercise program, and/or regular yoga practice keeps your back healthy.  Don't use  tobacco products.  People that smoke have more bulging discs, and they take longer to heal.  Try not to injure it. Be smart when doing activities that require bending, twisting and lifting such as gardening.     Treatments   -- Try Voltaren (diclofenac) gel. Apply topically to low back up to 4 times per day as needed for pain.   -- Okay to use NSAIDs by mouth, but shortest duration is best as they can hurt stomach and kidney  Example: Ibuprofen 600 mg (3 tablets) 3 times per day for 7 days, then as needed   -- Rest   -- Ice/heat whichever feels better   -- Topicals: Aspercreme/IcyHot, lidocaine, capsaicin   -- Schedule visit for physical therapy   -- Monitor for warning signs including foot drop, groin numbness, new incontinence or other concerns and return same day for evaluation   -- Otherwise, return if you are not improving over the next 4 weeks        Return in about 1 month (around 4/11/2025), or if symptoms worsen or fail to improve, for follow-up results, discuss pain management.    Signed, Ariel Vincent MD, FAAP, FACP  Internal Medicine & Pediatrics    Subjective   Prasanna Alonso is a 69 year old male who presents for Edema (Feet swelling and painful) and weight gain.    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 He underwent surgery, chemotherapy, and radiation for his cancer. The surgery involved reconstruction of his jaw using bone and veins from his leg. He has not had any cancer treatments since May 2021 and is currently under the care of an oncology clinic. He reports that his pain is not well-managed with his current medication, which includes fentanyl patches and oxycodone. He also reports increased difficulty with his lower back, to the point where he requires a cart for mobility during shopping trips. He has been using a bag of frozen peas for temporary relief.    In addition to  "his pain, Prasanna has been experiencing swelling in his feet. He reports that the swelling is consistent and does not decrease in the morning. He also has a feeding tube due to difficulty swallowing, which he attributes to the surgery and radiation treatments. He has not been able to eat a meal since his surgery and reports that attempts to drink through a straw result in the liquid going up his nose. He is currently receiving tube feeds, which he orders from [facility].    Objective   Vitals: /70   Pulse 114   Temp 98.3  F (36.8  C) (Tympanic)   Resp 16   Ht 1.651 m (5' 5\")   Wt 85.6 kg (188 lb 11.2 oz)   SpO2 94%   BMI 31.40 kg/m      Physical Exam  CHEST: Lungs clear to auscultation with fine rales at the bases.    Results  LABS  Liver enzymes: elevated  Blood glucose: elevated    Review and Analysis of Data   I personally reviewed the following:  External notes:  unavailable, tried care everywhere  Results: Yes local lab  Use of an independent historian: Yes family member  Independent review of a test performed by another physician: No  Discussion of management with another physician: No  Moderate risk of morbidity from additional diagnostic testing and/or treatment.    Consent was obtained from the patient/parent to use an AI documentation tool in the creation of this note.  "

## 2025-03-13 LAB
HAV IGM SERPL QL IA: NONREACTIVE
HBV CORE AB SERPL QL IA: NONREACTIVE
HBV SURFACE AB SERPL IA-ACNC: <3.5 M[IU]/ML
HBV SURFACE AB SERPL IA-ACNC: NONREACTIVE M[IU]/ML
HBV SURFACE AG SERPL QL IA: NONREACTIVE
HCV AB SERPL QL IA: NONREACTIVE
VIT D+METAB SERPL-MCNC: 43 NG/ML (ref 20–50)

## 2025-03-26 ENCOUNTER — HOSPITAL ENCOUNTER (OUTPATIENT)
Dept: CARDIOLOGY | Facility: OTHER | Age: 70
Discharge: HOME | End: 2025-03-26
Attending: INTERNAL MEDICINE
Payer: COMMERCIAL

## 2025-03-26 DIAGNOSIS — R74.8 ELEVATED LIVER ENZYMES: ICD-10-CM

## 2025-03-26 DIAGNOSIS — Z98.890 HISTORY OF RADICAL NECK SURGERY: ICD-10-CM

## 2025-03-26 DIAGNOSIS — M54.42 CHRONIC BILATERAL LOW BACK PAIN WITH BILATERAL SCIATICA: ICD-10-CM

## 2025-03-26 DIAGNOSIS — Z92.21 STATUS POST CHEMOTHERAPY: ICD-10-CM

## 2025-03-26 DIAGNOSIS — G89.29 CHRONIC BILATERAL LOW BACK PAIN WITH BILATERAL SCIATICA: ICD-10-CM

## 2025-03-26 DIAGNOSIS — E66.09 NON MORBID OBESITY DUE TO EXCESS CALORIES: ICD-10-CM

## 2025-03-26 DIAGNOSIS — Z13.29 SCREENING FOR THYROID DISORDER: ICD-10-CM

## 2025-03-26 DIAGNOSIS — M54.41 CHRONIC BILATERAL LOW BACK PAIN WITH BILATERAL SCIATICA: ICD-10-CM

## 2025-03-26 DIAGNOSIS — R73.09 ELEVATED GLUCOSE: ICD-10-CM

## 2025-03-26 DIAGNOSIS — F10.10 ALCOHOL ABUSE, DAILY USE: ICD-10-CM

## 2025-03-26 DIAGNOSIS — C14.8: ICD-10-CM

## 2025-03-26 DIAGNOSIS — G89.4 CHRONIC PAIN SYNDROME: ICD-10-CM

## 2025-03-26 DIAGNOSIS — Z93.1 GASTROSTOMY TUBE DEPENDENT (H): ICD-10-CM

## 2025-03-26 DIAGNOSIS — Z13.220 LIPID SCREENING: ICD-10-CM

## 2025-03-26 DIAGNOSIS — Z76.89 ENCOUNTER TO ESTABLISH CARE: ICD-10-CM

## 2025-03-26 DIAGNOSIS — Z92.3 HISTORY OF RADIATION TO HEAD AND NECK REGION: ICD-10-CM

## 2025-03-26 LAB — LVEF ECHO: NORMAL

## 2025-03-26 PROCEDURE — 93306 TTE W/DOPPLER COMPLETE: CPT

## 2025-03-30 ENCOUNTER — HEALTH MAINTENANCE LETTER (OUTPATIENT)
Age: 70
End: 2025-03-30

## 2025-05-22 ENCOUNTER — OFFICE VISIT (OUTPATIENT)
Dept: FAMILY MEDICINE | Facility: OTHER | Age: 70
End: 2025-05-22
Attending: INTERNAL MEDICINE
Payer: COMMERCIAL

## 2025-05-22 VITALS — WEIGHT: 185.2 LBS | BODY MASS INDEX: 30.82 KG/M2

## 2025-05-22 DIAGNOSIS — E44.0 MODERATE PROTEIN-CALORIE MALNUTRITION: ICD-10-CM

## 2025-05-22 DIAGNOSIS — Z93.1 GASTROSTOMY TUBE DEPENDENT (H): ICD-10-CM

## 2025-05-22 DIAGNOSIS — Z98.890 HISTORY OF RADICAL NECK SURGERY: ICD-10-CM

## 2025-05-22 DIAGNOSIS — N18.1 CHRONIC KIDNEY DISEASE, STAGE 1: ICD-10-CM

## 2025-05-22 PROCEDURE — 97802 MEDICAL NUTRITION INDIV IN: CPT | Performed by: DIETITIAN, REGISTERED

## 2025-05-22 NOTE — PROGRESS NOTES
"Prasanna is here today with his sister for MNT related to his enteral feeding.     PCP: Ariel Vincent    West has been on tube feedings for ~4 years. He does not eat anything orally. He started on 6-7 cans daily and now is down to ~3 cans daily of Osmolite 1.2. He reported a stable wt now over past 2 months with this plan. He has been taking 3 cans for ~6 months. This would provide: 40 g protein, 855 kcal, 585 ml fluids. He has been flushing after each feeding TID with ~4 oz water. He does not flush with water between meals. He feels he may be a bit dehydrated as his skin is dry. West reports decreased energy as well. West and his sister reported an increase of about 40# since starting the enteral feedings 4 years ago. He is concerned with not gaining any more weight.     Estimated needs for 5'5\" male @ 62 kg (136#) would be with a sedentary lifestyle: 1240 kcal (20 kcal/kg), 62 g protein (1 g/kg), 1240 ml fluids at minimum.     Dx:   Encounter Diagnoses   Name Primary?    History of radical neck surgery     Gastrostomy tube dependent (H)     Moderate protein-calorie malnutrition     Chronic kidney disease, stage 1      Diet: recommend increasing Osmolite 1.2 to 4 cans daily to provide: 1140 kcal, 53 g protein, 975 ml fluids.     Plan/goal: West will increase his feedings to 4 cans daily. He will add ~1 oz water to each can for increased fluid intakes as well as thinning it out for easier administration. West will monitor his weight. If he gains, will readdress and considering adding a liquid protein supplement as I feel he is not getting enough calories or protein at this time with 3 cans daily. He will continue to flush after each feeding with at least 4 oz water.     West and his sister stated understanding and had no questions at this time.     Follow up encouraged in a few months or sooner if wt gain occurs.     Time spent: 42 min     Sultana Priest RD on 5/22/2025 at 1:48 PM    Answers submitted by the patient " for this visit:  General Questionnaire (Submitted on 4/17/2025)  Chief Complaint: Chronic problems general questions HPI Form  What is the reason for your visit today? : follow up  How many servings of fruits and vegetables do you eat daily?: 0-1  On average, how many sweetened beverages do you drink each day (Examples: soda, juice, sweet tea, etc.  Do NOT count diet or artificially sweetened beverages)?: 0  How many minutes a day do you exercise enough to make your heart beat faster?: 9 or less  How many days a week do you exercise enough to make your heart beat faster?: 3 or less  How many days per week do you miss taking your medication?: 0  Questionnaire about: Chronic problems general questions HPI Form (Submitted on 4/17/2025)  Chief Complaint: Chronic problems general questions HPI Form

## 2025-06-12 ENCOUNTER — OFFICE VISIT (OUTPATIENT)
Dept: PEDIATRICS | Facility: OTHER | Age: 70
End: 2025-06-12
Attending: INTERNAL MEDICINE
Payer: COMMERCIAL

## 2025-06-12 VITALS
DIASTOLIC BLOOD PRESSURE: 74 MMHG | BODY MASS INDEX: 30.79 KG/M2 | OXYGEN SATURATION: 94 % | RESPIRATION RATE: 16 BRPM | SYSTOLIC BLOOD PRESSURE: 118 MMHG | HEART RATE: 120 BPM | TEMPERATURE: 97.3 F | WEIGHT: 185 LBS

## 2025-06-12 DIAGNOSIS — Z92.3 HISTORY OF RADIATION TO HEAD AND NECK REGION: ICD-10-CM

## 2025-06-12 DIAGNOSIS — Z93.1 GASTROSTOMY TUBE DEPENDENT (H): ICD-10-CM

## 2025-06-12 DIAGNOSIS — C14.8: Primary | ICD-10-CM

## 2025-06-12 DIAGNOSIS — Z92.21 STATUS POST CHEMOTHERAPY: ICD-10-CM

## 2025-06-12 DIAGNOSIS — F39 MOOD DISORDER: ICD-10-CM

## 2025-06-12 DIAGNOSIS — E44.0 MODERATE PROTEIN-CALORIE MALNUTRITION: ICD-10-CM

## 2025-06-12 DIAGNOSIS — N18.1 CHRONIC KIDNEY DISEASE, STAGE 1: ICD-10-CM

## 2025-06-12 DIAGNOSIS — Z98.890 HISTORY OF RADICAL NECK SURGERY: ICD-10-CM

## 2025-06-12 DIAGNOSIS — F10.10 ALCOHOL ABUSE, DAILY USE: ICD-10-CM

## 2025-06-12 DIAGNOSIS — R68.89 COPIOUS ORAL SECRETIONS: ICD-10-CM

## 2025-06-12 DIAGNOSIS — R60.0 LOWER EXTREMITY EDEMA: ICD-10-CM

## 2025-06-12 PROCEDURE — G0463 HOSPITAL OUTPT CLINIC VISIT: HCPCS

## 2025-06-12 RX ORDER — AMOXICILLIN AND CLAVULANATE POTASSIUM 400; 57 MG/5ML; MG/5ML
875 POWDER, FOR SUSPENSION ORAL 2 TIMES DAILY
Qty: 153.16 ML | Refills: 0 | Status: SHIPPED | OUTPATIENT
Start: 2025-06-12 | End: 2025-06-19

## 2025-06-12 RX ORDER — SERTRALINE HYDROCHLORIDE 20 MG/ML
25 SOLUTION ORAL DAILY
Qty: 60 ML | Refills: 1 | Status: SHIPPED | OUTPATIENT
Start: 2025-06-12

## 2025-06-12 ASSESSMENT — PATIENT HEALTH QUESTIONNAIRE - PHQ9
10. IF YOU CHECKED OFF ANY PROBLEMS, HOW DIFFICULT HAVE THESE PROBLEMS MADE IT FOR YOU TO DO YOUR WORK, TAKE CARE OF THINGS AT HOME, OR GET ALONG WITH OTHER PEOPLE: EXTREMELY DIFFICULT
SUM OF ALL RESPONSES TO PHQ QUESTIONS 1-9: 21
SUM OF ALL RESPONSES TO PHQ QUESTIONS 1-9: 21

## 2025-06-12 ASSESSMENT — PAIN SCALES - GENERAL: PAINLEVEL_OUTOF10: NO PAIN (0)

## 2025-06-12 NOTE — PATIENT INSTRUCTIONS
-- See a therapist   -- Gratitude list   -- Volunteer/help another human   -- Meet with /   -- Start Zoloft   -- Daily outdoor exercise     -- Augmentin   -- Chlorhexadine washing   -- See a dentist    Crisis Resources for Mental Health    Local:   -- dial 211: First Call for Help (https://vnjfjbgeb154.net/)    National:   -- dial or txt 988 (https://Eastide.org/)   -- txt HOME to 024099 to connect with a crisis counselor. (https://www.crisistextline.org/)    More resources:  American Psychological Association: https://www.apa.org/topics/crisis-hotlines      [place] counselors/therapists   Telephone Kids? Address   [facility] & [facility] Katherin Murphy [phone] Yes, 12+ [address]  [email or web address]  https://www.[facility].org/providers/Ania   [facility]  (Many counselors) [phone] Yes [address] [place] [address]   [email or web address]://[email or web address]   [facility]  (Many counselors) [phone] Yes [address]   [email or web address]://[email or web address]   [facility]  (Many counselors) [phone] Yes [address]  http://[email or web address].[email or web address]/   Fermin Christensen [phone] Yes [facility]  [address]  [email or web address]   Maria Antonia Em [phone] Yes [address]   [address]  [email or web address]   Millie Owusu [phone]  [address] [facility]  [email or web address]   [facility]  Taz Mora  And others... [phone] Yes [address] [facility] [address]  [email or web address]   Jackie Reaves [phone]  [address]   Kathrine Gee [phone]  [address] [place] [address]   Nallely Paulson [phone] Yes [address] [phone]  [address] [facility]   Saul Addison [phone] [address]    [facility]  Edilia Alonzo [phone]  [address]    Mirella Hamilton [phone]  [address] [place] [address] [address]  ([facility])  [email or web address]   [facility]  Tyron Freedman [phone]  [address]   [email or web address]  [email or web address]   [facility]  Michele Rinne Cindy Thomas [phone]     [facility]: Ashley [phone] Yes CHARLIE Ramirez  [address]  http://[email or web address]   [facility]: [place] [phone] Yes CHARLIE Whaley  [address] [facility]  [email or web address]

## 2025-06-12 NOTE — NURSING NOTE
"Chief Complaint   Patient presents with    Derm Problem     Rash on face    Depression     Increased and fatigue       Initial /74   Pulse 120   Temp 97.3  F (36.3  C) (Tympanic)   Resp 16   Wt 83.9 kg (185 lb)   SpO2 94%   BMI 30.79 kg/m   Estimated body mass index is 30.79 kg/m  as calculated from the following:    Height as of 3/11/25: 1.651 m (5' 5\").    Weight as of this encounter: 83.9 kg (185 lb).  Medication Review: complete    The next two questions are to help us understand your food security.  If you are feeling you need any assistance in this area, we have resources available to support you today.           No data to display                  Health Care Directive:  Patient does not have a Health Care Directive: Discussed advance care planning with patient; however, patient declined at this time.    Norma J. Gosselin, LPN      "

## 2025-06-12 NOTE — PROGRESS NOTES
Assessment & Plan       ICD-10-CM    1. Malignant neoplasm of the lip, oral cavity, and pharynx (H)  C14.8       2. History of radical neck surgery  Z98.890       3. Status post chemotherapy  Z92.21       4. History of radiation to head and neck region  Z92.3       5. Gastrostomy tube dependent (H)  Z93.1 Adult GI  Referral - Consult Only     Adult Nutrition  Referral      6. Alcohol abuse, daily use  F10.10       7. Mood disorder  F39 Adult Mental Health  Referral     sertraline (ZOLOFT) 20 MG/ML (HIGH CONC) solution      8. Moderate protein-calorie malnutrition  E44.0 Adult GI  Referral - Consult Only     Adult Nutrition  Referral      9. Lower extremity edema  R60.0       10. Copious oral secretions  R68.89 amoxicillin-clavulanate (AUGMENTIN) 400-57 MG/5ML suspension      11. Chronic kidney disease, stage 1  N18.1 Adult Nutrition  Referral        I think there is multiple things going on here with West today.  A big component of it is related to his depression.  He is very debilitated by all of the weight of the medical conditions he has been suffering with.  I strongly encouraged to meet with a therapist and a referral was provided.  Crisis resource response resources were provided.  We discussed medications options and decided on Zoloft.  Adverse effects were discussed.    I wonder if the secretions may be infected.  He describes a thick copious amount.  Certainly his salivary glands would have been affected by radiation treatments and surgery.  We decided on a trial of Augmentin.  If the oral surgeon is not able to remove his teeth long-term antibiotics may be recommended.    We did discuss the possibility of hospice he is not ready for that at this time.    I am concerned he is not getting adequate nutrition.  He is dependent on his G-tube as he is unable to swallow.  Recommend referral to gastroenterology and nutrition.  We discussed the option of lab work  and imaging and deferred for this time.  He may have ascites from alcohol use and protein malnutrition.      Patient Instructions    -- See a therapist   -- Gratitude list   -- Volunteer/help another human   -- Meet with /   -- Start Zoloft   -- Daily outdoor exercise     -- Augmentin   -- Chlorhexadine washing   -- See a dentist    Crisis Resources for Mental Health    Local:   -- dial 211: First Call for Help (https://lpgwqrevw466.net/)    National:   -- dial or txt 988 (https://Calm/)   -- txt HOME to 927371 to connect with a crisis counselor. (https://www.crisistextline.org/)    More resources:  American Psychological Association: https://www.apa.org/topics/crisis-hotlines      [place] counselors/therapists   Telephone Kids? Address   [facility] Katherin Mederos Jeffrey [phone] Yes, 12+ [address]  https://[email or web address].org/providers/Jhonatan  https://www.[facility].org/providers/Ania   [facility]  (Many counselors) [phone] Yes [address] [place] [address]   http://www.[email or web address]   [facility]  (Many counselors) [phone] Yes [address]   http://www.[facility].[email or web address]   [facility]  (Many counselors) [phone] Yes [address]  http://www.[email or web address].[email or web address]/   Fermin Christensen [phone] Yes [facility]  [address]  [email or web address]   Maria Antonia Em [phone] Yes [address]   [address]  [email or web address]   Millie Owusu [phone]  [address] [facility]  [email or web address]   [facility]  Taz Mora  And others... [phone] Yes [address] [facility] [address]  https://www.[facility].[employer]/   Jackie Degrootchristina [phone] [address] [facility] Elle Flynn [phone]  [address] [place] [address]   Nallely Paulson [phone] Yes [address] [phone]  [address] [place]    Saul Addison [phone]  [address] [facility] [address]   [facility]  Edilia Alonzo [phone]  [address] [facility]    Mirella Hamilton [phone]  [address] [place] [address] [address]  ([facility])  [email or web address]   [facility]  Tyron Freedman [phone]  [address]   [email or web address]://[email or web address].[employer]/website  [email or web address]   [facility]  Michele Rinne Cindy Thomas [phone]     [facility]: [place] [phone] Yes CHARLIE Ramirez  [address]  http://[email or web address]   [facility]: [place] [phone] Yes CHARLIE Whaley  [address]  http://[email or web address]           Return in about 1 month (around 7/12/2025), or if symptoms worsen or fail to improve, for depression.    Signed, Ariel Vincent MD, FAAP, FACP  Internal Medicine & Pediatrics    Subjective   Prasanna Alonso is a 70 year old male who presents for Derm Problem (Rash on face) and Depression (Increased and fatigue).  Sister is here with him today.  She is quite concerned about him.  He seems very depressed.  He is making a lot of thick mucus in his mouth.  His dentist wants all of his teeth removed with the oral surgeon will not remove them.  He states that it is because it would not heal.  He feels like he is not getting enough medical care.  He is very isolated.  He has a hard time communicating with others.    Objective   Vitals: /74   Pulse 120   Temp 97.3  F (36.3  C) (Tympanic)   Resp 16   Wt 83.9 kg (185 lb)   SpO2 94%   BMI 30.79 kg/m      HEENT: Some yellowish discharge is present on the rough of the hard palate.  Postsurgical changes noted.  No concerning ulceration seen.  Skin: Dry scaly rash on the face.    Review and Analysis of Data   I personally reviewed the following:  External notes: Yes [facility] including oncology and  ENT  Results: Yes local lab and imaging  Use of an independent historian: Yes I spoke with his sister  Independent review of a test performed by another physician: No  Discussion of management with another physician: No  High risk of morbidity from additional diagnostic testing and/or treatment.    Total time spent today in the preparation, care of this patient, coordination of care, and documentation meets or exceeds 45 minutes.

## 2025-06-30 ENCOUNTER — APPOINTMENT (OUTPATIENT)
Dept: GENERAL RADIOLOGY | Facility: OTHER | Age: 70
End: 2025-06-30
Attending: FAMILY MEDICINE
Payer: COMMERCIAL

## 2025-06-30 ENCOUNTER — APPOINTMENT (OUTPATIENT)
Dept: CT IMAGING | Facility: OTHER | Age: 70
End: 2025-06-30
Attending: FAMILY MEDICINE
Payer: COMMERCIAL

## 2025-06-30 ENCOUNTER — HOSPITAL ENCOUNTER (INPATIENT)
Facility: OTHER | Age: 70
End: 2025-06-30
Attending: FAMILY MEDICINE
Payer: COMMERCIAL

## 2025-06-30 DIAGNOSIS — Z87.19 HISTORY OF GASTROINTESTINAL BLEEDING: ICD-10-CM

## 2025-06-30 DIAGNOSIS — E66.09 NON MORBID OBESITY DUE TO EXCESS CALORIES: ICD-10-CM

## 2025-06-30 DIAGNOSIS — F10.10 ALCOHOL ABUSE: ICD-10-CM

## 2025-06-30 DIAGNOSIS — Z93.1 GASTROSTOMY STATUS (H): ICD-10-CM

## 2025-06-30 DIAGNOSIS — J69.0 ASPIRATION PNEUMONITIS (H): ICD-10-CM

## 2025-06-30 DIAGNOSIS — E83.42 HYPOMAGNESEMIA: ICD-10-CM

## 2025-06-30 DIAGNOSIS — F11.90 CHRONIC, CONTINUOUS USE OF OPIOIDS: ICD-10-CM

## 2025-06-30 DIAGNOSIS — R14.0 GASTRIC TYMPANY: ICD-10-CM

## 2025-06-30 DIAGNOSIS — C14.8: ICD-10-CM

## 2025-06-30 DIAGNOSIS — R10.9 ABDOMINAL DISCOMFORT: ICD-10-CM

## 2025-06-30 DIAGNOSIS — G47.00 PERSISTENT INSOMNIA: ICD-10-CM

## 2025-06-30 DIAGNOSIS — R53.1 ASTHENIA: ICD-10-CM

## 2025-06-30 DIAGNOSIS — G89.4 CHRONIC PAIN SYNDROME: ICD-10-CM

## 2025-06-30 DIAGNOSIS — R09.02 HYPOXEMIA: ICD-10-CM

## 2025-06-30 DIAGNOSIS — Z87.891 PERSONAL HISTORY OF TOBACCO USE, PRESENTING HAZARDS TO HEALTH: Primary | ICD-10-CM

## 2025-06-30 DIAGNOSIS — F39 MOOD DISORDER: ICD-10-CM

## 2025-06-30 PROBLEM — J18.9 RIGHT MIDDLE LOBE PNEUMONIA: Status: ACTIVE | Noted: 2025-06-30

## 2025-06-30 LAB
ALBUMIN SERPL BCG-MCNC: 3.4 G/DL (ref 3.5–5.2)
ALP SERPL-CCNC: 76 U/L (ref 40–150)
ALT SERPL W P-5'-P-CCNC: 40 U/L (ref 0–70)
AMMONIA PLAS-SCNC: 21 UMOL/L (ref 16–60)
ANION GAP SERPL CALCULATED.3IONS-SCNC: 16 MMOL/L (ref 7–15)
AST SERPL W P-5'-P-CCNC: 94 U/L (ref 0–45)
ATRIAL RATE - MUSE: 101 BPM
BASE EXCESS BLDV CALC-SCNC: 1.3 MMOL/L (ref -3–3)
BASOPHILS # BLD AUTO: 0.1 10E3/UL (ref 0–0.2)
BASOPHILS NFR BLD AUTO: 1 %
BILIRUB SERPL-MCNC: 0.8 MG/DL
BUN SERPL-MCNC: 14.5 MG/DL (ref 8–23)
CALCIUM SERPL-MCNC: 9.5 MG/DL (ref 8.8–10.4)
CHLORIDE SERPL-SCNC: 101 MMOL/L (ref 98–107)
CREAT SERPL-MCNC: 0.89 MG/DL (ref 0.67–1.17)
DIASTOLIC BLOOD PRESSURE - MUSE: NORMAL MMHG
EGFRCR SERPLBLD CKD-EPI 2021: >90 ML/MIN/1.73M2
EOSINOPHIL # BLD AUTO: 0 10E3/UL (ref 0–0.7)
EOSINOPHIL NFR BLD AUTO: 1 %
ERYTHROCYTE [DISTWIDTH] IN BLOOD BY AUTOMATED COUNT: 14.2 % (ref 10–15)
GLUCOSE SERPL-MCNC: 123 MG/DL (ref 70–99)
HCO3 BLDV-SCNC: 27 MMOL/L (ref 21–28)
HCO3 SERPL-SCNC: 24 MMOL/L (ref 22–29)
HCT VFR BLD AUTO: 41.4 % (ref 40–53)
HGB BLD-MCNC: 14.1 G/DL (ref 13.3–17.7)
HOLD SPECIMEN: NORMAL
HOLD SPECIMEN: NORMAL
IMM GRANULOCYTES # BLD: 0 10E3/UL
IMM GRANULOCYTES NFR BLD: 0 %
INTERPRETATION ECG - MUSE: NORMAL
LYMPHOCYTES # BLD AUTO: 0.5 10E3/UL (ref 0.8–5.3)
LYMPHOCYTES NFR BLD AUTO: 10 %
MCH RBC QN AUTO: 37 PG (ref 26.5–33)
MCHC RBC AUTO-ENTMCNC: 34.1 G/DL (ref 31.5–36.5)
MCV RBC AUTO: 109 FL (ref 78–100)
MONOCYTES # BLD AUTO: 0.7 10E3/UL (ref 0–1.3)
MONOCYTES NFR BLD AUTO: 15 %
NEUTROPHILS # BLD AUTO: 3.6 10E3/UL (ref 1.6–8.3)
NEUTROPHILS NFR BLD AUTO: 74 %
NRBC # BLD AUTO: 0 10E3/UL
NRBC BLD AUTO-RTO: 0 /100
NT-PROBNP SERPL-MCNC: 38 PG/ML (ref 0–229)
O2/TOTAL GAS SETTING VFR VENT: 37 %
OXYHGB MFR BLDV: 93 % (ref 70–75)
P AXIS - MUSE: 28 DEGREES
PCO2 BLDV: 45 MM HG (ref 40–50)
PH BLDV: 7.39 [PH] (ref 7.32–7.43)
PLATELET # BLD AUTO: 139 10E3/UL (ref 150–450)
PO2 BLDV: 79 MM HG (ref 25–47)
POTASSIUM SERPL-SCNC: 3.7 MMOL/L (ref 3.4–5.3)
PR INTERVAL - MUSE: 150 MS
PROT SERPL-MCNC: 6.8 G/DL (ref 6.4–8.3)
QRS DURATION - MUSE: 76 MS
QT - MUSE: 328 MS
QTC - MUSE: 425 MS
R AXIS - MUSE: -46 DEGREES
RBC # BLD AUTO: 3.81 10E6/UL (ref 4.4–5.9)
SAO2 % BLDV: 94.7 % (ref 70–75)
SODIUM SERPL-SCNC: 141 MMOL/L (ref 135–145)
SYSTOLIC BLOOD PRESSURE - MUSE: NORMAL MMHG
T AXIS - MUSE: 30 DEGREES
T4 FREE SERPL-MCNC: 0.99 NG/DL (ref 0.9–1.7)
TROPONIN T SERPL HS-MCNC: 16 NG/L
TSH SERPL DL<=0.005 MIU/L-ACNC: 9.7 UIU/ML (ref 0.3–4.2)
VENTRICULAR RATE- MUSE: 101 BPM
WBC # BLD AUTO: 4.9 10E3/UL (ref 4–11)

## 2025-06-30 PROCEDURE — 36415 COLL VENOUS BLD VENIPUNCTURE: CPT | Performed by: FAMILY MEDICINE

## 2025-06-30 PROCEDURE — 82140 ASSAY OF AMMONIA: CPT | Performed by: FAMILY MEDICINE

## 2025-06-30 PROCEDURE — 250N000009 HC RX 250: Performed by: FAMILY MEDICINE

## 2025-06-30 PROCEDURE — 71260 CT THORAX DX C+: CPT | Mod: 26 | Performed by: RADIOLOGY

## 2025-06-30 PROCEDURE — 999N000157 HC STATISTIC RCP TIME EA 10 MIN

## 2025-06-30 PROCEDURE — 71260 CT THORAX DX C+: CPT

## 2025-06-30 PROCEDURE — 84439 ASSAY OF FREE THYROXINE: CPT | Performed by: FAMILY MEDICINE

## 2025-06-30 PROCEDURE — 80053 COMPREHEN METABOLIC PANEL: CPT | Performed by: FAMILY MEDICINE

## 2025-06-30 PROCEDURE — 71045 X-RAY EXAM CHEST 1 VIEW: CPT | Mod: 26 | Performed by: INTERNAL MEDICINE

## 2025-06-30 PROCEDURE — 99285 EMERGENCY DEPT VISIT HI MDM: CPT | Mod: 25 | Performed by: FAMILY MEDICINE

## 2025-06-30 PROCEDURE — 93010 ELECTROCARDIOGRAM REPORT: CPT | Performed by: INTERNAL MEDICINE

## 2025-06-30 PROCEDURE — 93005 ELECTROCARDIOGRAM TRACING: CPT | Performed by: FAMILY MEDICINE

## 2025-06-30 PROCEDURE — 82805 BLOOD GASES W/O2 SATURATION: CPT | Performed by: FAMILY MEDICINE

## 2025-06-30 PROCEDURE — 83735 ASSAY OF MAGNESIUM: CPT | Performed by: INTERNAL MEDICINE

## 2025-06-30 PROCEDURE — 250N000011 HC RX IP 250 OP 636: Performed by: FAMILY MEDICINE

## 2025-06-30 PROCEDURE — 84443 ASSAY THYROID STIM HORMONE: CPT | Performed by: FAMILY MEDICINE

## 2025-06-30 PROCEDURE — 94640 AIRWAY INHALATION TREATMENT: CPT

## 2025-06-30 PROCEDURE — 85004 AUTOMATED DIFF WBC COUNT: CPT | Performed by: FAMILY MEDICINE

## 2025-06-30 PROCEDURE — 84100 ASSAY OF PHOSPHORUS: CPT | Performed by: INTERNAL MEDICINE

## 2025-06-30 PROCEDURE — 83880 ASSAY OF NATRIURETIC PEPTIDE: CPT | Performed by: FAMILY MEDICINE

## 2025-06-30 PROCEDURE — 84484 ASSAY OF TROPONIN QUANT: CPT | Performed by: FAMILY MEDICINE

## 2025-06-30 PROCEDURE — 71045 X-RAY EXAM CHEST 1 VIEW: CPT

## 2025-06-30 PROCEDURE — 120N000001 HC R&B MED SURG/OB

## 2025-06-30 PROCEDURE — 74177 CT ABD & PELVIS W/CONTRAST: CPT | Mod: 26 | Performed by: RADIOLOGY

## 2025-06-30 PROCEDURE — 258N000003 HC RX IP 258 OP 636: Performed by: FAMILY MEDICINE

## 2025-06-30 PROCEDURE — 99284 EMERGENCY DEPT VISIT MOD MDM: CPT | Performed by: FAMILY MEDICINE

## 2025-06-30 PROCEDURE — 96361 HYDRATE IV INFUSION ADD-ON: CPT | Performed by: FAMILY MEDICINE

## 2025-06-30 RX ORDER — IOPAMIDOL 755 MG/ML
107 INJECTION, SOLUTION INTRAVASCULAR ONCE
Status: COMPLETED | OUTPATIENT
Start: 2025-06-30 | End: 2025-06-30

## 2025-06-30 RX ORDER — GUAIFENESIN 600 MG/1
15 TABLET, EXTENDED RELEASE ORAL DAILY
Status: DISPENSED | OUTPATIENT
Start: 2025-07-01

## 2025-06-30 RX ORDER — DEXTROSE MONOHYDRATE 100 MG/ML
INJECTION, SOLUTION INTRAVENOUS CONTINUOUS PRN
Status: ACTIVE | OUTPATIENT
Start: 2025-06-30

## 2025-06-30 RX ORDER — IPRATROPIUM BROMIDE AND ALBUTEROL SULFATE 2.5; .5 MG/3ML; MG/3ML
3 SOLUTION RESPIRATORY (INHALATION)
Status: ACTIVE | OUTPATIENT
Start: 2025-06-30 | End: 2025-06-30

## 2025-06-30 RX ADMIN — IOPAMIDOL 107 ML: 755 INJECTION, SOLUTION INTRAVENOUS at 20:41

## 2025-06-30 RX ADMIN — IPRATROPIUM BROMIDE AND ALBUTEROL SULFATE 3 ML: .5; 3 SOLUTION RESPIRATORY (INHALATION) at 17:14

## 2025-06-30 RX ADMIN — IPRATROPIUM BROMIDE AND ALBUTEROL SULFATE 3 ML: .5; 3 SOLUTION RESPIRATORY (INHALATION) at 17:37

## 2025-06-30 RX ADMIN — SODIUM CHLORIDE 60 ML: 9 INJECTION, SOLUTION INTRAVENOUS at 20:41

## 2025-06-30 RX ADMIN — SODIUM CHLORIDE 1000 ML: 0.9 INJECTION, SOLUTION INTRAVENOUS at 20:59

## 2025-06-30 ASSESSMENT — ENCOUNTER SYMPTOMS
ABDOMINAL PAIN: 0
SHORTNESS OF BREATH: 1
ABDOMINAL DISTENTION: 1
COUGH: 0

## 2025-06-30 ASSESSMENT — ACTIVITIES OF DAILY LIVING (ADL)
ADLS_ACUITY_SCORE: 45

## 2025-06-30 ASSESSMENT — COLUMBIA-SUICIDE SEVERITY RATING SCALE - C-SSRS
1. IN THE PAST MONTH, HAVE YOU WISHED YOU WERE DEAD OR WISHED YOU COULD GO TO SLEEP AND NOT WAKE UP?: NO
2. HAVE YOU ACTUALLY HAD ANY THOUGHTS OF KILLING YOURSELF IN THE PAST MONTH?: NO
6. HAVE YOU EVER DONE ANYTHING, STARTED TO DO ANYTHING, OR PREPARED TO DO ANYTHING TO END YOUR LIFE?: NO

## 2025-06-30 NOTE — ED PROVIDER NOTES
History     Chief Complaint   Patient presents with    Shortness of Breath    Generalized Weakness     HPI  Prasanna Alonso is a 70 year old male who presents to emergency department with shortness of breath, abdominal distention, increasing generalized weakness, deteriorating state in general per the sister.  No chest pain, no change in his opioid regimen recently, he has been on that for 4 years.      Reviewed RN notes below, same history relayed to me    Prasanna Alonso is a 70 year old Male that presents to triage ambulance  with history of  generalized weakness, SOB reported by patient . Patient has been weak for a while now but has been getting worse the last few days and has been falling. The patient needed oxygen which is abnormal for the patient. EMS stated he was 82% on room air. Interventions prior to arrival include 400mL of NS and oxygen applied by EMS .   Allergies:  No Known Allergies    Problem List:    Patient Active Problem List    Diagnosis Date Noted    Chronic bilateral low back pain with bilateral sciatica 03/11/2025     Priority: Medium    Chronic pain syndrome 03/11/2025     Priority: Medium    Non morbid obesity due to excess calories 03/11/2025     Priority: Medium    Elevated glucose 03/11/2025     Priority: Medium    Gastrostomy tube dependent (H) 03/11/2025     Priority: Medium    Elevated liver enzymes 03/11/2025     Priority: Medium    History of radiation to head and neck region 03/11/2025     Priority: Medium    Status post chemotherapy 03/11/2025     Priority: Medium    History of radical neck surgery 03/11/2025     Priority: Medium    Chronic, continuous use of opioids 10/24/2022     Priority: Medium    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  Formatting of this note might be different from  the original.  Added automatically from request for surgery [id number]  Formatting of this note might be different from the original.  Added automatically from request for surgery [id number]      History of gastrointestinal bleeding 04/13/2021     Priority: Medium    Epidermoid cyst of face 06/09/2020     Priority: Medium    Primary osteoarthritis involving multiple joints 06/09/2020     Priority: Medium    GI bleed 07/04/2019     Priority: Medium    Alcoholic peripheral neuropathy 10/20/2015     Priority: Medium    Tobacco use disorder 09/17/2012     Priority: Medium    Alcohol abuse, daily use 06/13/2011     Priority: Medium     Overview:   chemical dependency          Past Medical History:    Past Medical History:   Diagnosis Date    Allergy     Anxiety     Depressive disorder     Other psychoactive substance dependence, uncomplicated (H)     Substance abuse (H)     Superficial frostbite        Past Surgical History:    Past Surgical History:   Procedure Laterality Date    COLONOSCOPY N/A 7/8/2019    2 tubular adenomas, gastritis and duodenitis. H Pylori was negative.    ESOPHAGOSCOPY, GASTROSCOPY, DUODENOSCOPY (EGD), COMBINED N/A 7/8/2019    Procedure: ESOPHAGOGASTRODUODENOSCOPY, WITH BIOPSY;  Surgeon: Adalberto Cortes MD;  Location:  OR    OTHER SURGICAL HISTORY      2011,600000,OTHER       Family History:    Family History   Problem Relation Age of Onset    No Known Problems Other         No history of GI bleeding    No Known Problems Mother     Unknown/Adopted Father         disappeared when [place] was a kid    No Known Problems Sister     No Known Problems Brother        Social History:  Marital Status:  Single [1]  Social History     Tobacco Use    Smoking status: Former     Current packs/day: 1.00     Average packs/day: 1 pack/day for 50.0 years (50.0 ttl pk-yrs)     Types: Cigarettes    Smokeless tobacco: Never   Vaping Use    Vaping status: Never Used   Substance Use Topics    Alcohol use: Not Currently      Alcohol/week: 42.0 standard drinks of alcohol     Types: 42 Standard drinks or equivalent per week     Comment: Alcoholic Drinks/day: 4-6 oz  daily    Drug use: Never        Medications:    chlorhexidine (PERIDEX) 0.12 % solution  famotidine (PEPCID) 20 MG tablet  fentaNYL (DURAGESIC) 25 mcg/hr 72 hr patch  ibuprofen (ADVIL/MOTRIN) 100 MG/5ML suspension  multivitamin w/minerals (THERA-VIT-M) tablet  oxyCODONE IR (ROXICODONE) 10 MG tablet  sertraline (ZOLOFT) 20 MG/ML (HIGH CONC) solution          Review of Systems   HENT:  Positive for congestion.    Respiratory:  Positive for shortness of breath. Negative for cough.    Gastrointestinal:  Positive for abdominal distention. Negative for abdominal pain.       Physical Exam   BP: 123/76  Pulse: 99  Temp: 97.1  F (36.2  C)  Resp: 16  Weight: 83.9 kg (185 lb)  SpO2: 92 %      Physical Exam  Vitals and nursing note reviewed.   HENT:      Head: Normocephalic.   Cardiovascular:      Rate and Rhythm: Normal rate.   Pulmonary:      Effort: Pulmonary effort is normal.      Breath sounds: No wheezing.   Chest:      Chest wall: No tenderness.   Abdominal:      Tenderness: There is no abdominal tenderness.   Musculoskeletal:      Right lower leg: No edema.   Neurological:      Mental Status: He is alert.       Abdomen distended but nontender.  No localized peritonitis        Recent Results (from the past 24 hours)   Extra Tube    Narrative    The following orders were created for panel order Extra Tube.  Procedure                               Abnormality         Status                     ---------                               -----------         ------                     Extra Blue Top Tube[[id number]]                             Final result               Extra Red Top Tube[[id number]]                              Final result                 Please view results for these tests on the individual orders.   Extra Blue Top Tube   Result Value Ref Range    Hold Specimen x     Extra Red Top Tube   Result Value Ref Range    Hold Specimen x    CBC with platelets differential    Narrative    The following orders were created for panel order CBC with platelets differential.  Procedure                               Abnormality         Status                     ---------                               -----------         ------                     CBC with platelets and ...[[id number]]  Abnormal            Final result                 Please view results for these tests on the individual orders.   Comprehensive metabolic panel   Result Value Ref Range    Sodium 141 135 - 145 mmol/L    Potassium 3.7 3.4 - 5.3 mmol/L    Carbon Dioxide (CO2) 24 22 - 29 mmol/L    Anion Gap 16 (H) 7 - 15 mmol/L    Urea Nitrogen 14.5 8.0 - 23.0 mg/dL    Creatinine 0.89 0.67 - 1.17 mg/dL    GFR Estimate >90 >60 mL/min/1.73m2    Calcium 9.5 8.8 - 10.4 mg/dL    Chloride 101 98 - 107 mmol/L    Glucose 123 (H) 70 - 99 mg/dL    Alkaline Phosphatase 76 40 - 150 U/L    AST 94 (H) 0 - 45 U/L    ALT 40 0 - 70 U/L    Protein Total 6.8 6.4 - 8.3 g/dL    Albumin 3.4 (L) 3.5 - 5.2 g/dL    Bilirubin Total 0.8 <=1.2 mg/dL   Troponin T, High Sensitivity   Result Value Ref Range    Troponin T, High Sensitivity 16 <=22 ng/L   NT-proBNP   Result Value Ref Range    NT-proBNP 38 0 - 229 pg/mL   Ammonia   Result Value Ref Range    Ammonia 21 16 - 60 umol/L   Blood gas venous   Result Value Ref Range    pH Venous 7.39 7.32 - 7.43    pCO2 Venous 45 40 - 50 mm Hg    pO2 Venous 79 (H) 25 - 47 mm Hg    Bicarbonate Venous 27 21 - 28 mmol/L    Base Excess/Deficit Venous 1.3 -3.0 - 3.0 mmol/L    FIO2 37     Oxyhemoglobin Venous 93 (H) 70 - 75 %    O2 Sat, Venous 94.7 (H) 70.0 - 75.0 %    Narrative    In healthy individuals, oxyhemoglobin (O2Hb) and oxygen saturation (SO2) are approximately equal. In the presence of dyshemoglobins, oxyhemoglobin can be considerably lower than oxygen saturation.   CBC with platelets and differential   Result Value  Ref Range    WBC Count 4.9 4.0 - 11.0 10e3/uL    RBC Count 3.81 (L) 4.40 - 5.90 10e6/uL    Hemoglobin 14.1 13.3 - 17.7 g/dL    Hematocrit 41.4 40.0 - 53.0 %     (H) 78 - 100 fL    MCH 37.0 (H) 26.5 - 33.0 pg    MCHC 34.1 31.5 - 36.5 g/dL    RDW 14.2 10.0 - 15.0 %    Platelet Count 139 (L) 150 - 450 10e3/uL    % Neutrophils 74 %    % Lymphocytes 10 %    % Monocytes 15 %    % Eosinophils 1 %    % Basophils 1 %    % Immature Granulocytes 0 %    NRBCs per 100 WBC 0 <1 /100    Absolute Neutrophils 3.6 1.6 - 8.3 10e3/uL    Absolute Lymphocytes 0.5 (L) 0.8 - 5.3 10e3/uL    Absolute Monocytes 0.7 0.0 - 1.3 10e3/uL    Absolute Eosinophils 0.0 0.0 - 0.7 10e3/uL    Absolute Basophils 0.1 0.0 - 0.2 10e3/uL    Absolute Immature Granulocytes 0.0 <=0.4 10e3/uL    Absolute NRBCs 0.0 10e3/uL   XR Chest Port 1 View    Narrative    EXAM: XR CHEST PORT 1 VIEW  LOCATION: [facility]  DATE: 6/30/2025    INDICATION: Shortness of breath  COMPARISON: CT chest 06/23/2020.      Impression    IMPRESSION: Cardiac silhouette is within normal limits. Aortic arch calcification. Linear/patchy opacities of the medial right lung base may represent atelectasis or developing infection depending on the clinical context. Asymmetric elevation the right   hemidiaphragm. No pleural effusion or discernible pneumothorax.       Medications   ipratropium - albuterol 0.5 mg/2.5 mg (3mg)/3 mL (DUONEB) neb solution 3 mL (3 mLs Nebulization $Given 6/30/25 6610)       Assessments & Plan (with Medical Decision Making)     I have reviewed the nursing notes.    I have reviewed the findings, diagnosis, plan and need for follow up with the patient.    5:57 PM--patient reevaluated, did not really feel improved after the nebulizers.  States that abdominal distention is also significant complaint today.    CT indication as follows cough, congestion, abnormal chest x-ray, hypoxemia, please rule out occult pneumonia. Also patient with worsening  abdominal distention and tenderness, patient has indwelling PEG tube, rule out obstruction please     Is my clinical impression that he may be overtreated with his chronic opioids and that may be contributing to his hypoxemia.  No clear evidence of wheezing or asthma exacerbation.  Chest x-ray is borderline for occult infection.  Could have had aspiration event.  Patient also quite distended in his abdomen, he has a chronic indwelling G-tube, given the decreased ability to infuse liquids through this he may be obstructed.  No vomiting but patient states he does not really vomit normally.  I will order CT chest to rule out occult pneumonia and order CT abdomen to rule out obstruction.    I will need to sign out to Dr. Bolaños at shift change pending further workup.  Patient and family aware of shift change delay    New Prescriptions    No medications on file       Final diagnoses:   Hypoxemia   Abdominal discomfort       6/30/2025   [facility]       Deon Bass MD  06/30/25 2724

## 2025-06-30 NOTE — ED TRIAGE NOTES
ED Nursing Triage Note (General)   ________________________________    Prasanna Alonso is a 70 year old Male that presents to triage ambulance  with history of  generalized weakness, SOB reported by patient . Patient has been weak for a while now but has been getting worse the last few days and has been falling. The patient needed oxygen which is abnormal for the patient. EMS stated he was 82% on room air. Interventions prior to arrival include 400mL of NS and oxygen applied by EMS .  /76   Pulse 99   Temp 97.1  F (36.2  C) (Tympanic)   Resp 16   Wt 83.9 kg (185 lb)   SpO2 92%   BMI 30.79 kg/m  t  Patient appears alert  and oriented    GCS Total = 15    Action taken:  roomed      PRE HOSPITAL PRIOR LIVING SITUATION Alone       Triage Assessment (Adult)       Row Name 06/30/25 8118          Triage Assessment    Airway WDL WDL        Respiratory WDL    Respiratory WDL WDL        Skin Circulation/Temperature WDL    Skin Circulation/Temperature WDL WDL        Cardiac WDL    Cardiac WDL WDL        Peripheral/Neurovascular WDL    Peripheral Neurovascular WDL WDL        Cognitive/Neuro/Behavioral WDL    Cognitive/Neuro/Behavioral WDL speech;X     Orientation oriented x 4     Speech slurred        [place] Coma Scale    Best Eye Response 4-->(E4) spontaneous     Best Motor Response 6-->(M6) obeys commands     Best Verbal Response 5-->(V5) oriented     [place] Coma Scale Score 15

## 2025-07-01 ENCOUNTER — APPOINTMENT (OUTPATIENT)
Dept: PHYSICAL THERAPY | Facility: OTHER | Age: 70
End: 2025-07-01
Attending: STUDENT IN AN ORGANIZED HEALTH CARE EDUCATION/TRAINING PROGRAM
Payer: COMMERCIAL

## 2025-07-01 ENCOUNTER — APPOINTMENT (OUTPATIENT)
Dept: OCCUPATIONAL THERAPY | Facility: OTHER | Age: 70
End: 2025-07-01
Attending: STUDENT IN AN ORGANIZED HEALTH CARE EDUCATION/TRAINING PROGRAM
Payer: COMMERCIAL

## 2025-07-01 LAB
ALBUMIN SERPL BCG-MCNC: 3.1 G/DL (ref 3.5–5.2)
ALBUMIN UR-MCNC: NEGATIVE MG/DL
ALP SERPL-CCNC: 72 U/L (ref 40–150)
ALT SERPL W P-5'-P-CCNC: 37 U/L (ref 0–70)
ANION GAP SERPL CALCULATED.3IONS-SCNC: 17 MMOL/L (ref 7–15)
APPEARANCE UR: CLEAR
AST SERPL W P-5'-P-CCNC: 83 U/L (ref 0–45)
BILIRUB SERPL-MCNC: 0.7 MG/DL
BILIRUB UR QL STRIP: NEGATIVE
BUN SERPL-MCNC: 12.7 MG/DL (ref 8–23)
CALCIUM SERPL-MCNC: 8.9 MG/DL (ref 8.8–10.4)
CHLORIDE SERPL-SCNC: 103 MMOL/L (ref 98–107)
CK SERPL-CCNC: 96 U/L (ref 39–308)
COLOR UR AUTO: ABNORMAL
CREAT SERPL-MCNC: 0.71 MG/DL (ref 0.67–1.17)
EGFRCR SERPLBLD CKD-EPI 2021: >90 ML/MIN/1.73M2
ERYTHROCYTE [DISTWIDTH] IN BLOOD BY AUTOMATED COUNT: 14.1 % (ref 10–15)
GLUCOSE BLDC GLUCOMTR-MCNC: 128 MG/DL (ref 70–99)
GLUCOSE BLDC GLUCOMTR-MCNC: 150 MG/DL (ref 70–99)
GLUCOSE BLDC GLUCOMTR-MCNC: 247 MG/DL (ref 70–99)
GLUCOSE SERPL-MCNC: 122 MG/DL (ref 70–99)
GLUCOSE UR STRIP-MCNC: NEGATIVE MG/DL
HCO3 SERPL-SCNC: 23 MMOL/L (ref 22–29)
HCT VFR BLD AUTO: 39.5 % (ref 40–53)
HGB BLD-MCNC: 13.1 G/DL (ref 13.3–17.7)
HGB UR QL STRIP: NEGATIVE
HYALINE CASTS: 1 /LPF
KETONES UR STRIP-MCNC: 10 MG/DL
LEUKOCYTE ESTERASE UR QL STRIP: NEGATIVE
MAGNESIUM SERPL-MCNC: 1.2 MG/DL (ref 1.7–2.3)
MAGNESIUM SERPL-MCNC: 1.4 MG/DL (ref 1.7–2.3)
MAGNESIUM SERPL-MCNC: 2.1 MG/DL (ref 1.7–2.3)
MCH RBC QN AUTO: 36.1 PG (ref 26.5–33)
MCHC RBC AUTO-ENTMCNC: 33.2 G/DL (ref 31.5–36.5)
MCV RBC AUTO: 109 FL (ref 78–100)
MUCOUS THREADS #/AREA URNS LPF: PRESENT /LPF
NITRATE UR QL: NEGATIVE
PH UR STRIP: 7.5 [PH] (ref 5–9)
PHOSPHATE SERPL-MCNC: 2.9 MG/DL (ref 2.5–4.5)
PLATELET # BLD AUTO: 118 10E3/UL (ref 150–450)
POTASSIUM SERPL-SCNC: 3.7 MMOL/L (ref 3.4–5.3)
PROT SERPL-MCNC: 6.6 G/DL (ref 6.4–8.3)
RBC # BLD AUTO: 3.63 10E6/UL (ref 4.4–5.9)
RBC URINE: <1 /HPF
SODIUM SERPL-SCNC: 143 MMOL/L (ref 135–145)
SP GR UR STRIP: 1.02 (ref 1–1.03)
UROBILINOGEN UR STRIP-MCNC: NORMAL MG/DL
WBC # BLD AUTO: 5.3 10E3/UL (ref 4–11)
WBC URINE: 1 /HPF

## 2025-07-01 PROCEDURE — 82550 ASSAY OF CK (CPK): CPT | Performed by: STUDENT IN AN ORGANIZED HEALTH CARE EDUCATION/TRAINING PROGRAM

## 2025-07-01 PROCEDURE — 81001 URINALYSIS AUTO W/SCOPE: CPT | Performed by: STUDENT IN AN ORGANIZED HEALTH CARE EDUCATION/TRAINING PROGRAM

## 2025-07-01 PROCEDURE — 250N000011 HC RX IP 250 OP 636: Performed by: FAMILY MEDICINE

## 2025-07-01 PROCEDURE — 250N000013 HC RX MED GY IP 250 OP 250 PS 637: Performed by: STUDENT IN AN ORGANIZED HEALTH CARE EDUCATION/TRAINING PROGRAM

## 2025-07-01 PROCEDURE — 250N000011 HC RX IP 250 OP 636: Performed by: INTERNAL MEDICINE

## 2025-07-01 PROCEDURE — 120N000001 HC R&B MED SURG/OB

## 2025-07-01 PROCEDURE — 250N000011 HC RX IP 250 OP 636: Performed by: STUDENT IN AN ORGANIZED HEALTH CARE EDUCATION/TRAINING PROGRAM

## 2025-07-01 PROCEDURE — 96374 THER/PROPH/DIAG INJ IV PUSH: CPT | Performed by: FAMILY MEDICINE

## 2025-07-01 PROCEDURE — 36415 COLL VENOUS BLD VENIPUNCTURE: CPT | Performed by: STUDENT IN AN ORGANIZED HEALTH CARE EDUCATION/TRAINING PROGRAM

## 2025-07-01 PROCEDURE — 97530 THERAPEUTIC ACTIVITIES: CPT | Mod: GO | Performed by: OCCUPATIONAL THERAPIST

## 2025-07-01 PROCEDURE — 250N000013 HC RX MED GY IP 250 OP 250 PS 637: Performed by: INTERNAL MEDICINE

## 2025-07-01 PROCEDURE — 36415 COLL VENOUS BLD VENIPUNCTURE: CPT | Performed by: INTERNAL MEDICINE

## 2025-07-01 PROCEDURE — 80053 COMPREHEN METABOLIC PANEL: CPT | Performed by: INTERNAL MEDICINE

## 2025-07-01 PROCEDURE — 97165 OT EVAL LOW COMPLEX 30 MIN: CPT | Mod: GO | Performed by: OCCUPATIONAL THERAPIST

## 2025-07-01 PROCEDURE — 85014 HEMATOCRIT: CPT | Performed by: INTERNAL MEDICINE

## 2025-07-01 PROCEDURE — 97116 GAIT TRAINING THERAPY: CPT | Mod: GP

## 2025-07-01 PROCEDURE — 97161 PT EVAL LOW COMPLEX 20 MIN: CPT | Mod: GP

## 2025-07-01 PROCEDURE — 83735 ASSAY OF MAGNESIUM: CPT | Performed by: STUDENT IN AN ORGANIZED HEALTH CARE EDUCATION/TRAINING PROGRAM

## 2025-07-01 PROCEDURE — 87040 BLOOD CULTURE FOR BACTERIA: CPT | Performed by: INTERNAL MEDICINE

## 2025-07-01 PROCEDURE — 258N000003 HC RX IP 258 OP 636: Performed by: INTERNAL MEDICINE

## 2025-07-01 RX ORDER — ONDANSETRON 2 MG/ML
4 INJECTION INTRAMUSCULAR; INTRAVENOUS EVERY 6 HOURS PRN
Status: ACTIVE | OUTPATIENT
Start: 2025-07-01

## 2025-07-01 RX ORDER — HYDRALAZINE HYDROCHLORIDE 20 MG/ML
10 INJECTION INTRAMUSCULAR; INTRAVENOUS EVERY 4 HOURS PRN
Status: ACTIVE | OUTPATIENT
Start: 2025-07-01

## 2025-07-01 RX ORDER — MAGNESIUM SULFATE HEPTAHYDRATE 40 MG/ML
4 INJECTION, SOLUTION INTRAVENOUS ONCE
Status: DISCONTINUED | OUTPATIENT
Start: 2025-07-01 | End: 2025-07-01

## 2025-07-01 RX ORDER — CLONIDINE HYDROCHLORIDE 0.1 MG/1
0.1 TABLET ORAL EVERY 8 HOURS
Status: DISPENSED | OUTPATIENT
Start: 2025-07-01

## 2025-07-01 RX ORDER — FAMOTIDINE 20 MG/1
20 TABLET, FILM COATED ORAL 2 TIMES DAILY
Status: DISPENSED | OUTPATIENT
Start: 2025-07-01

## 2025-07-01 RX ORDER — ACETAMINOPHEN 650 MG/1
650 SUPPOSITORY RECTAL EVERY 4 HOURS PRN
Status: ACTIVE | OUTPATIENT
Start: 2025-07-01

## 2025-07-01 RX ORDER — ALBUTEROL SULFATE 0.83 MG/ML
3 SOLUTION RESPIRATORY (INHALATION)
Status: ACTIVE | OUTPATIENT
Start: 2025-07-01

## 2025-07-01 RX ORDER — NALOXONE HYDROCHLORIDE 0.4 MG/ML
0.2 INJECTION, SOLUTION INTRAMUSCULAR; INTRAVENOUS; SUBCUTANEOUS
Status: ACTIVE | OUTPATIENT
Start: 2025-07-01

## 2025-07-01 RX ORDER — IBUPROFEN 100 MG/5ML
400 SUSPENSION ORAL EVERY 6 HOURS PRN
Status: DISPENSED | OUTPATIENT
Start: 2025-07-01

## 2025-07-01 RX ORDER — CALCIUM CARBONATE 500 MG/1
1000 TABLET, CHEWABLE ORAL 4 TIMES DAILY PRN
Status: ACTIVE | OUTPATIENT
Start: 2025-07-01

## 2025-07-01 RX ORDER — DIAZEPAM 10 MG/2ML
5-10 INJECTION, SOLUTION INTRAMUSCULAR; INTRAVENOUS EVERY 30 MIN PRN
Status: DISPENSED | OUTPATIENT
Start: 2025-07-01

## 2025-07-01 RX ORDER — SERTRALINE HYDROCHLORIDE 25 MG/1
25 TABLET, FILM COATED ORAL DAILY
Status: DISPENSED | OUTPATIENT
Start: 2025-07-01

## 2025-07-01 RX ORDER — CHLORHEXIDINE GLUCONATE ORAL RINSE 1.2 MG/ML
15 SOLUTION DENTAL 2 TIMES DAILY
Status: DISPENSED | OUTPATIENT
Start: 2025-07-01

## 2025-07-01 RX ORDER — FENTANYL 25 UG/1
25 PATCH TRANSDERMAL
Refills: 0 | Status: DISPENSED | OUTPATIENT
Start: 2025-07-01

## 2025-07-01 RX ORDER — AMOXICILLIN 250 MG
1 CAPSULE ORAL 2 TIMES DAILY PRN
Status: DISCONTINUED | OUTPATIENT
Start: 2025-07-01 | End: 2025-07-01

## 2025-07-01 RX ORDER — MULTIPLE VITAMINS W/ MINERALS TAB 9MG-400MCG
1 TAB ORAL DAILY
Status: DISCONTINUED | OUTPATIENT
Start: 2025-07-01 | End: 2025-07-01

## 2025-07-01 RX ORDER — CALCIUM CARBONATE 500 MG/1
1000 TABLET, CHEWABLE ORAL 4 TIMES DAILY PRN
Status: DISCONTINUED | OUTPATIENT
Start: 2025-07-01 | End: 2025-07-01

## 2025-07-01 RX ORDER — SODIUM CHLORIDE, SODIUM LACTATE, POTASSIUM CHLORIDE, CALCIUM CHLORIDE 600; 310; 30; 20 MG/100ML; MG/100ML; MG/100ML; MG/100ML
INJECTION, SOLUTION INTRAVENOUS CONTINUOUS
Status: DISCONTINUED | OUTPATIENT
Start: 2025-07-01 | End: 2025-07-02

## 2025-07-01 RX ORDER — MAGNESIUM SULFATE HEPTAHYDRATE 40 MG/ML
4 INJECTION, SOLUTION INTRAVENOUS ONCE
Status: COMPLETED | OUTPATIENT
Start: 2025-07-01 | End: 2025-07-01

## 2025-07-01 RX ORDER — DIAZEPAM 5 MG/1
10 TABLET ORAL EVERY 30 MIN PRN
Status: DISPENSED | OUTPATIENT
Start: 2025-07-01

## 2025-07-01 RX ORDER — AMOXICILLIN 250 MG
2 CAPSULE ORAL 2 TIMES DAILY PRN
Status: ACTIVE | OUTPATIENT
Start: 2025-07-01

## 2025-07-01 RX ORDER — CEFTRIAXONE 2 G/1
2 INJECTION, POWDER, FOR SOLUTION INTRAMUSCULAR; INTRAVENOUS EVERY 24 HOURS
Status: DISPENSED | OUTPATIENT
Start: 2025-07-01 | End: 2025-07-07

## 2025-07-01 RX ORDER — AZITHROMYCIN 500 MG/5ML
500 INJECTION, POWDER, LYOPHILIZED, FOR SOLUTION INTRAVENOUS EVERY 24 HOURS
Status: COMPLETED | OUTPATIENT
Start: 2025-07-01 | End: 2025-07-01

## 2025-07-01 RX ORDER — HYDROMORPHONE HCL IN WATER/PF 6 MG/30 ML
0.2 PATIENT CONTROLLED ANALGESIA SYRINGE INTRAVENOUS
Status: DISPENSED | OUTPATIENT
Start: 2025-07-01

## 2025-07-01 RX ORDER — OLANZAPINE 5 MG/1
5-10 TABLET, ORALLY DISINTEGRATING ORAL EVERY 6 HOURS PRN
Status: ACTIVE | OUTPATIENT
Start: 2025-07-01

## 2025-07-01 RX ORDER — FLUMAZENIL 0.1 MG/ML
0.2 INJECTION, SOLUTION INTRAVENOUS
Status: ACTIVE | OUTPATIENT
Start: 2025-07-01

## 2025-07-01 RX ORDER — AZITHROMYCIN 500 MG/5ML
500 INJECTION, POWDER, LYOPHILIZED, FOR SOLUTION INTRAVENOUS EVERY 24 HOURS
Status: COMPLETED | OUTPATIENT
Start: 2025-07-02 | End: 2025-07-03

## 2025-07-01 RX ORDER — OXYCODONE HYDROCHLORIDE 5 MG/1
10 TABLET ORAL EVERY 4 HOURS PRN
Refills: 0 | Status: DISPENSED | OUTPATIENT
Start: 2025-07-01

## 2025-07-01 RX ORDER — HALOPERIDOL 5 MG/ML
2.5-5 INJECTION INTRAMUSCULAR EVERY 6 HOURS PRN
Status: ACTIVE | OUTPATIENT
Start: 2025-07-01

## 2025-07-01 RX ORDER — FOLIC ACID 5 MG/ML
1 INJECTION, SOLUTION INTRAMUSCULAR; INTRAVENOUS; SUBCUTANEOUS DAILY
Status: DISPENSED | OUTPATIENT
Start: 2025-07-01

## 2025-07-01 RX ORDER — DIAZEPAM 10 MG/2ML
2.5 INJECTION, SOLUTION INTRAMUSCULAR; INTRAVENOUS ONCE
Status: COMPLETED | OUTPATIENT
Start: 2025-07-01 | End: 2025-07-01

## 2025-07-01 RX ORDER — ENOXAPARIN SODIUM 100 MG/ML
40 INJECTION SUBCUTANEOUS EVERY 24 HOURS
Status: DISCONTINUED | OUTPATIENT
Start: 2025-07-01 | End: 2025-07-03

## 2025-07-01 RX ORDER — HYDRALAZINE HYDROCHLORIDE 10 MG/1
10 TABLET, FILM COATED ORAL EVERY 4 HOURS PRN
Status: ACTIVE | OUTPATIENT
Start: 2025-07-01

## 2025-07-01 RX ORDER — LIDOCAINE 40 MG/G
CREAM TOPICAL
Status: ACTIVE | OUTPATIENT
Start: 2025-07-01

## 2025-07-01 RX ORDER — NALOXONE HYDROCHLORIDE 0.4 MG/ML
0.4 INJECTION, SOLUTION INTRAMUSCULAR; INTRAVENOUS; SUBCUTANEOUS
Status: ACTIVE | OUTPATIENT
Start: 2025-07-01

## 2025-07-01 RX ORDER — AMOXICILLIN 250 MG
2 CAPSULE ORAL 2 TIMES DAILY PRN
Status: DISCONTINUED | OUTPATIENT
Start: 2025-07-01 | End: 2025-07-01

## 2025-07-01 RX ORDER — SERTRALINE HYDROCHLORIDE 20 MG/ML
25 SOLUTION ORAL DAILY
Status: DISCONTINUED | OUTPATIENT
Start: 2025-07-01 | End: 2025-07-01

## 2025-07-01 RX ORDER — HYDROMORPHONE HCL IN WATER/PF 6 MG/30 ML
0.4 PATIENT CONTROLLED ANALGESIA SYRINGE INTRAVENOUS
Status: ACTIVE | OUTPATIENT
Start: 2025-07-01

## 2025-07-01 RX ORDER — AMOXICILLIN 250 MG
1 CAPSULE ORAL 2 TIMES DAILY PRN
Status: ACTIVE | OUTPATIENT
Start: 2025-07-01

## 2025-07-01 RX ORDER — THIAMINE HYDROCHLORIDE 100 MG/ML
100 INJECTION, SOLUTION INTRAMUSCULAR; INTRAVENOUS DAILY
Status: DISPENSED | OUTPATIENT
Start: 2025-07-01 | End: 2025-07-06

## 2025-07-01 RX ADMIN — MAGNESIUM SULFATE HEPTAHYDRATE 4 G: 4 INJECTION, SOLUTION INTRAVENOUS at 10:43

## 2025-07-01 RX ADMIN — FAMOTIDINE 20 MG: 20 TABLET, FILM COATED ORAL at 09:09

## 2025-07-01 RX ADMIN — MULTIVIT AND MINERALS-FERROUS GLUCONATE 9 MG IRON/15 ML ORAL LIQUID 15 ML: at 10:36

## 2025-07-01 RX ADMIN — SODIUM CHLORIDE, SODIUM LACTATE, POTASSIUM CHLORIDE, AND CALCIUM CHLORIDE: .6; .31; .03; .02 INJECTION, SOLUTION INTRAVENOUS at 05:23

## 2025-07-01 RX ADMIN — CEFTRIAXONE 2 G: 2 INJECTION, POWDER, FOR SOLUTION INTRAMUSCULAR; INTRAVENOUS at 02:14

## 2025-07-01 RX ADMIN — SERTRALINE HYDROCHLORIDE 25 MG: 25 TABLET ORAL at 10:32

## 2025-07-01 RX ADMIN — CLONIDINE HYDROCHLORIDE 0.1 MG: 0.1 TABLET ORAL at 02:49

## 2025-07-01 RX ADMIN — DIAZEPAM 5 MG: 10 INJECTION, SOLUTION INTRAMUSCULAR; INTRAVENOUS at 02:12

## 2025-07-01 RX ADMIN — DIAZEPAM 5 MG: 10 INJECTION, SOLUTION INTRAMUSCULAR; INTRAVENOUS at 01:07

## 2025-07-01 RX ADMIN — AZITHROMYCIN MONOHYDRATE 500 MG: 500 INJECTION, POWDER, LYOPHILIZED, FOR SOLUTION INTRAVENOUS at 04:00

## 2025-07-01 RX ADMIN — DIAZEPAM 5 MG: 10 INJECTION, SOLUTION INTRAMUSCULAR; INTRAVENOUS at 05:23

## 2025-07-01 RX ADMIN — OXYCODONE HYDROCHLORIDE 10 MG: 5 TABLET ORAL at 20:39

## 2025-07-01 RX ADMIN — OXYCODONE HYDROCHLORIDE 10 MG: 5 TABLET ORAL at 14:34

## 2025-07-01 RX ADMIN — DIAZEPAM 10 MG: 5 TABLET ORAL at 22:04

## 2025-07-01 RX ADMIN — CLONIDINE HYDROCHLORIDE 0.1 MG: 0.1 TABLET ORAL at 09:09

## 2025-07-01 RX ADMIN — CLONIDINE HYDROCHLORIDE 0.1 MG: 0.1 TABLET ORAL at 16:53

## 2025-07-01 RX ADMIN — FOLIC ACID 1 MG: 5 INJECTION, SOLUTION INTRAMUSCULAR; INTRAVENOUS; SUBCUTANEOUS at 09:08

## 2025-07-01 RX ADMIN — FENTANYL 1 PATCH: 25 PATCH TRANSDERMAL at 10:30

## 2025-07-01 RX ADMIN — IBUPROFEN 400 MG: 100 SUSPENSION ORAL at 22:04

## 2025-07-01 RX ADMIN — CEFTRIAXONE 2 G: 2 INJECTION, POWDER, FOR SOLUTION INTRAMUSCULAR; INTRAVENOUS at 22:05

## 2025-07-01 RX ADMIN — CHLORHEXIDINE GLUCONATE 15 ML: 1.2 RINSE ORAL at 22:05

## 2025-07-01 RX ADMIN — DIAZEPAM 2.5 MG: 10 INJECTION, SOLUTION INTRAMUSCULAR; INTRAVENOUS at 00:28

## 2025-07-01 RX ADMIN — ENOXAPARIN SODIUM 40 MG: 40 INJECTION SUBCUTANEOUS at 09:09

## 2025-07-01 RX ADMIN — FAMOTIDINE 20 MG: 20 TABLET, FILM COATED ORAL at 22:04

## 2025-07-01 RX ADMIN — CHLORHEXIDINE GLUCONATE 15 ML: 1.2 RINSE ORAL at 10:36

## 2025-07-01 RX ADMIN — SODIUM CHLORIDE, SODIUM LACTATE, POTASSIUM CHLORIDE, AND CALCIUM CHLORIDE: .6; .31; .03; .02 INJECTION, SOLUTION INTRAVENOUS at 18:18

## 2025-07-01 RX ADMIN — THIAMINE HYDROCHLORIDE 100 MG: 100 INJECTION, SOLUTION INTRAMUSCULAR; INTRAVENOUS at 09:09

## 2025-07-01 ASSESSMENT — ACTIVITIES OF DAILY LIVING (ADL)
ADLS_ACUITY_SCORE: 57
ADLS_ACUITY_SCORE: 57
ADLS_ACUITY_SCORE: 42
ADLS_ACUITY_SCORE: 57
ADLS_ACUITY_SCORE: 42
ADLS_ACUITY_SCORE: 42
ADLS_ACUITY_SCORE: 57
ADLS_ACUITY_SCORE: 42
ADLS_ACUITY_SCORE: 42
ADLS_ACUITY_SCORE: 57
ADLS_ACUITY_SCORE: 42
ADLS_ACUITY_SCORE: 57
ADLS_ACUITY_SCORE: 57
ADLS_ACUITY_SCORE: 55
ADLS_ACUITY_SCORE: 57
ADLS_ACUITY_SCORE: 45
ADLS_ACUITY_SCORE: 57

## 2025-07-01 ASSESSMENT — LIFESTYLE VARIABLES
HEADACHE, FULLNESS IN HEAD: NOT PRESENT
NAUSEA AND VOMITING: NO NAUSEA AND NO VOMITING
AUDITORY DISTURBANCES: NOT PRESENT
TOTAL SCORE: 7
ANXIETY: NO ANXIETY, AT EASE
TREMOR: SEVERE, EVEN WITH ARMS NOT EXTENDED
PAROXYSMAL SWEATS: NO SWEAT VISIBLE
ORIENTATION AND CLOUDING OF SENSORIUM: ORIENTED AND CAN DO SERIAL ADDITIONS
AGITATION: NORMAL ACTIVITY
VISUAL DISTURBANCES: NOT PRESENT

## 2025-07-01 NOTE — PLAN OF CARE
Alert and orientated. Difficult to understand at times. Elevated blood pressures, other VSS, afebrile. Generalized pain. On 4L NC, acute. SPO2 94-96%. Lung sounds diminished, congested cough. +2 generalized edema. NPO, has g-tube. Scoring 8-12 on CIWA, IV valium given. Assist of two with gait belt and walker. Patient is call light appropriate. Alarms on and audible.       BP (!) 158/87   Pulse 88   Temp 97.8  F (36.6  C) (Tympanic)   Resp 18   Wt 86.2 kg (190 lb)   SpO2 96%   BMI 31.62 kg/m        Melyssa Badillo RN on 7/1/2025 at 6:17 AM

## 2025-07-01 NOTE — PROGRESS NOTES
Attempted to assist pt with his G-tube feeding.   Brought in a 50 Ml Catheter tip syringe.   The tip was too large for the device.  Next attempted a 50 ml Luer-Chano syringe which did not attach to the device.  Primary nurse notified.    Aicha Lewis RN on 7/1/2025 at 12:49 AM

## 2025-07-01 NOTE — ED PROVIDER NOTES
History     Chief Complaint   Patient presents with    Shortness of Breath    Generalized Weakness     HPI  Prasanna Alonso is a 70 year old male who has several chronic issues.  I assumed care at shift change.  Essentially his sister brought him in because she went to pick him up for an MRI that he was scheduled for this morning and he could not walk.  He has been progressively getting worse over at least the last 3 months.  They have had several doctor visits.  Other issues include the fact that he has not been able to do his full G-tube feeds as they are not tolerated.  They tell me he has had imaging including head CT neck CT chest abdomen pelvis CT which were unremarkable.  There is really nothing new today other than he has become so weak he could not get up off the floor and sister was concerned because he lives alone.    Allergies:  No Known Allergies    Problem List:    Patient Active Problem List    Diagnosis Date Noted    Right middle lobe pneumonia 06/30/2025     Priority: Medium    Hypoxemia 06/30/2025     Priority: Medium    Abdominal discomfort 06/30/2025     Priority: Medium    Chronic bilateral low back pain with bilateral sciatica 03/11/2025     Priority: Medium    Chronic pain syndrome 03/11/2025     Priority: Medium    Non morbid obesity due to excess calories 03/11/2025     Priority: Medium    Elevated glucose 03/11/2025     Priority: Medium    Gastrostomy tube dependent (H) 03/11/2025     Priority: Medium    Elevated liver enzymes 03/11/2025     Priority: Medium    History of radiation to head and neck region 03/11/2025     Priority: Medium    Status post chemotherapy 03/11/2025     Priority: Medium    History of radical neck surgery 03/11/2025     Priority: Medium    Chronic, continuous use of opioids 10/24/2022     Priority: Medium    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  Formatting of this note might be different from the original.  Added automatically from request for surgery [id number]  Formatting of this note might be different from the original.  Added automatically from request for surgery [id number]      History of gastrointestinal bleeding 04/13/2021     Priority: Medium    Epidermoid cyst of face 06/09/2020     Priority: Medium    Primary osteoarthritis involving multiple joints 06/09/2020     Priority: Medium    GI bleed 07/04/2019     Priority: Medium    Alcoholic peripheral neuropathy 10/20/2015     Priority: Medium    Tobacco use disorder 09/17/2012     Priority: Medium    Alcohol abuse, daily use 06/13/2011     Priority: Medium     Overview:   chemical dependency          Past Medical History:    Past Medical History:   Diagnosis Date    Allergy     Anxiety     Depressive disorder     Other psychoactive substance dependence, uncomplicated (H)     Substance abuse (H)     Superficial frostbite        Past Surgical History:    Past Surgical History:   Procedure Laterality Date    COLONOSCOPY N/A 7/8/2019    2 tubular adenomas, gastritis and duodenitis. H Pylori was negative.    ESOPHAGOSCOPY, GASTROSCOPY, DUODENOSCOPY (EGD), COMBINED N/A 7/8/2019    Procedure: ESOPHAGOGASTRODUODENOSCOPY, WITH BIOPSY;  Surgeon: Adalberto Cortes MD;  Location: [facility] OR    OTHER SURGICAL HISTORY      2011,600000,OTHER       Family History:    Family History   Problem Relation Age of Onset    No Known Problems Other         No history of GI bleeding    No Known Problems Mother     Unknown/Adopted Father         disappeared when [place] was a kid    No Known Problems Sister     No Known Problems Brother        Social History:  Marital Status:  Single [1]  Social History     Tobacco Use    Smoking status: Former     Current packs/day: 1.00     Average packs/day: 1 pack/day for 50.0 years (50.0 ttl pk-yrs)     Types: Cigarettes     Smokeless tobacco: Never   Vaping Use    Vaping status: Never Used   Substance Use Topics    Alcohol use: Not Currently     Alcohol/week: 42.0 standard drinks of alcohol     Types: 42 Standard drinks or equivalent per week     Comment: Alcoholic Drinks/day: 4-6 oz  daily    Drug use: Never        Medications:    chlorhexidine (PERIDEX) 0.12 % solution  famotidine (PEPCID) 20 MG tablet  fentaNYL (DURAGESIC) 25 mcg/hr 72 hr patch  ibuprofen (ADVIL/MOTRIN) 100 MG/5ML suspension  multivitamin w/minerals (THERA-VIT-M) tablet  oxyCODONE IR (ROXICODONE) 10 MG tablet  sertraline (ZOLOFT) 20 MG/ML (HIGH CONC) solution          Review of Systems    Physical Exam   BP: 123/76  Pulse: 99  Temp: 97.1  F (36.2  C)  Resp: 16  Weight: 83.9 kg (185 lb)  SpO2: 92 %      Physical Exam    ED Course        Procedures              Critical Care time:  none     None         Recent Results (from the past 24 hours)   Extra Tube    Narrative    The following orders were created for panel order Extra Tube.  Procedure                               Abnormality         Status                     ---------                               -----------         ------                     Extra Blue Top Tube[[id number]]                             Final result               Extra Red Top Tube[[id number]]                              Final result                 Please view results for these tests on the individual orders.   Extra Blue Top Tube   Result Value Ref Range    Hold Specimen x    Extra Red Top Tube   Result Value Ref Range    Hold Specimen x    CBC with platelets differential    Narrative    The following orders were created for panel order CBC with platelets differential.  Procedure                               Abnormality         Status                     ---------                               -----------         ------                     CBC with platelets and ...[[id number]]  Abnormal            Final result                 Please view  results for these tests on the individual orders.   Comprehensive metabolic panel   Result Value Ref Range    Sodium 141 135 - 145 mmol/L    Potassium 3.7 3.4 - 5.3 mmol/L    Carbon Dioxide (CO2) 24 22 - 29 mmol/L    Anion Gap 16 (H) 7 - 15 mmol/L    Urea Nitrogen 14.5 8.0 - 23.0 mg/dL    Creatinine 0.89 0.67 - 1.17 mg/dL    GFR Estimate >90 >60 mL/min/1.73m2    Calcium 9.5 8.8 - 10.4 mg/dL    Chloride 101 98 - 107 mmol/L    Glucose 123 (H) 70 - 99 mg/dL    Alkaline Phosphatase 76 40 - 150 U/L    AST 94 (H) 0 - 45 U/L    ALT 40 0 - 70 U/L    Protein Total 6.8 6.4 - 8.3 g/dL    Albumin 3.4 (L) 3.5 - 5.2 g/dL    Bilirubin Total 0.8 <=1.2 mg/dL   Troponin T, High Sensitivity   Result Value Ref Range    Troponin T, High Sensitivity 16 <=22 ng/L   NT-proBNP   Result Value Ref Range    NT-proBNP 38 0 - 229 pg/mL   Ammonia   Result Value Ref Range    Ammonia 21 16 - 60 umol/L   Blood gas venous   Result Value Ref Range    pH Venous 7.39 7.32 - 7.43    pCO2 Venous 45 40 - 50 mm Hg    pO2 Venous 79 (H) 25 - 47 mm Hg    Bicarbonate Venous 27 21 - 28 mmol/L    Base Excess/Deficit Venous 1.3 -3.0 - 3.0 mmol/L    FIO2 37     Oxyhemoglobin Venous 93 (H) 70 - 75 %    O2 Sat, Venous 94.7 (H) 70.0 - 75.0 %    Narrative    In healthy individuals, oxyhemoglobin (O2Hb) and oxygen saturation (SO2) are approximately equal. In the presence of dyshemoglobins, oxyhemoglobin can be considerably lower than oxygen saturation.   CBC with platelets and differential   Result Value Ref Range    WBC Count 4.9 4.0 - 11.0 10e3/uL    RBC Count 3.81 (L) 4.40 - 5.90 10e6/uL    Hemoglobin 14.1 13.3 - 17.7 g/dL    Hematocrit 41.4 40.0 - 53.0 %     (H) 78 - 100 fL    MCH 37.0 (H) 26.5 - 33.0 pg    MCHC 34.1 31.5 - 36.5 g/dL    RDW 14.2 10.0 - 15.0 %    Platelet Count 139 (L) 150 - 450 10e3/uL    % Neutrophils 74 %    % Lymphocytes 10 %    % Monocytes 15 %    % Eosinophils 1 %    % Basophils 1 %    % Immature Granulocytes 0 %    NRBCs per 100 WBC 0  <1 /100    Absolute Neutrophils 3.6 1.6 - 8.3 10e3/uL    Absolute Lymphocytes 0.5 (L) 0.8 - 5.3 10e3/uL    Absolute Monocytes 0.7 0.0 - 1.3 10e3/uL    Absolute Eosinophils 0.0 0.0 - 0.7 10e3/uL    Absolute Basophils 0.1 0.0 - 0.2 10e3/uL    Absolute Immature Granulocytes 0.0 <=0.4 10e3/uL    Absolute NRBCs 0.0 10e3/uL   TSH Reflex [facility]   Result Value Ref Range    TSH 9.70 (H) 0.30 - 4.20 uIU/mL   T4 free   Result Value Ref Range    Free T4 0.99 0.90 - 1.70 ng/dL   XR Chest Port 1 View    Narrative    EXAM: XR CHEST PORT 1 VIEW  LOCATION: [facility]  DATE: 6/30/2025    INDICATION: Shortness of breath  COMPARISON: CT chest 06/23/2020.      Impression    IMPRESSION: Cardiac silhouette is within normal limits. Aortic arch calcification. Linear/patchy opacities of the medial right lung base may represent atelectasis or developing infection depending on the clinical context. Asymmetric elevation the right   hemidiaphragm. No pleural effusion or discernible pneumothorax.       Medications   ipratropium - albuterol 0.5 mg/2.5 mg (3mg)/3 mL (DUONEB) neb solution 3 mL ( Nebulization Canceled Entry 6/30/25 2054)   sodium chloride 0.9% BOLUS 1,000 mL (1,000 mLs Intravenous $New Bag 6/30/25 2059)   dextrose 10% infusion (has no administration in time range)   multivitamins w/minerals liquid 15 mL (has no administration in time range)   iopamidol (ISOVUE-370) solution 107 mL (107 mLs Intravenous $Given 6/30/25 2041)   sodium chloride 0.9 % bag for CT scan flush (60 mLs Intravenous $Given 6/30/25 2041)       Assessments & Plan (with Medical Decision Making)     I have reviewed the nursing notes.    I have reviewed the findings, diagnosis, plan and need for follow up with the patient.           Medical Decision Making  The patient's presentation was of high complexity (a chronic illness severe exacerbation, progression, or side effect of treatment).    The patient's evaluation involved:  ordering and/or review  of 3+ test(s) in this encounter (see separate area of note for details)    The patient's management necessitated high risk (a decision regarding hospitalization).        New Prescriptions    No medications on file       Final diagnoses:   Hypoxemia   Abdominal discomfort   Aspiration pneumonitis (H)   Patient with several chronic comorbidities, worsening weakness.  However, today has a new oxygen requirement.  Given his G-tube feeds suspected underlying aspiration pneumonitis versus pneumonia.  He was also due to have an MRI today which has not yet been done as he came straight to the ER with his sister.  At this time he will be admitted for further workup.  See Dr. Bass's note for further details as I assumed care at shift change.  CT final read pending at this time.    6/30/2025   [facility]       Rabia Bolañso DO  06/30/25 4178

## 2025-07-01 NOTE — H&P
[facility]    History and Physical - Hospitalist Service       Date of Admission:  6/30/2025    Assessment & Plan      Prasanna Alonso is a 70 year old male admitted on 6/30/2025. He presented with a right sided pneumonia.     Right sided aspiration pneumonia  He presented after a fall with weakness but he actually denies any respiratory symptoms. Imaging, however, shows right sided infiltrates that could be pneumonia or pneumonitis. Given that he has a G-gube, the possibility of aspiration is raised.   - admit to hospitalist service-  - c/w ceftriaxone and azithromycin  - follow-up blood and sputum cultures  - c/w supplemental oxygen as needed  - hold tube feeds for now    G-tube dependent  History of head and neck cancer s/p radical surgery, chemotherapy and radiation  - hold feedings via his G-tube.     EtOH abuse  He admits that he uses his J-tube to ingest alcohol every day. He ingests about 3-4 daily. His intake is probably related to his long-standing depression.   - CIWA protocol initiated with IV diazepam  - c/w IV folic acid and thiamine    Depression  His PCP is concerned about his depression which is likely caused mostly by his medical conditions. He was recently started on sertraline.   - c/w sertraline once confirmed    LE Edema  He has 2-3+ swelling in his lower extremities. His recent echocardiogram shows no evidence of heart failure and his renal function seems to be stable. His albumin is also only modestly decreased. It is not clear why he is so overloaded, but he might have lymphedema. I suspect that the swelling is the reason he has gained weights despite only using half the amount of tube feeds he is supposed to use. He may require some furosemide which could make his ambulation easier.      Diet: Adult Formula Bolus Feeding: Jevity 1.5; Route: Gastrostomy; 5 times daily; Volume per Bolus: 1; Can(s)/ Carton(s)  NPO for Medical/Clinical Reasons Except for: No Exceptions     DVT Prophylaxis: Pneumatic Compression Devices  [place] Catheter: Not present  Lines: None     Code Status: Full Code      Clinically Significant Risk Factors Present on Admission             # Hypomagnesemia: Lowest Mg = 1.4 mg/dL in last 2 days, will replace as needed   # Hypoalbuminemia: Lowest albumin = 3.4 g/dL at 6/30/2025  5:17 PM, will monitor as appropriate                          Disposition Plan      Expected Discharge Date: 07/05/2025                The patient's care was discussed with the Bedside Nurse and Patient.        Edson Cox MD  [facility]  Securely message with the Vocera Web Console (learn more here)  Text page via [facility] Paging/Directory      Visit/Communication Style   Virtual (Video) communication was used to evaluate Prasanna.  Prasanna consented to the use of video communication: yes  Video START time: 0400, 7/1/2025  Video STOP time: 0420, 7/1/2025   Patient's location: [facility]   Provider's location during the visit: [facility] Tele-medicine site        ______________________________________________________________________    Chief Complaint   Shortness of breath  Weakness    History is obtained from the patient    History of Present Illness   Prasanna Alonso is a 70 year old male who presented to the ED with generalized weakness and shortness of breath. He states that he felt weak and fell down. He walks unassisted normally. On the ground, he was unable to stand up again but crawled over to his couch and was able to pull himself onto it before calling for help. He did not have any dizziness or chest pain. He has become progressively weak over the last 3 months and he notes that he is only using half the amount of tube feeds he is supposed to use (three, instead of 6 cans) because he cannot seem to tolerate them. Despite this, he has put on a lot of weight. He was going to have an MRI brain to make sure that he did not have a stroke  that would explain his weakness but he was unable to walk when his sister came to pick him up.     Review of Systems    General: negative for fever, chills, sweats  Eyes: negative for blurred vision, loss of vision  Ear Nose and Throat: negative for pharyngitis, speech or swallowing difficulties  Respiratory:  negative for sputum production, wheezing, [place], pleuritic pain, or cough  Cardiology:  negative for chest pain, palpitations, orthopnea, PND, edema, syncope   Gastrointestinal: negative for abdominal pain, nausea, vomiting, diarrhea, constipation, hematemesis, melena or hematochezia  Genitourinary: negative for frequency, urgency, dysuria, hematuria   Neurological: negative for focal weakness, paresthesia    Past Medical History    I have reviewed this patient's medical history and updated it with pertinent information if needed.   Past Medical History:   Diagnosis Date    Allergy     Anxiety     Depressive disorder     Other psychoactive substance dependence, uncomplicated (H)     etoh, tob    Substance abuse (H)     Superficial frostbite     30 yr ago       Past Surgical History   I have reviewed this patient's surgical history and updated it with pertinent information if needed.  Past Surgical History:   Procedure Laterality Date    COLONOSCOPY N/A 7/8/2019    2 tubular adenomas, gastritis and duodenitis. H Pylori was negative.    ESOPHAGOSCOPY, GASTROSCOPY, DUODENOSCOPY (EGD), COMBINED N/A 7/8/2019    Procedure: ESOPHAGOGASTRODUODENOSCOPY, WITH BIOPSY;  Surgeon: Adalberto Cortes MD;  Location:  OR    OTHER SURGICAL HISTORY      2011,600000,OTHER       Social History   I have reviewed this patient's social history and updated it with pertinent information if needed.  Social History     Tobacco Use    Smoking status: Former     Current packs/day: 1.00     Average packs/day: 1 pack/day for 50.0 years (50.0 ttl pk-yrs)     Types: Cigarettes    Smokeless tobacco: Never   Vaping Use    Vaping status: Never Used    Substance Use Topics    Alcohol use: Not Currently     Alcohol/week: 42.0 standard drinks of alcohol     Types: 42 Standard drinks or equivalent per week     Comment: Alcoholic Drinks/day: 4-6 oz  daily    Drug use: Never       Family History   I have reviewed this patient's family history and updated it with pertinent information if needed.  Family History   Problem Relation Age of Onset    No Known Problems Other         No history of GI bleeding    No Known Problems Mother     Unknown/Adopted Father         disappeared when [place] was a kid    No Known Problems Sister     No Known Problems Brother        Prior to Admission Medications   Prior to Admission Medications   Prescriptions Last Dose Informant Patient Reported? Taking?   chlorhexidine (PERIDEX) 0.12 % solution   Yes No   Sig: Take 15 mLs by mouth 2 times daily. Swish and spit   famotidine (PEPCID) 20 MG tablet   Yes No   Sig: Take 20 mg by mouth 2 times daily.   fentaNYL (DURAGESIC) 25 mcg/hr 72 hr patch   Yes No   Sig: Place 1 patch onto the skin every 72 hours.   ibuprofen (ADVIL/MOTRIN) 100 MG/5ML suspension   Yes No   Sig: Take 20 mLs by mouth every 6 hours as needed.   multivitamin w/minerals (THERA-VIT-M) tablet   No No   Sig: Take 1 tablet by mouth daily   oxyCODONE IR (ROXICODONE) 10 MG tablet   Yes No   Sig: Take 10 mg by mouth every 4 hours as needed for pain.   sertraline (ZOLOFT) 20 MG/ML (HIGH CONC) solution   No No   Sig: Place 1.25 mLs (25 mg) into G tube daily.      Facility-Administered Medications: None     Allergies   No Known Allergies    Physical Exam   Vital Signs: Temp: 97.8  F (36.6  C) Temp src: Tympanic BP: (!) 145/79 Pulse: 88   Resp: 18 SpO2: 96 % O2 Device: Nasal cannula Oxygen Delivery: 4 LPM  Weight: 185 lbs 0 oz    Gen:  Well-developed, well-nourished, in no acute distress, lying semi-supine in hospital stretcher  HEENT:  Anicteric sclera, PER, hearing intact to voice  Resp:  No accessory muscle use, breath sounds clear; no  wheezes no rales no rhonchi  Card:  Regular rhythm and rate, no murmur, normal S1, S2, no JVD, no LE edema  Abd:  Soft per RN exam, no TTP, non-distended, normoactive bowel sounds are present  Musc:  Normal strength and movement of the major muscle groups without obvious deformity  Skin: Intact, no rashes noted  Psych:  Good insight, oriented to person, place and time, not anxious, not agitated  Neuro: CN 2-12 intact, no focal deficits or sensory deficits  Data     Recent Labs   Lab 06/30/25  1717   WBC 4.9   HGB 14.1   *   *      POTASSIUM 3.7   CHLORIDE 101   CO2 24   BUN 14.5   CR 0.89   ANIONGAP 16*   LANI 9.5   *   ALBUMIN 3.4*   PROTTOTAL 6.8   BILITOTAL 0.8   ALKPHOS 76   ALT 40   AST 94*         Recent Results (from the past 24 hours)   XR Chest Port 1 View    Narrative    EXAM: XR CHEST PORT 1 VIEW  LOCATION: [facility]  DATE: 6/30/2025    INDICATION: Shortness of breath  COMPARISON: CT chest 06/23/2020.      Impression    IMPRESSION: Cardiac silhouette is within normal limits. Aortic arch calcification. Linear/patchy opacities of the medial right lung base may represent atelectasis or developing infection depending on the clinical context. Asymmetric elevation the right   hemidiaphragm. No pleural effusion or discernible pneumothorax.   CT Chest/Abdomen/Pelvis w Contrast    Narrative    EXAM: CT CHEST/ABDOMEN/PELVIS W CONTRAST  LOCATION: [facility]  DATE: 6/30/2025    INDICATION: Cough, congestion, abnormal chest x ray, hypoxemia, please rule out occult pneumonia.  Also patient with worsening abdominal distention and tenderness, patient has indwelling PEG tube, rule out obstruction please  COMPARISON: Same date portable chest radiograph, CT abdomen/pelvis with contrast 07/20/2020, CT chest without contrast 06/23/2020  TECHNIQUE: CT scan of the chest, abdomen, and pelvis was performed following injection of IV contrast. Multiplanar  reformats were obtained. Dose reduction techniques were used.   CONTRAST: 107mL Isovue 370    FINDINGS:     LUNGS AND PLEURA: Corresponding with the findings on same day chest radiograph is linear subsegmental atelectasis or scarring of the right lower lobe. There is also mild subsegmental atelectasis or scarring of the left lower lobe. No acute focal   pulmonary consolidation otherwise, or pleural effusion. Scattered small solid pulmonary nodules are essentially unchanged and favored to represent benign findings, as example a 5 mm peripheral nodule of the right lower lobe (series 4 image 182).    MEDIASTINUM/AXILLAE: No lymphadenopathy or pericardial effusion. Atherosclerotic calcifications of the aortic arch and descending thoracic aorta.    CORONARY ARTERY CALCIFICATION: None.    HEPATOBILIARY: Diffuse hepatic steatosis. Benign hemangioma of the right hepatic lobe. Unremarkable gallbladder.    PANCREAS: Normal.    SPLEEN: Normal.    ADRENAL GLANDS: Normal.    KIDNEYS/BLADDER: Small left renal cysts which require no dedicated follow-up. No hydronephrosis. Unremarkable urinary bladder.    BOWEL: Colonic diverticulosis. Normal appendix. Percutaneous gastrostomy tube. No bowel obstruction.    LYMPH NODES: Shotty retroperitoneal lymph nodes, nonspecific but may be reactive.    VASCULATURE: Atherosclerotic calcifications of the aortoiliac vessels without evidence of aneurysmal dilatation.    PELVIC ORGANS: Prominent prostate.    MUSCULOSKELETAL: Small to moderate sized umbilical hernia containing fat and a knuckle of small bowel. Right hip arthroplasty. Left femur intramedullary abdiel and screw. Degenerative changes of the left hip. Multilevel degenerative changes of the   cervicothoracic and lumbosacral spine. Dense bones overall, which is nonspecific but can be seen with renal osteodystrophy. No acute osseous abnormality.      Impression    IMPRESSION:    1.  Corresponding with the findings on same day chest radiograph  is linear subsegmental atelectasis or scarring of the right lower lobe. There is also mild linear subsegmental atelectasis or scarring of the left lower lobe. No acute focal pulmonary   consolidation otherwise, or pleural effusion.  2.  Diffuse hepatic steatosis, which may be a source of abdominal pain.  3.  Percutaneous gastrostomy tube.  4.  No bowel obstruction.  5.  Colonic diverticulosis, without diverticulitis.

## 2025-07-01 NOTE — PROGRESS NOTES
Preventing Falls in the Hospital (02:42)  Your health professional recommends that you watch this short online health video.  Find out why you're at risk for falling in the hospital and how to prevent falls.   Purpose: Understand what increases a person's risk of falling in the hospital and how to prevent falls.  Goal: Understand what increases a person's risk of falling in the hospital and how to prevent falls.    Watch: Scan the QR code or visit the link to view video       https://hwi.se/r/[email or web address]  Current as of: July 17, 2023  Content Version: 14.2 2024 [facility].   Care instructions adapted under license by your healthcare professional. If you have questions about a medical condition or this instruction, always ask your healthcare professional. [employer] disclaims any warranty or liability for your use of this information.  Preventing Falls in the Hospital

## 2025-07-01 NOTE — PROGRESS NOTES
:     Met with patient in room to discuss discharge planning needs. Patient stated that he is agreeable to short term rehab. Referrals have been sent to [facility] and [facility].     Pt/family was given the Medicare Compare list for SNF, with associated star ratings to assist with choice for referrals/discharge planning Yes    Education was given to pt/family that star ratings are updated/maintained by Medicare and can be reviewed by visiting www.medicare.gov Yes    PATRICK Ma on 7/1/2025 at 12:34 PM

## 2025-07-01 NOTE — PROGRESS NOTES
07/01/25 1325   Appointment Info   Signing Clinician's Name / Credentials (OT) Gerri Huynh, OTR/L   Living Environment   People in Home alone   Current Living Arrangements apartment   Home Accessibility no concerns   Transportation Anticipated family or friend will provide   Self-Care   Usual Activity Tolerance moderate   Current Activity Tolerance fair   Equipment Currently Used at Home shower chair;grab bar, tub/shower   Cognitive Status Examination   Orientation Status place;person   Follows Commands WFL   Cognitive Status Comments Pt appears near baseline functioning but will continue to assess   Visual Perception   Visual Impairment/Limitations WFL   Sensory   Sensory Quick Adds   (pt reports neuropathy in bilateral feet now and at baseline)   Pain Assessment   Patient Currently in Pain Yes, see Vital Sign flowsheet  (reports arthritis pain and low back pain, provided ice pack to low back)   Range of Motion Comprehensive   General Range of Motion bilateral upper extremity ROM WFL   Coordination   Upper Extremity Coordination   (pt has bilateral tremors in U/E's)   Transfers   Transfers sit-stand transfer   Sit-Stand Transfer   Sit-Stand [place] (Transfers) contact guard;1 person to manage equipment   Assistive Device (Sit-Stand Transfers) walker, front-wheeled   Clinical Impression   Criteria for Skilled Therapeutic Interventions Met (OT) Yes, treatment indicated   OT Diagnosis right middle lobe pneumonia   Influenced by the following impairments weakness, decreased activity tolerance, new O2 needs and tremors   OT Problem List-Impairments impacting ADL activity tolerance impaired;strength   Assessment of Occupational Performance 1-3 Performance Deficits   Identified Performance Deficits mobility and self care   Planned Therapy Interventions (OT) ADL retraining;progressive activity/exercise   Clinical Decision Making Complexity (OT) problem focused assessment/low complexity   Risk & Benefits of  therapy have been explained risks/benefits reviewed   OT Total Evaluation Time   OT Eval, Low Complexity Minutes (90830) 15   OT Goals   Therapy Frequency (OT) Daily   OT Predicted Duration/Target Date for Goal Attainment 07/03/25   OT Goals Hygiene/Grooming;Upper Body Dressing;Transfers;Toilet Transfer/Toileting   OT: Hygiene/Grooming supervision/stand-by assist   OT: Upper Body Dressing Supervision/stand-by assist   OT: Transfer Minimal assist   OT: Toilet Transfer/Toileting Minimal assist   Interventions   Interventions Quick Adds Therapeutic Activity   Therapeutic Activities   Therapeutic Activity Minutes (25647) 15   Symptoms noted during/after treatment fatigue   Treatment Detail/Skilled Intervention Pt moved sit to stand with FWW and CGA and ambualted short distance in room with FWW and CGA.   OT Discharge Planning   OT Plan progress ADL's and activity tolerance   OT Discharge Recommendation (DC Rec) Transitional Care Facility   OT Rationale for DC Rec Pt would greatly benefit from STR to address safety and independence needed to return to apt as previous   OT Brief overview of current status see above for treatment details   Total Session Time   Timed Code Treatment Minutes 15   Total Session Time (sum of timed and untimed services) 30

## 2025-07-01 NOTE — PLAN OF CARE
Problem: Adult Inpatient Plan of Care  Goal: Plan of Care Review  Description: The Plan of Care Review/Shift note should be completed every shift.  The Outcome Evaluation is a brief statement about your assessment that the patient is improving, declining, or no change.  This information will be displayed automatically on your shift  note.  Outcome: Progressing  Flowsheets (Taken 7/1/2025 1412)  Outcome Evaluation: Vital signs stable, CIWA scores below 8, no medication given at this time and reevaluated as needed and scheduled. Patient up in chair for parts of the day, family present in afternoon, and patient tolerating tube feeding via gtube.  Plan of Care Reviewed With: patient  Overall Patient Progress: no change  Goal: Absence of Hospital-Acquired Illness or Injury  Intervention: Identify and Manage Fall Risk  Recent Flowsheet Documentation  Taken 7/1/2025 0900 by Maria Del Carmen Urbano RN  Safety Promotion/Fall Prevention: safety round/check completed  Intervention: Prevent Skin Injury  Recent Flowsheet Documentation  Taken 7/1/2025 0900 by Maria Del Carmen Urbano RN  Body Position: (sitting up in chair) weight shifting  Taken 7/1/2025 0855 by Maria Del Carmen Urbano RN  Body Position: (sitting up in chair) legs elevated  Intervention: Prevent and Manage VTE (Venous Thromboembolism) Risk  Recent Flowsheet Documentation  Taken 7/1/2025 0900 by Maria Del Carmen Urbano RN  VTE Prevention/Management: SCDs off (sequential compression devices)  Intervention: Prevent Infection  Recent Flowsheet Documentation  Taken 7/1/2025 0900 by Maria Del Carmen Urbano RN  Infection Prevention:   single patient room provided   rest/sleep promoted  Goal: Optimal Comfort and Wellbeing  Intervention: Monitor Pain and Promote Comfort  Recent Flowsheet Documentation  Taken 7/1/2025 0855 by Maria Del Carmen Urbano RN  Pain Management Interventions: declines  Intervention: Provide Person-Centered Care  Recent Flowsheet Documentation  Taken 7/1/2025 0900  by [place], Maria Del Carmen L., RN  Trust Relationship/Rapport:   care explained   choices provided   empathic listening provided   emotional support provided     Problem: Delirium  Goal: Optimal Coping  Intervention: Optimize Psychosocial Adjustment to Delirium  Recent Flowsheet Documentation  Taken 7/1/2025 0900 by Maria Del Carmen Urbano RN  Supportive Measures: active listening utilized  Goal: Improved Behavioral Control  Intervention: Minimize Safety Risk  Recent Flowsheet Documentation  Taken 7/1/2025 0900 by Maria Del Carmen Urbano RN  Enhanced Safety Measures: assistive devices when indicated  Trust Relationship/Rapport:   care explained   choices provided   empathic listening provided   emotional support provided  Goal: Improved Attention and Thought Clarity  Intervention: Maximize Cognitive Function  Recent Flowsheet Documentation  Taken 7/1/2025 0900 by Maria Del Carmen Urbano RN  Reorientation Measures:   calendar in view   clock in view     Problem: Alcohol Withdrawal  Goal: Readiness for Change Identified  Intervention: Partner to Facilitate Behavior Change  Recent Flowsheet Documentation  Taken 7/1/2025 0900 by Maria Del Carmen Urbano RN  Supportive Measures: active listening utilized     Problem: Fall Injury Risk  Goal: Absence of Fall and Fall-Related Injury  Intervention: Identify and Manage Contributors  Recent Flowsheet Documentation  Taken 7/1/2025 0900 by Maria Del Carmen Urbano RN  Medication Review/Management: medications reviewed  Intervention: Promote Injury-Free Environment  Recent Flowsheet Documentation  Taken 7/1/2025 0900 by Maria Del Carmen Urbano RN  Safety Promotion/Fall Prevention: safety round/check completed     Problem: Skin Injury Risk Increased  Goal: Skin Health and Integrity  Intervention: Plan: Nurse Driven Intervention: Moisture Management  Recent Flowsheet Documentation  Taken 7/1/2025 0900 by Maria Del Carmen Urbano RN  Moisture Interventions:   Encourage regular toileting   Incontinence  pad  Intervention: Plan: Nurse Driven Intervention: Friction and Shear  Recent Flowsheet Documentation  Taken 7/1/2025 0900 by Maria Del Carmen Urbano RN  Friction/Shear Interventions: HOB 30 degrees or less  Intervention: Optimize Skin Protection  Recent Flowsheet Documentation  Taken 7/1/2025 0855 by Maria Del Carmen Urbano, RN  Activity Management: ambulated to bathroom

## 2025-07-01 NOTE — ED NOTES
Patient is very tremulous. Patient was asked if he drinks alcohol. Patient stated that he does every night. When asked how much patient states that he uses two 60cc syringes of vodka each night.  Last night was when he last used any alcohol. Patient states he has had a seizure in the past, about 15 years ago. MD notified.

## 2025-07-01 NOTE — PROGRESS NOTES
[facility] ADMISSION NOTE    Patient admitted to room 355 at approximately 0040 via bed from emergency room. Patient was accompanied by transport tech.     Verbal SBAR report received from Rodney prior to patient arrival.     Patient trasferred to bed via air jorge alberto. Patient alert and oriented X 3. Admission vital signs: Blood pressure (!) 153/98, pulse 100, temperature 97.8  F (36.6  C), temperature source Tympanic, resp. rate 18, weight 83.9 kg (185 lb), SpO2 92%. Patient was oriented to plan of care, call light, bed controls, tv, telephone, bathroom, and visiting hours.     Risk Assessment    The following safety risks were identified during admission: fall. Yellow risk band applied: YES.     Skin Initial Assessment    This writer admitted this patient and completed a full skin assessment and Thierno score in the Adult PCS flowsheet.    Appropriate interventions initiated as needed.            Education    Patient has a [place] to Observation order: No  Observation education completed and documented: N/A      Melyssa Badillo RN

## 2025-07-01 NOTE — PHARMACY-ADMISSION MEDICATION HISTORY
Pharmacist Admission Medication History    Admission medication history is complete. The information provided in this note is only as accurate as the sources available at the time of the update.    Information Source(s): Patient via in-person interview  -Surescripts    Pertinent Information:   -Fentanyl patch: pt reports last administration earlier this week- took off patch prior to shower yesterday morning and did not put back on.    -Sertraline liquid: prescribed by PCP at  earlier this month; patient reports he has not yet started taking.    Changes made to PTA medication list:  Added: None  Deleted: None  Changed: None    Allergies reviewed with patient and updates made in EHR: yes    Medication History Completed By: Thien Singh [facility] 7/1/2025 10:13 AM    PTA Med List   Medication Sig Last Dose/Taking    chlorhexidine (PERIDEX) 0.12 % solution Take 15 mLs by mouth 2 times daily. Swish and spit Taking    famotidine (PEPCID) 20 MG tablet Take 20 mg by mouth 2 times daily. Taking    fentaNYL (DURAGESIC) 25 mcg/hr 72 hr patch Place 1 patch onto the skin every 72 hours. Taking    ibuprofen (ADVIL/MOTRIN) 100 MG/5ML suspension Take 20 mLs by mouth every 6 hours as needed. Taking As Needed    multivitamin w/minerals (THERA-VIT-M) tablet Take 1 tablet by mouth daily 6/29/2025 Evening    oxyCODONE IR (ROXICODONE) 10 MG tablet Take 10 mg by mouth every 4 hours as needed for pain. 6/30/2025 Morning    sertraline (ZOLOFT) 20 MG/ML (HIGH CONC) solution Place 1.25 mLs (25 mg) into G tube daily. Taking

## 2025-07-01 NOTE — PROGRESS NOTES
Interdisciplinary Discharge Planning Note    Anticipated Discharge Date:2-3 days    Anticipated Discharge Location: [facility]    Clinical Needs Before Discharge:  Restart G-tube feedings, abx, follow up blood cultures, PT/OT    Treatment Needs After Discharge:  rehab (PT, OT, ST)    Potential Barriers to Discharge: None identified at this time    PATRICK Ma  7/1/2025,  12:05 PM

## 2025-07-01 NOTE — PROGRESS NOTES
[facility]    Medicine Progress Note - Hospitalist Service    Date of Admission:  6/30/2025    Assessment & Plan      Prasanna Alonso is a 70 year old man with a past medical history of neck cancer status post resection and G-tube placement, alcohol use disorder and chronic pain who was admitted on 6/30/2025 due to acute hypoxic respiratory failure from a right sided presumed aspiration pneumonia.    Patient presented emergency department the day of admission due to shortness of breath and abdominal distention and overall generalized weakness.  H he reports having fallen at home and was unable to get up.  Workup in the emergency department was remarkable for sats to 82% on room air and imaging remarkable for scarring/atelectasis of the left and right lower lobes and hepatic steatosis.  He was subsequently admitted to the hospital for presumed aspiration pneumonia and hypoxic respiratory failure    Right sided aspiration pneumonia  He presented after a fall with weakness but he actually denies any respiratory symptoms. Imaging, however, shows right sided infiltrates that could be pneumonia or pneumonitis. Given that he has a G-gube, the possibility of aspiration is raised though could be chronic.  Will restart tube feeds and reassess his breathing.  Concerned about his living situation at home.  - c/w ceftriaxone and azithromycin  - follow-up blood and sputum cultures  - c/w supplemental oxygen as needed  - restart tube feeds  - Low threshold for CT PE study    G-tube dependent  History of head and neck cancer s/p radical surgery, chemotherapy and radiation   Hypomagnesemia  - Restart tube feeds 4 times daily  -magnesium replacement protocol      EtOH abuse  He admits that he uses his J-tube to ingest alcohol every day. He ingests about 3-4 daily. His intake is probably related to his long-standing depression.   - CIWA protocol initiated with IV diazepam  - c/w IV folic acid and thiamine  -  Restart sertraline    Falls, Generalized weakness  Assessment: Mechanical fall, reports no injuries, but cannot get up at home.  Will add on a CK to ensure he does not develop rhabdomyolysis  Plan:   -add on CK      Macrocytic anemia  Thrombocytopenia  Assessment: Previous B12 and folate levels were normal though sometime ago.  Suspect related to his alcoholism.  WBC normal this hospitalization.  Likely due to alcohol use  Plan:   - Vitamin B12, folate levels in a.m.    Depression  His PCP is concerned about his depression which is likely caused mostly by his medical conditions. He was recently started on sertraline.   - c/w sertraline once confirmed    LE Edema  Significant edema on admission but on my evaluation after his legs have been elevated there is only slight edema.  Echocardiogram normal in March 2025.  UA on admission without significant proteinuria.  Plan:  -Hold on diuretics for now, elevate legs as able            Diet: NPO for Medical/Clinical Reasons Except for: No Exceptions  Adult Formula Bolus Feeding: Other; Osmolite 1.2; Route: Gastrostomy; 4 times daily; Volume per Bolus: 1; Can(s)/ Carton(s)    DVT Prophylaxis: Enoxaparin (Lovenox) SQ  [place] Catheter: Not present  Lines: None     Cardiac Monitoring: None  Code Status: Full Code      Clinically Significant Risk Factors Present on Admission             # Hypomagnesemia: Lowest Mg = 1.2 mg/dL in last 2 days, will replace as needed   # Hypoalbuminemia: Lowest albumin = 3.1 g/dL at 7/1/2025  3:23 AM, will monitor as appropriate   # Thrombocytopenia: Lowest platelets = 118 in last 2 days, will monitor for bleeding                        Social Drivers of Health    Depression: At risk (6/12/2025)    PHQ-2     PHQ-2 Score: 6   Tobacco Use: Medium Risk (6/30/2025)    Patient History     Smoking Tobacco Use: Former     Smokeless Tobacco Use: Never   Alcohol Use: Alcohol Misuse (3/11/2025)    AUDIT-C     Frequency of Alcohol Consumption: 4 or more times  a week     Average Number of Drinks: 5 or 6     Frequency of Binge Drinking: Never          Disposition Plan     Medically Ready for Discharge: Anticipated in 2-4 Days             Ruben English MD  Hospitalist Service  [facility]  Securely message with [employer] (more info)  Text page via [facility] Paging/Directory   ______________________________________________________________________    Interval History   Would like to start up his tube feeds.  States he has not eaten about 24 hours.  He endorses drinking alcohol up to 4 times per day.  No chest pain no shortness of breath.  He does not have any fevers or chills.  Still requiring supplemental oxygen and not on oxygen prior to admission.    Physical Exam   Vital Signs: Temp: 97.8  F (36.6  C) Temp src: Tympanic BP: (!) 158/87 Pulse: 88   Resp: 18 SpO2: 96 % O2 Device: Nasal cannula Oxygen Delivery: 4 LPM  Weight: 190 lbs 0 oz  General: Pleasant 70-year-old man lying in bed no acute distress  HEENT: Chronic scarring from radiation and surgery  CV: Regular rate and rhythm, mild pitting edema to just above the ankle bilaterally  Pulmonary: Clear to auscultation, diminished bilateral bases  GI: Soft    Medical Decision Making             Data     I have personally reviewed the following data over the past 24 hrs:    5.3  \   13.1 (L)   / 118 (L)     143 103 12.7 /  150 (H)   3.7 23 0.71 \     ALT: 37 AST: 83 (H) AP: 72 TBILI: 0.7   ALB: 3.1 (L) TOT PROTEIN: 6.6 LIPASE: N/A     Trop: 16 BNP: 38     TSH: 9.70 (H) T4: 0.99 A1C: N/A       Imaging results reviewed over the past 24 hrs:   Recent Results (from the past 24 hours)   XR Chest Port 1 View    Narrative    EXAM: XR CHEST PORT 1 VIEW  LOCATION: [facility] [place] [facility]""  DATE: 6/30/2025    INDICATION: Shortness of breath  COMPARISON: CT chest 06/23/2020.      Impression    IMPRESSION: Cardiac silhouette is within normal limits. Aortic arch calcification. Linear/patchy opacities of the medial  right lung base may represent atelectasis or developing infection depending on the clinical context. Asymmetric elevation the right   hemidiaphragm. No pleural effusion or discernible pneumothorax.   CT Chest/Abdomen/Pelvis w Contrast    Narrative    EXAM: CT CHEST/ABDOMEN/PELVIS W CONTRAST  LOCATION: [facility]  DATE: 6/30/2025    INDICATION: Cough, congestion, abnormal chest x ray, hypoxemia, please rule out occult pneumonia.  Also patient with worsening abdominal distention and tenderness, patient has indwelling PEG tube, rule out obstruction please  COMPARISON: Same date portable chest radiograph, CT abdomen/pelvis with contrast 07/20/2020, CT chest without contrast 06/23/2020  TECHNIQUE: CT scan of the chest, abdomen, and pelvis was performed following injection of IV contrast. Multiplanar reformats were obtained. Dose reduction techniques were used.   CONTRAST: 107mL Isovue 370    FINDINGS:     LUNGS AND PLEURA: Corresponding with the findings on same day chest radiograph is linear subsegmental atelectasis or scarring of the right lower lobe. There is also mild subsegmental atelectasis or scarring of the left lower lobe. No acute focal   pulmonary consolidation otherwise, or pleural effusion. Scattered small solid pulmonary nodules are essentially unchanged and favored to represent benign findings, as example a 5 mm peripheral nodule of the right lower lobe (series 4 image 182).    MEDIASTINUM/AXILLAE: No lymphadenopathy or pericardial effusion. Atherosclerotic calcifications of the aortic arch and descending thoracic aorta.    CORONARY ARTERY CALCIFICATION: None.    HEPATOBILIARY: Diffuse hepatic steatosis. Benign hemangioma of the right hepatic lobe. Unremarkable gallbladder.    PANCREAS: Normal.    SPLEEN: Normal.    ADRENAL GLANDS: Normal.    KIDNEYS/BLADDER: Small left renal cysts which require no dedicated follow-up. No hydronephrosis. Unremarkable urinary bladder.    BOWEL: Colonic  diverticulosis. Normal appendix. Percutaneous gastrostomy tube. No bowel obstruction.    LYMPH NODES: Shotty retroperitoneal lymph nodes, nonspecific but may be reactive.    VASCULATURE: Atherosclerotic calcifications of the aortoiliac vessels without evidence of aneurysmal dilatation.    PELVIC ORGANS: Prominent prostate.    MUSCULOSKELETAL: Small to moderate sized umbilical hernia containing fat and a knuckle of small bowel. Right hip arthroplasty. Left femur intramedullary abdiel and screw. Degenerative changes of the left hip. Multilevel degenerative changes of the   cervicothoracic and lumbosacral spine. Dense bones overall, which is nonspecific but can be seen with renal osteodystrophy. No acute osseous abnormality.      Impression    IMPRESSION:    1.  Corresponding with the findings on same day chest radiograph is linear subsegmental atelectasis or scarring of the right lower lobe. There is also mild linear subsegmental atelectasis or scarring of the left lower lobe. No acute focal pulmonary   consolidation otherwise, or pleural effusion.  2.  Diffuse hepatic steatosis, which may be a source of abdominal pain.  3.  Percutaneous gastrostomy tube.  4.  No bowel obstruction.  5.  Colonic diverticulosis, without diverticulitis.

## 2025-07-01 NOTE — PROVIDER NOTIFICATION
07/01/25 0100   Initial Information   Patient Belongings remains with patient   Patient Belongings Remaining with Patient clothing;cell phone/electronics   Did you bring any home meds/supplements to the hospital?  No       A               Admission:  I am responsible for any personal items that are not sent to the safe or pharmacy.  [facility] is not responsible for loss, theft or damage of any property in my possession.    Signature:  _________________________________ Date: _______  Time: _____                                              Staff Signature:  ____________________________ Date: ________  Time: _____      2nd Staff person, if patient is unable/unwilling to sign:    Signature: ________________________________ Date: ________  Time: _____     Discharge:  [facility] has returned all of my personal belongings:    Signature: _________________________________ Date: ________  Time: _____                                          Staff Signature:  ____________________________ Date: ________  Time: _____       =

## 2025-07-01 NOTE — ED NOTES
Patient uses Osmolite 1.2 for his feedings. According to patient's chart he is suppose to be doing 4 cans per day and flushing with 4oz of after each feeding. Per patilibbyn's sister the patient has maybe only done 2 cans per day.

## 2025-07-02 ENCOUNTER — APPOINTMENT (OUTPATIENT)
Dept: OCCUPATIONAL THERAPY | Facility: OTHER | Age: 70
End: 2025-07-02
Payer: COMMERCIAL

## 2025-07-02 ENCOUNTER — APPOINTMENT (OUTPATIENT)
Dept: PHYSICAL THERAPY | Facility: OTHER | Age: 70
End: 2025-07-02
Payer: COMMERCIAL

## 2025-07-02 LAB
ALBUMIN SERPL BCG-MCNC: 3.2 G/DL (ref 3.5–5.2)
ALP SERPL-CCNC: 70 U/L (ref 40–150)
ALT SERPL W P-5'-P-CCNC: 31 U/L (ref 0–70)
ANION GAP SERPL CALCULATED.3IONS-SCNC: 12 MMOL/L (ref 7–15)
AST SERPL W P-5'-P-CCNC: 53 U/L (ref 0–45)
BILIRUB SERPL-MCNC: 0.8 MG/DL
BUN SERPL-MCNC: 13.4 MG/DL (ref 8–23)
CALCIUM SERPL-MCNC: 8.3 MG/DL (ref 8.8–10.4)
CHLORIDE SERPL-SCNC: 100 MMOL/L (ref 98–107)
CREAT SERPL-MCNC: 0.72 MG/DL (ref 0.67–1.17)
EGFRCR SERPLBLD CKD-EPI 2021: >90 ML/MIN/1.73M2
ERYTHROCYTE [DISTWIDTH] IN BLOOD BY AUTOMATED COUNT: 13.8 % (ref 10–15)
FOLATE SERPL-MCNC: 6.2 NG/ML (ref 4.6–34.8)
GLUCOSE SERPL-MCNC: 65 MG/DL (ref 70–99)
HCO3 SERPL-SCNC: 27 MMOL/L (ref 22–29)
HCT VFR BLD AUTO: 39.7 % (ref 40–53)
HGB BLD-MCNC: 13.3 G/DL (ref 13.3–17.7)
HOLD SPECIMEN: NORMAL
MAGNESIUM SERPL-MCNC: 1.7 MG/DL (ref 1.7–2.3)
MCH RBC QN AUTO: 36.5 PG (ref 26.5–33)
MCHC RBC AUTO-ENTMCNC: 33.5 G/DL (ref 31.5–36.5)
MCV RBC AUTO: 109 FL (ref 78–100)
PHOSPHATE SERPL-MCNC: 1.2 MG/DL (ref 2.5–4.5)
PHOSPHATE SERPL-MCNC: 1.5 MG/DL (ref 2.5–4.5)
PLATELET # BLD AUTO: 112 10E3/UL (ref 150–450)
POTASSIUM SERPL-SCNC: 3 MMOL/L (ref 3.4–5.3)
POTASSIUM SERPL-SCNC: 3.7 MMOL/L (ref 3.4–5.3)
POTASSIUM SERPL-SCNC: 3.8 MMOL/L (ref 3.4–5.3)
PROT SERPL-MCNC: 6.5 G/DL (ref 6.4–8.3)
RBC # BLD AUTO: 3.64 10E6/UL (ref 4.4–5.9)
SODIUM SERPL-SCNC: 139 MMOL/L (ref 135–145)
VIT B12 SERPL-MCNC: 555 PG/ML (ref 232–1245)
WBC # BLD AUTO: 6.4 10E3/UL (ref 4–11)

## 2025-07-02 PROCEDURE — 83735 ASSAY OF MAGNESIUM: CPT | Performed by: STUDENT IN AN ORGANIZED HEALTH CARE EDUCATION/TRAINING PROGRAM

## 2025-07-02 PROCEDURE — 250N000011 HC RX IP 250 OP 636: Performed by: INTERNAL MEDICINE

## 2025-07-02 PROCEDURE — 82607 VITAMIN B-12: CPT | Performed by: STUDENT IN AN ORGANIZED HEALTH CARE EDUCATION/TRAINING PROGRAM

## 2025-07-02 PROCEDURE — 250N000011 HC RX IP 250 OP 636: Performed by: STUDENT IN AN ORGANIZED HEALTH CARE EDUCATION/TRAINING PROGRAM

## 2025-07-02 PROCEDURE — 250N000013 HC RX MED GY IP 250 OP 250 PS 637: Performed by: STUDENT IN AN ORGANIZED HEALTH CARE EDUCATION/TRAINING PROGRAM

## 2025-07-02 PROCEDURE — 85027 COMPLETE CBC AUTOMATED: CPT | Performed by: STUDENT IN AN ORGANIZED HEALTH CARE EDUCATION/TRAINING PROGRAM

## 2025-07-02 PROCEDURE — 82565 ASSAY OF CREATININE: CPT | Performed by: INTERNAL MEDICINE

## 2025-07-02 PROCEDURE — 97530 THERAPEUTIC ACTIVITIES: CPT | Mod: GO | Performed by: OCCUPATIONAL THERAPIST

## 2025-07-02 PROCEDURE — 84100 ASSAY OF PHOSPHORUS: CPT | Performed by: STUDENT IN AN ORGANIZED HEALTH CARE EDUCATION/TRAINING PROGRAM

## 2025-07-02 PROCEDURE — 258N000003 HC RX IP 258 OP 636: Performed by: INTERNAL MEDICINE

## 2025-07-02 PROCEDURE — 250N000013 HC RX MED GY IP 250 OP 250 PS 637: Performed by: INTERNAL MEDICINE

## 2025-07-02 PROCEDURE — 84132 ASSAY OF SERUM POTASSIUM: CPT | Performed by: STUDENT IN AN ORGANIZED HEALTH CARE EDUCATION/TRAINING PROGRAM

## 2025-07-02 PROCEDURE — 82040 ASSAY OF SERUM ALBUMIN: CPT | Performed by: STUDENT IN AN ORGANIZED HEALTH CARE EDUCATION/TRAINING PROGRAM

## 2025-07-02 PROCEDURE — 120N000001 HC R&B MED SURG/OB

## 2025-07-02 PROCEDURE — 97116 GAIT TRAINING THERAPY: CPT | Mod: GP

## 2025-07-02 PROCEDURE — 82746 ASSAY OF FOLIC ACID SERUM: CPT | Performed by: STUDENT IN AN ORGANIZED HEALTH CARE EDUCATION/TRAINING PROGRAM

## 2025-07-02 PROCEDURE — 36415 COLL VENOUS BLD VENIPUNCTURE: CPT | Performed by: STUDENT IN AN ORGANIZED HEALTH CARE EDUCATION/TRAINING PROGRAM

## 2025-07-02 RX ORDER — POTASSIUM CHLORIDE 20MEQ/15ML
40 LIQUID (ML) ORAL ONCE
Status: COMPLETED | OUTPATIENT
Start: 2025-07-02 | End: 2025-07-02

## 2025-07-02 RX ORDER — POTASSIUM CHLORIDE 1.5 G/1.58G
40 POWDER, FOR SOLUTION ORAL ONCE
Status: DISCONTINUED | OUTPATIENT
Start: 2025-07-02 | End: 2025-07-02

## 2025-07-02 RX ORDER — POTASSIUM CHLORIDE 20MEQ/15ML
20 LIQUID (ML) ORAL ONCE
Status: COMPLETED | OUTPATIENT
Start: 2025-07-02 | End: 2025-07-02

## 2025-07-02 RX ORDER — POTASSIUM CHLORIDE 1.5 G/1.58G
20 POWDER, FOR SOLUTION ORAL ONCE
Status: DISCONTINUED | OUTPATIENT
Start: 2025-07-02 | End: 2025-07-02

## 2025-07-02 RX ADMIN — OXYCODONE HYDROCHLORIDE 10 MG: 5 TABLET ORAL at 07:45

## 2025-07-02 RX ADMIN — THIAMINE HYDROCHLORIDE 100 MG: 100 INJECTION, SOLUTION INTRAMUSCULAR; INTRAVENOUS at 07:46

## 2025-07-02 RX ADMIN — SODIUM CHLORIDE, SODIUM LACTATE, POTASSIUM CHLORIDE, AND CALCIUM CHLORIDE: .6; .31; .03; .02 INJECTION, SOLUTION INTRAVENOUS at 05:11

## 2025-07-02 RX ADMIN — MULTIVIT AND MINERALS-FERROUS GLUCONATE 9 MG IRON/15 ML ORAL LIQUID 15 ML: at 10:44

## 2025-07-02 RX ADMIN — CLONIDINE HYDROCHLORIDE 0.1 MG: 0.1 TABLET ORAL at 15:58

## 2025-07-02 RX ADMIN — CEFTRIAXONE 2 G: 2 INJECTION, POWDER, FOR SOLUTION INTRAMUSCULAR; INTRAVENOUS at 21:06

## 2025-07-02 RX ADMIN — SODIUM CHLORIDE, SODIUM LACTATE, POTASSIUM CHLORIDE, AND CALCIUM CHLORIDE 1000 ML: .6; .31; .03; .02 INJECTION, SOLUTION INTRAVENOUS at 15:46

## 2025-07-02 RX ADMIN — CLONIDINE HYDROCHLORIDE 0.1 MG: 0.1 TABLET ORAL at 00:44

## 2025-07-02 RX ADMIN — CHLORHEXIDINE GLUCONATE 15 ML: 1.2 RINSE ORAL at 21:07

## 2025-07-02 RX ADMIN — ENOXAPARIN SODIUM 40 MG: 40 INJECTION SUBCUTANEOUS at 09:44

## 2025-07-02 RX ADMIN — OXYCODONE HYDROCHLORIDE 10 MG: 5 TABLET ORAL at 15:57

## 2025-07-02 RX ADMIN — POTASSIUM CHLORIDE 40 MEQ: 20 SOLUTION ORAL at 11:30

## 2025-07-02 RX ADMIN — OXYCODONE HYDROCHLORIDE 10 MG: 5 TABLET ORAL at 02:51

## 2025-07-02 RX ADMIN — POTASSIUM CHLORIDE 20 MEQ: 20 SOLUTION ORAL at 14:30

## 2025-07-02 RX ADMIN — CLONIDINE HYDROCHLORIDE 0.1 MG: 0.1 TABLET ORAL at 07:44

## 2025-07-02 RX ADMIN — POTASSIUM & SODIUM PHOSPHATES POWDER PACK 280-160-250 MG 2 PACKET: 280-160-250 PACK at 16:03

## 2025-07-02 RX ADMIN — CLONIDINE HYDROCHLORIDE 0.1 MG: 0.1 TABLET ORAL at 23:28

## 2025-07-02 RX ADMIN — DIAZEPAM 5 MG: 10 INJECTION, SOLUTION INTRAMUSCULAR; INTRAVENOUS at 23:41

## 2025-07-02 RX ADMIN — CHLORHEXIDINE GLUCONATE 15 ML: 1.2 RINSE ORAL at 10:50

## 2025-07-02 RX ADMIN — HYDROMORPHONE HYDROCHLORIDE 0.2 MG: 0.2 INJECTION, SOLUTION INTRAMUSCULAR; INTRAVENOUS; SUBCUTANEOUS at 01:00

## 2025-07-02 RX ADMIN — POTASSIUM & SODIUM PHOSPHATES POWDER PACK 280-160-250 MG 2 PACKET: 280-160-250 PACK at 19:54

## 2025-07-02 RX ADMIN — DIAZEPAM 10 MG: 5 TABLET ORAL at 22:47

## 2025-07-02 RX ADMIN — FAMOTIDINE 20 MG: 20 TABLET, FILM COATED ORAL at 21:07

## 2025-07-02 RX ADMIN — HYDROMORPHONE HYDROCHLORIDE 0.2 MG: 0.2 INJECTION, SOLUTION INTRAMUSCULAR; INTRAVENOUS; SUBCUTANEOUS at 05:11

## 2025-07-02 RX ADMIN — DIAZEPAM 10 MG: 10 INJECTION, SOLUTION INTRAMUSCULAR; INTRAVENOUS at 16:18

## 2025-07-02 RX ADMIN — AZITHROMYCIN MONOHYDRATE 500 MG: 500 INJECTION, POWDER, LYOPHILIZED, FOR SOLUTION INTRAVENOUS at 03:07

## 2025-07-02 RX ADMIN — FAMOTIDINE 20 MG: 20 TABLET, FILM COATED ORAL at 10:42

## 2025-07-02 RX ADMIN — DIAZEPAM 10 MG: 5 TABLET ORAL at 21:07

## 2025-07-02 RX ADMIN — FOLIC ACID 1 MG: 5 INJECTION, SOLUTION INTRAMUSCULAR; INTRAVENOUS; SUBCUTANEOUS at 07:51

## 2025-07-02 RX ADMIN — SERTRALINE HYDROCHLORIDE 25 MG: 25 TABLET ORAL at 10:42

## 2025-07-02 RX ADMIN — POTASSIUM & SODIUM PHOSPHATES POWDER PACK 280-160-250 MG 2 PACKET: 280-160-250 PACK at 11:26

## 2025-07-02 ASSESSMENT — ACTIVITIES OF DAILY LIVING (ADL)
ADLS_ACUITY_SCORE: 57
ADLS_ACUITY_SCORE: 59
ADLS_ACUITY_SCORE: 57

## 2025-07-02 NOTE — PROGRESS NOTES
Clinical Nutrition / Initial Assessment     Reason for Assessment:  RN request/referral    Assessment:   Client History:  pt admitted with pneumonia. He has a hx of head and neck cancer and has been on tube feedings for ~ 4 years now with NO oral intake. Have visited with him and his sister in the clinic on 5/22/25. He reported then that he has a hard time taking in more than 1 can of food at a time. He had reduced his intakes to 2 cans daily due to wt gain. Discussed at that meeting to increase feedings of Osmolite 1.2 to 4 cans daily with minimum of 4 oz water flushes after each with an additional 1 oz water added to each can. Discussed this with him today and he mentioned that he just now increased to the 4 cans daily (yesterday). Here, the feedings are scheduled every 6 hours, starting at 0230. He stated he can do this feeding at home as he does not sleep for much more than an hour at a time. Spacing out his feedings may be best tolerated as he feels full quickly after just 1 can per his report. He stated he cannot tolerate a thicker formula either so will continue with the Osmolite 1.2 from home (case in his room). Encouraged him to discuss any issues or potential problems with staff. He agreed.   Diet Order:  NPO, Osmolite 1.2 at 4 cans daily with minimum 4 oz water flush after each feeding  Estimated nutritional needs based on:  ideal body weight 62 kg / 136 lbs   Estimated energy needs:  7086-3767 kcal/day (20-25 kcal/kg)  Estimated protein needs:  62 gm/day (1 g/kg)  Estimated fluid needs:  4873-6437 ml/day (1 ml/kcal)    Malnutrition Criteria:  (Need to have 2 indicators to qualify recommendation)  Energy Intake:  Social or Environmental Moderate: < 75% of estimated energy requirement for >/= 3 months  Interpretation of Weight Loss:  No significant weight loss  Physical Findings:  Chronic Fluid Accumulation:  mild to moderate  Reduced  Strength:  Not Measured    Recommended Nutrition  Diagnosis:   Moderate Malnutrition in the context of social or environmental Circumstances - based on AND/ASPEN Clinical Characterstics of Malnutrition May 2012  Malnutrition:    - Level of malnutrition: Moderate       Nutrition Education: Nutrition education will be provided as appropriate.    Intervention:  Nutrition Prescription:     Nutrition Intervention(s): enteral feeding only, NPO  1. Enteral and Parental Nutrition: Osmolite 1.2 at 4 cans daily to provide: 1140 kcal, 53 g protein, 975 ml fluids.     Nutrition Goal(s):  1. Pt will tolerate feedings as ordered  2. Pt will not have s/s aspiration     Monitoring and Evaluation:   Enteral Nutrition Intake    Discharge Recommendation:   Nutrition Discharge Planning  Osmolite 1.2 at 4 cans daily to provide: 1140 kcal, 53 g protein, 975 ml fluids. Same schedule as here as long as tolerated: 0230, 0830, 1230, 1830. Flushes of tap water at least 4 oz after each feeding.     RD will reassess in within 1-5 days or sooner.  Sultana Priest RD on 7/2/2025 at 10:50 AM

## 2025-07-02 NOTE — PROGRESS NOTES
[facility]    Medicine Progress Note - Hospitalist Service    Date of Admission:  6/30/2025    Assessment & Plan      Prasanna Alonso is a 70 year old man with a past medical history of neck cancer status post resection and G-tube placement, alcohol use disorder and chronic pain who was admitted on 6/30/2025 due to acute hypoxic respiratory failure from a right sided presumed aspiration pneumonia.    Patient presented emergency department the day of admission due to shortness of breath and abdominal distention and overall generalized weakness.  H he reports having fallen at home and was unable to get up.  Workup in the emergency department was remarkable for sats to 82% on room air and imaging remarkable for scarring/atelectasis of the left and right lower lobes and hepatic steatosis.  He was subsequently admitted to the hospital for presumed aspiration pneumonia and hypoxic respiratory failure.     Right sided aspiration pneumonia  He presented after a fall with weakness but he actually denies any respiratory symptoms. Imaging, however, shows right sided infiltrates that could be pneumonia or pneumonitis. Given that he has a G-gube, the possibility of aspiration is raised though could be chronic scarring in lungs.  Will restart tube feeds and reassess his breathing.  Anticipate TCU on discharge.  - c/w ceftriaxone and azithromycin  - follow-up blood and sputum cultures  - c/w supplemental oxygen as needed  - restart tube feeds  - Low threshold for CT PE study, ECHO    G-tube dependent  History of head and neck cancer s/p radical surgery, chemotherapy and radiation   Hypomagnesemia  Hypokalemia  - Restart tube feeds 4 times daily  -magnesium, potassium replacement protocol      EtOH abuse  He admits that he uses his J-tube to ingest alcohol every day. He ingests about 3-4 daily. His intake is probably related to his long-standing depression.   - CIWA protocol initiated with IV diazepam  -  c/w IV folic acid and thiamine  - Restart sertraline    Falls, Generalized weakness  Assessment: Mechanical fall, reports no injuries, but cannot get up at home.  Will add on a CK to ensure he does not develop rhabdomyolysis  Plan:   -add on CK normal      Macrocytic anemia  Thrombocytopenia  Assessment: Previous B12 and folate levels were normal though sometime ago.  Suspect related to his alcoholism.  WBC normal this hospitalization.  Likely due to alcohol use  Plan:   - Vitamin B12, folate levels normal, likely related to bone marrow suppression from alcohol    Depression  His PCP is concerned about his depression which is likely caused mostly by his medical conditions. He was recently started on sertraline.   - c/w sertraline once confirmed    LE Edema  Significant edema on admission but on my evaluation after his legs have been elevated there is only slight edema.  Echocardiogram normal in March 2025.  UA on admission without significant proteinuria.  Plan:  -Hold on diuretics for now, elevate legs as able            Diet: NPO for Medical/Clinical Reasons Except for: No Exceptions  Adult Formula Bolus Feeding: Other; Osmolite 1.2; Route: Gastrostomy; 4 times daily; Volume per Bolus: 1; Can(s)/ Carton(s)    DVT Prophylaxis: Enoxaparin (Lovenox) SQ  [place] Catheter: Not present  Lines: None     Cardiac Monitoring: None  Code Status: Full Code      Clinically Significant Risk Factors        # Hypokalemia: Lowest K = 3 mmol/L in last 2 days, will replace as needed      # Hypomagnesemia: Lowest Mg = 1.2 mg/dL in last 2 days, will replace as needed   # Hypoalbuminemia: Lowest albumin = 3.1 g/dL at 7/1/2025  3:23 AM, will monitor as appropriate   # Thrombocytopenia: Lowest platelets = 112 in last 2 days, will monitor for bleeding               # Moderate Malnutrition: based on nutrition assessment and treatment provided per dietitian's recommendations., PRESENT ON ADMISSION          Social REEL Qualified of Health     Depression: At risk (6/12/2025)    PHQ-2     PHQ-2 Score: 6   Tobacco Use: Medium Risk (6/30/2025)    Patient History     Smoking Tobacco Use: Former     Smokeless Tobacco Use: Never   Alcohol Use: Alcohol Misuse (3/11/2025)    AUDIT-C     Frequency of Alcohol Consumption: 4 or more times a week     Average Number of Drinks: 5 or 6     Frequency of Binge Drinking: Never          Disposition Plan   Medically Ready for Discharge: Likely 1-2 additional days, ideally will wean off oxygen.              Ruben English MD  Hospitalist Service  [facility]  Securely message with [employer] (more info)  Text page via [facility] Paging/Directory   ______________________________________________________________________    Interval History   Feeling better  Updated sister on phone when rounding.   No chest pain  Oxygen needs improved.   Planning on TCU on discharge    Physical Exam   Vital Signs: Temp: 98.5  F (36.9  C) Temp src: Tympanic BP: 133/79 Pulse: 77   Resp: 18 SpO2: 94 % O2 Device: None (Room air) Oxygen Delivery: 1/2 LPM  Weight: 187 lbs 6.4 oz  General: Pleasant 70-year-old man lying in bed no acute distress  HEENT: Chronic scarring from radiation and surgery  CV: Regular rate and rhythm, mild pitting edema to just above the ankle bilaterally  Pulmonary: Clear to auscultation, diminished bilateral bases  GI: Soft    Medical Decision Making             Data     I have personally reviewed the following data over the past 24 hrs:    6.4  \   13.3   / 112 (L)     139 100 13.4 /  65 (L)   3.0 (L) 27 0.72 \     ALT: 31 AST: 53 (H) AP: 70 TBILI: 0.8   ALB: 3.2 (L) TOT PROTEIN: 6.5 LIPASE: N/A       Imaging results reviewed over the past 24 hrs:   No results found for this or any previous visit (from the past 24 hours).

## 2025-07-02 NOTE — PROGRESS NOTES
Reassessed patient's CIWAs, now 7. Patient states that he feels less agitated/anxious; tremors still present, but agitation/anxiety visibly lowered. Patient is currently cooperative and pleasant. Patient disclosed that he would have 3-4 60ml vials of vodka throughout the day, placed into his g-tube. He thinks that his last drink was Sunday or Monday. He shared that he doesn't really remember what happened the last few hours but that he's starting to feel better, less anxious. Patient remains on 1/2L NC, satting at 95%; leaving in place due to previous desaturation.    Spoke with patient's sister Radha on the phone, at patient's request. Radha aware of increased agitation and CIWA score and in agreement with treatment plan.    BP (!) 157/82 (BP Location: Left arm)   Pulse 79   Temp 98.3  F (36.8  C) (Tympanic)   Resp 20   Wt 85 kg (187 lb 6.4 oz)   SpO2 94%   BMI 31.18 kg/m

## 2025-07-02 NOTE — PROGRESS NOTES
:    Spoke with Susana at [facility] and they continue to screen patient.    Spoke with Christy at [facility] and they are continuing to screen patient.    Updated patient about referral status. We will look into other options tomorrow if need.     PATRICK Govea on 7/2/2025 at 4:21 PM

## 2025-07-02 NOTE — PROVIDER NOTIFICATION
Patient appearing more anxious/agitated this afternoon, and BP elevated. Reassessed CIWA, score 8. Per protocol, gave 5mg IV Valium.     07/02/25 0800 07/02/25 1300 07/02/25 1608   [facility] Withdrawal Assessment of Alcohol, Revised   CIWA-Ar Total 4 6 8     Estela Beltrán RN on 7/2/2025 at 4:34 PM

## 2025-07-02 NOTE — PROGRESS NOTES
07/02/25 4985   Appointment Info   Signing Clinician's Name / Credentials (OT) Gerri Huynh OTR/L   Therapeutic Activities   Therapeutic Activity Minutes (45989) 20   Symptoms noted during/after treatment fatigue;shortness of breath   Treatment Detail/Skilled Intervention Pt was up in chair upon OT arrival, dressed and self cares completed, plan to assess these further 7/3/25. Pt was agreeable to ambulate in room and hallway. Pt is now on room air, ambulated 250 feet with FWW and CGA/Min assist of 1 for safety. Pt did become fatigued and SOB at midway point and needed a standing rest break with cues for deep breathing. Pt returned to room and O2 sats on room air were 92%.   OT Discharge Planning   OT Plan progress ADL's and activity tolerance   OT Discharge Recommendation (DC Rec) Transitional Care Facility   OT Rationale for DC Rec Pt would greatly benefit from STR to address safety and independence needed to return to apt as previous   OT Brief overview of current status see above for treatment details   Total Session Time   Timed Code Treatment Minutes 20   Total Session Time (sum of timed and untimed services) 20
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
## 2025-07-02 NOTE — PROGRESS NOTES
Patient successfully weaned to room air. Oxygen saturation 94-95% on RA before and after ambulating in the hallway. Patient ambulated appx 300ft with asst 1 & walker and reports feeling SOB, but oxygen saturation stable 10 minutes after.    Estela Beltrán RN on 7/2/2025 at 12:50 PM

## 2025-07-02 NOTE — PROGRESS NOTES
07/02/25 2671   Appointment Info   Signing Clinician's Name / Credentials (PT) [employer] MPT   Interventions   Interventions Quick Adds Gait Training;Therapeutic Activity   Therapeutic Activity   Treatment Detail/Skilled Intervention sit to stand and stand pivot transfers with CGA of 1 and use of Fww   Gait Training   Symptoms Noted During/After Treatment (Gait Training) fatigue;shortness of breath   Treatment Detail/Skilled Intervention ambulation in hallway   Distance in Feet 250   [place] Level (Gait Training) contact guard   Physical Assistance Level (Gait Training) 1 person assist   Weight Bearing (Gait Training) full weight-bearing   Assistive Device (Gait Training) rolling walker   Pattern Analysis (Gait Training) swing-through gait   Gait Analysis Deviations decreased stride length;increased stride width   Impairments (Gait Analysis/Training) balance impaired;strength decreased   PT Discharge Planning   PT Plan Continue PT   PT Discharge Recommendation (DC Rec) Transitional Care Facility   PT Rationale for DC Rec to promote strength, stability and safe mobility prior to returning home   PT Brief overview of current status Quiet patient requiring assistance with mobilities using a Fww for gait; fatigue presently limiting activity and gait tolerance, however, improvement demonstrated today; O2 sats remained above 90% following gait eventhough patient was SOB; he will benefit from continued PT to promote strength prior to his returning home   PT Equipment Needed at Discharge walker, rolling

## 2025-07-02 NOTE — PROGRESS NOTES
Pt received on 4L NC. During shift RN weaned Pt to 3L.  No PRN treatments during shift.  Respiratory will continue to monitor and provide support.    Haleigh Berry, RT

## 2025-07-02 NOTE — PROGRESS NOTES
07/01/25 1121   Appointment Info   Signing Clinician's Name / Credentials (PT) [employer] MPT   Living Environment   People in Home alone   Current Living Arrangements apartment   Home Accessibility no concerns;other (see comments)  (elevator)   Transportation Anticipated family or friend will provide;health plan transportation;public transportation   Self-Care   Usual Activity Tolerance moderate   Current Activity Tolerance fair   Equipment Currently Used at Home none   Fall history within last six months yes   Number of times patient has fallen within last six months 1   General Information   Referring Physician Amador   Patient/Family Therapy Goals Statement (PT) short term rehab prior to returning home   Existing Precautions/Restrictions fall   Weight-Bearing Status - LLE full weight-bearing   Weight-Bearing Status - RLE full weight-bearing   Cognition   Affect/Mental Status (Cognition) WFL   Orientation Status (Cognition) oriented to;person;place   Follows Commands (Cognition) WFL   Pain Assessment   Patient Currently in Pain No   Integumentary/Edema   Integumentary/Edema Comments mild edema in feet/ankles noted   Posture    Posture Not impaired   Range of Motion (ROM)   Range of Motion ROM is WFL   Strength (Manual Muscle Testing)   Strength (Manual Muscle Testing) strength is WFL   Strength Comments however, patient fatigues with activity   Bed Mobility   Impairments Contributing to Impaired Bed Mobility decreased strength   Comment, (Bed Mobility) supine<>sit with minimal assist of 1-2   Transfers   Impairments Contributing to Impaired Transfers decreased strength   Comment, (Transfers) sit to stand and stand pivot with minimal assist of 1 and use of Fww   Gait/Stairs (Locomotion)   [place] Level (Gait) contact guard   Assistive Device (Gait) walker, front-wheeled   Distance in Feet (Gait) 20   Pattern (Gait) step-through   Balance   Balance Comments good with Fww   Sensory Examination   Sensory  Perception WFL   Sensory Perception Comments bilateral hand tremors   Coordination   Coordination no deficits were identified   Muscle Tone   Muscle Tone no deficits were identified   Clinical Impression   Criteria for Skilled Therapeutic Intervention Yes, treatment indicated   PT Diagnosis (PT) impaired mobility   Influenced by the following impairments fatigue and weakness   Functional limitations due to impairments activity/gait tolerance and stability   Clinical Presentation (PT Evaluation Complexity) evolving   Clinical Decision Making (Complexity) low complexity   Planned Therapy Interventions (PT) bed mobility training;gait training;transfer training   Risk & Benefits of therapy have been explained evaluation/treatment results reviewed;risks/benefits reviewed;patient;sibling   PT Total Evaluation Time   PT Stephen, Low Complexity Minutes (12835) 15   Physical Therapy Goals   PT Frequency Daily   PT Predicted Duration/Target Date for Goal Attainment 07/07/25   PT Goals Bed Mobility;Transfers;Gait   PT Discharge Planning   PT Plan Continue PT   PT Discharge Recommendation (DC Rec) Transitional Care Facility   PT Rationale for DC Rec to promote strength, stability and safe mobility prior to returning home   PT Brief overview of current status Quiet patient requiring assistance with mobilities using a Fww for gait; fatigue presently limiting activity and gait tolerance; he will benefit from continued PT to promote strength prior to his returning home   PT Equipment Needed at Discharge walker, rolling   Physical Therapy Time and Intention   Total Session Time (sum of timed and untimed services) 15

## 2025-07-02 NOTE — PLAN OF CARE
Goal Outcome Evaluation: Elevated BP, CIWA 12, valium given, Sats dropped to 89% with increased confusion, placed back on 1/2 LPM NC, currently at 94%. Pain 6/10, PRN Oxy was given. Assist of 1 with walker and gait belt, close monitoring necessary for safety due to impulsivity, bed alarm on.   Blood pressure (!) 157/82, pulse 79, temperature 98.3  F (36.8  C), temperature source Tympanic, resp. rate 20, weight 85 kg (187 lb 6.4 oz), SpO2 94%.       Plan of Care Reviewed With: patient    Overall Patient Progress: no change    Outcome Evaluation: Intermittent confusion, Elevated BP, impulsive and restless, frequently gets up without assistance, pain 5/10, CIWA increasing currently 12, PRN valium given

## 2025-07-02 NOTE — PROGRESS NOTES
Interdisciplinary Discharge Planning Note    Anticipated Discharge Date: 4/5    Anticipated Discharge Location: SNF    Clinical Needs Before Discharge:  stable functional status    Treatment Needs After Discharge:  rehab (PT, OT, ST)    Potential Barriers to Discharge: Finding Placement    PATRICK Govea  7/2/2025,  4:19 PM

## 2025-07-02 NOTE — PROGRESS NOTES
Received report from Melyssa WILBURN. Resuming plan of care.    SAFETY CHECKLIST  ID Bands and Risk clasps correct and in place (DNR, Fall risk, Allergy, Latex, Limb):  Yes  All Lines Reconciled and labeled correctly: Yes  Whiteboard updated:Yes  Environmental interventions: Yes  JUAN AGUAYO RN on 7/2/2025 at 7:23 AM

## 2025-07-02 NOTE — PLAN OF CARE
Goal Outcome Evaluation: VSS, Aox4, CIWA 4, pain 5/10, PRN Oxycodone given, not call light appropriate but agreeable and bed alarm on. Oxygen at 1/2 LPM NC, SPO2 94%, trailing room air. Mild edema on right ankle, trace on left. Continent of bowel and urine, assist of 1 with belt and walker. Tolerated morning tube feeding well.    Electrolytes low, replaced K+ and phosphorus via G tube.    Blood pressure 133/76, pulse 72, temperature 98.4  F (36.9  C), temperature source Tympanic, resp. rate 16, weight 85 kg (187 lb 6.4 oz), SpO2 94%.        Plan of Care Reviewed With: patient    Overall Patient Progress: improving  Outcome Evaluation: Alert and Orientated x4, VSS, CIWA 4, impulsive but agreeable. Pain 5/10, PRN medications utilized. Electrolytes low, replaced.

## 2025-07-03 LAB
ALBUMIN SERPL BCG-MCNC: 2.7 G/DL (ref 3.5–5.2)
ALP SERPL-CCNC: 64 U/L (ref 40–150)
ALT SERPL W P-5'-P-CCNC: 28 U/L (ref 0–70)
ANION GAP SERPL CALCULATED.3IONS-SCNC: 8 MMOL/L (ref 7–15)
AST SERPL W P-5'-P-CCNC: 51 U/L (ref 0–45)
BACTERIA SPEC CULT: NORMAL
BACTERIA SPEC CULT: NORMAL
BILIRUB SERPL-MCNC: 0.7 MG/DL
BUN SERPL-MCNC: 9.9 MG/DL (ref 8–23)
CALCIUM SERPL-MCNC: 7.8 MG/DL (ref 8.8–10.4)
CHLORIDE SERPL-SCNC: 104 MMOL/L (ref 98–107)
CREAT SERPL-MCNC: 0.62 MG/DL (ref 0.67–1.17)
CREAT SERPL-MCNC: 0.65 MG/DL (ref 0.67–1.17)
EGFRCR SERPLBLD CKD-EPI 2021: >90 ML/MIN/1.73M2
EGFRCR SERPLBLD CKD-EPI 2021: >90 ML/MIN/1.73M2
ERYTHROCYTE [DISTWIDTH] IN BLOOD BY AUTOMATED COUNT: 13.5 % (ref 10–15)
GLUCOSE SERPL-MCNC: 98 MG/DL (ref 70–99)
HCO3 SERPL-SCNC: 25 MMOL/L (ref 22–29)
HCT VFR BLD AUTO: 37.4 % (ref 40–53)
HGB BLD-MCNC: 12.6 G/DL (ref 13.3–17.7)
MAGNESIUM SERPL-MCNC: 1.3 MG/DL (ref 1.7–2.3)
MAGNESIUM SERPL-MCNC: 2.3 MG/DL (ref 1.7–2.3)
MCH RBC QN AUTO: 37 PG (ref 26.5–33)
MCHC RBC AUTO-ENTMCNC: 33.7 G/DL (ref 31.5–36.5)
MCV RBC AUTO: 110 FL (ref 78–100)
PHOSPHATE SERPL-MCNC: 1.9 MG/DL (ref 2.5–4.5)
PHOSPHATE SERPL-MCNC: 2 MG/DL (ref 2.5–4.5)
PHOSPHATE SERPL-MCNC: 2.4 MG/DL (ref 2.5–4.5)
PLATELET # BLD AUTO: 104 10E3/UL (ref 150–450)
POTASSIUM SERPL-SCNC: 3.6 MMOL/L (ref 3.4–5.3)
PROT SERPL-MCNC: 5.6 G/DL (ref 6.4–8.3)
RBC # BLD AUTO: 3.41 10E6/UL (ref 4.4–5.9)
SODIUM SERPL-SCNC: 137 MMOL/L (ref 135–145)
WBC # BLD AUTO: 5 10E3/UL (ref 4–11)

## 2025-07-03 PROCEDURE — 250N000011 HC RX IP 250 OP 636: Performed by: STUDENT IN AN ORGANIZED HEALTH CARE EDUCATION/TRAINING PROGRAM

## 2025-07-03 PROCEDURE — 250N000013 HC RX MED GY IP 250 OP 250 PS 637: Performed by: STUDENT IN AN ORGANIZED HEALTH CARE EDUCATION/TRAINING PROGRAM

## 2025-07-03 PROCEDURE — 250N000011 HC RX IP 250 OP 636: Performed by: INTERNAL MEDICINE

## 2025-07-03 PROCEDURE — 250N000013 HC RX MED GY IP 250 OP 250 PS 637: Performed by: INTERNAL MEDICINE

## 2025-07-03 PROCEDURE — 258N000003 HC RX IP 258 OP 636: Performed by: INTERNAL MEDICINE

## 2025-07-03 PROCEDURE — 84155 ASSAY OF PROTEIN SERUM: CPT | Performed by: STUDENT IN AN ORGANIZED HEALTH CARE EDUCATION/TRAINING PROGRAM

## 2025-07-03 PROCEDURE — 85027 COMPLETE CBC AUTOMATED: CPT | Performed by: STUDENT IN AN ORGANIZED HEALTH CARE EDUCATION/TRAINING PROGRAM

## 2025-07-03 PROCEDURE — 120N000001 HC R&B MED SURG/OB

## 2025-07-03 PROCEDURE — 83735 ASSAY OF MAGNESIUM: CPT | Performed by: STUDENT IN AN ORGANIZED HEALTH CARE EDUCATION/TRAINING PROGRAM

## 2025-07-03 PROCEDURE — 84100 ASSAY OF PHOSPHORUS: CPT | Performed by: STUDENT IN AN ORGANIZED HEALTH CARE EDUCATION/TRAINING PROGRAM

## 2025-07-03 PROCEDURE — 36415 COLL VENOUS BLD VENIPUNCTURE: CPT | Performed by: STUDENT IN AN ORGANIZED HEALTH CARE EDUCATION/TRAINING PROGRAM

## 2025-07-03 RX ORDER — MAGNESIUM SULFATE HEPTAHYDRATE 40 MG/ML
4 INJECTION, SOLUTION INTRAVENOUS ONCE
Status: COMPLETED | OUTPATIENT
Start: 2025-07-03 | End: 2025-07-03

## 2025-07-03 RX ORDER — FUROSEMIDE 10 MG/ML
20 INJECTION INTRAMUSCULAR; INTRAVENOUS ONCE
Status: COMPLETED | OUTPATIENT
Start: 2025-07-03 | End: 2025-07-03

## 2025-07-03 RX ADMIN — MULTIVIT AND MINERALS-FERROUS GLUCONATE 9 MG IRON/15 ML ORAL LIQUID 15 ML: at 09:58

## 2025-07-03 RX ADMIN — MAGNESIUM SULFATE HEPTAHYDRATE 4 G: 4 INJECTION, SOLUTION INTRAVENOUS at 07:46

## 2025-07-03 RX ADMIN — AZITHROMYCIN MONOHYDRATE 500 MG: 500 INJECTION, POWDER, LYOPHILIZED, FOR SOLUTION INTRAVENOUS at 03:59

## 2025-07-03 RX ADMIN — CHLORHEXIDINE GLUCONATE 15 ML: 1.2 RINSE ORAL at 09:59

## 2025-07-03 RX ADMIN — SERTRALINE HYDROCHLORIDE 25 MG: 25 TABLET ORAL at 09:59

## 2025-07-03 RX ADMIN — POTASSIUM & SODIUM PHOSPHATES POWDER PACK 280-160-250 MG 2 PACKET: 280-160-250 PACK at 16:38

## 2025-07-03 RX ADMIN — DIAZEPAM 5 MG: 10 INJECTION, SOLUTION INTRAMUSCULAR; INTRAVENOUS at 03:58

## 2025-07-03 RX ADMIN — DIAZEPAM 5 MG: 10 INJECTION, SOLUTION INTRAMUSCULAR; INTRAVENOUS at 05:39

## 2025-07-03 RX ADMIN — CLONIDINE HYDROCHLORIDE 0.1 MG: 0.1 TABLET ORAL at 23:55

## 2025-07-03 RX ADMIN — FUROSEMIDE 20 MG: 10 INJECTION, SOLUTION INTRAMUSCULAR; INTRAVENOUS at 07:46

## 2025-07-03 RX ADMIN — CLONIDINE HYDROCHLORIDE 0.1 MG: 0.1 TABLET ORAL at 16:38

## 2025-07-03 RX ADMIN — CEFTRIAXONE 2 G: 2 INJECTION, POWDER, FOR SOLUTION INTRAMUSCULAR; INTRAVENOUS at 21:26

## 2025-07-03 RX ADMIN — DIAZEPAM 5 MG: 10 INJECTION, SOLUTION INTRAMUSCULAR; INTRAVENOUS at 02:13

## 2025-07-03 RX ADMIN — POTASSIUM & SODIUM PHOSPHATES POWDER PACK 280-160-250 MG 2 PACKET: 280-160-250 PACK at 09:59

## 2025-07-03 RX ADMIN — FAMOTIDINE 20 MG: 20 TABLET, FILM COATED ORAL at 21:26

## 2025-07-03 RX ADMIN — CHLORHEXIDINE GLUCONATE 15 ML: 1.2 RINSE ORAL at 21:33

## 2025-07-03 RX ADMIN — POTASSIUM & SODIUM PHOSPHATES POWDER PACK 280-160-250 MG 2 PACKET: 280-160-250 PACK at 04:58

## 2025-07-03 RX ADMIN — DIAZEPAM 5 MG: 10 INJECTION, SOLUTION INTRAMUSCULAR; INTRAVENOUS at 01:04

## 2025-07-03 RX ADMIN — FAMOTIDINE 20 MG: 20 TABLET, FILM COATED ORAL at 09:59

## 2025-07-03 RX ADMIN — THIAMINE HYDROCHLORIDE 100 MG: 100 INJECTION, SOLUTION INTRAMUSCULAR; INTRAVENOUS at 07:45

## 2025-07-03 RX ADMIN — POTASSIUM & SODIUM PHOSPHATES POWDER PACK 280-160-250 MG 2 PACKET: 280-160-250 PACK at 01:35

## 2025-07-03 RX ADMIN — FOLIC ACID 1 MG: 5 INJECTION, SOLUTION INTRAMUSCULAR; INTRAVENOUS; SUBCUTANEOUS at 07:45

## 2025-07-03 RX ADMIN — POTASSIUM & SODIUM PHOSPHATES POWDER PACK 280-160-250 MG 2 PACKET: 280-160-250 PACK at 23:55

## 2025-07-03 RX ADMIN — CLONIDINE HYDROCHLORIDE 0.1 MG: 0.1 TABLET ORAL at 07:46

## 2025-07-03 RX ADMIN — POTASSIUM & SODIUM PHOSPHATES POWDER PACK 280-160-250 MG 2 PACKET: 280-160-250 PACK at 20:41

## 2025-07-03 RX ADMIN — DIAZEPAM 5 MG: 10 INJECTION, SOLUTION INTRAMUSCULAR; INTRAVENOUS at 08:43

## 2025-07-03 RX ADMIN — OXYCODONE HYDROCHLORIDE 10 MG: 5 TABLET ORAL at 13:32

## 2025-07-03 ASSESSMENT — ACTIVITIES OF DAILY LIVING (ADL)
ADLS_ACUITY_SCORE: 64
ADLS_ACUITY_SCORE: 64
ADLS_ACUITY_SCORE: 63
ADLS_ACUITY_SCORE: 63
ADLS_ACUITY_SCORE: 60
ADLS_ACUITY_SCORE: 63
ADLS_ACUITY_SCORE: 59
ADLS_ACUITY_SCORE: 63
ADLS_ACUITY_SCORE: 64
ADLS_ACUITY_SCORE: 63
ADLS_ACUITY_SCORE: 64
ADLS_ACUITY_SCORE: 59
ADLS_ACUITY_SCORE: 63
ADLS_ACUITY_SCORE: 64
ADLS_ACUITY_SCORE: 63
ADLS_ACUITY_SCORE: 60
ADLS_ACUITY_SCORE: 59
ADLS_ACUITY_SCORE: 64
ADLS_ACUITY_SCORE: 63
ADLS_ACUITY_SCORE: 59
ADLS_ACUITY_SCORE: 63

## 2025-07-03 NOTE — PROGRESS NOTES
SAFETY CHECKLIST  ID Bands and Risk clasps correct and in place (DNR, Fall risk, Allergy, Latex, Limb):  Yes  All Lines Reconciled and labeled correctly: Yes  Whiteboard updated:Yes  Environmental interventions: Yes  Verify Tele #:  RAY Sloan RN on 7/2/2025 at 7:26 PM

## 2025-07-03 NOTE — PROGRESS NOTES
Interdisciplinary Discharge Planning Note    Anticipated Discharge Date: 7/7    Anticipated Discharge Location: [facility]    Clinical Needs Before Discharge:  CIWA score of 0 and medically cleared    Treatment Needs After Discharge:  rehab (PT, OT, ST)    Potential Barriers to Discharge: None identified at this time     PATRICK Govea  7/3/2025,  12:09 PM

## 2025-07-03 NOTE — PROGRESS NOTES
SAFETY CHECKLIST  ID Bands and Risk clasps correct and in place (DNR, Fall risk, Allergy, Latex, Limb):  Yes  All Lines Reconciled and labeled correctly: Yes  Whiteboard updated:Yes  Environmental interventions: Yes

## 2025-07-03 NOTE — PROGRESS NOTES
SAFETY CHECKLIST  ID Bands and Risk clasps correct and in place (DNR, Fall risk, Allergy, Latex, Limb):  Yes  All Lines Reconciled and labeled correctly: Yes  Whiteboard updated:Yes  Environmental interventions: Yes  Verify Tele #:  RAY Yi RN on 7/2/2025 at 19:30

## 2025-07-03 NOTE — PLAN OF CARE
Goal Outcome Evaluation: Patient had CIWA of 14 this AM valium given. CIWA since have been 3-4, no intervention needed. Up and ambulating with an assist of one with walker. J-tube is patent, flushed per orders. Trace edema in lower extremities. Patient has been on room air for this shift.       Plan of Care Reviewed With: patient    Overall Patient Progress: improvingOverall Patient Progress: improving

## 2025-07-03 NOTE — PROGRESS NOTES
[facility]    Medicine Progress Note - Hospitalist Service    Date of Admission:  6/30/2025    Assessment & Plan      Prasanna Alonso is a 70 year old man with a past medical history of neck cancer status post resection and G-tube placement, alcohol use disorder and chronic pain who was admitted on 6/30/2025 due to acute hypoxic respiratory failure from a right sided presumed aspiration pneumonia.    Patient presented emergency department the day of admission due to shortness of breath and abdominal distention and overall generalized weakness.  H he reports having fallen at home and was unable to get up.  Workup in the emergency department was remarkable for sats to 82% on room air and imaging remarkable for scarring/atelectasis of the left and right lower lobes and hepatic steatosis.  He was subsequently admitted to the hospital for presumed aspiration pneumonia and hypoxic respiratory failure. He unfortunately developed alcohol withdrawal.    Right sided aspiration pneumonia  He presented after a fall with weakness but he actually denies any respiratory symptoms. Imaging, however, shows right sided infiltrates that could be pneumonia or pneumonitis. Given that he has a G-gube, the possibility of aspiration is raised though could be chronic scarring in lungs.  Will restart tube feeds and reassess his breathing.  Anticipate TCU on discharge.  - c/w ceftriaxone and azithromycin  - follow-up blood and sputum cultures  - c/w supplemental oxygen as needed  - restart tube feeds  - Low threshold for CT PE study, ECHO    G-tube dependent  History of head and neck cancer s/p radical surgery, chemotherapy and radiation   Hypomagnesemia  Hypokalemia  - Restart tube feeds 4 times daily  -magnesium, potassium replacement protocol      EtOH abuse  Alcohol Withdrawal  He admits that he uses his J-tube to ingest alcohol every day. He ingests about 3-4 daily. Last drink likely 6/30. His intake is probably  related to his long-standing depression. Withdrawal started 7/2/25  - CIWA protocol initiated with IV diazepam  - c/w IV folic acid and thiamine  - Restart sertraline    Falls, Generalized weakness  Assessment: Mechanical fall, reports no injuries, but cannot get up at home.  Will add on a CK to ensure he does not develop rhabdomyolysis  Plan:   -add on CK normal      Macrocytic anemia  Thrombocytopenia  Assessment: Previous B12 and folate levels were normal though sometime ago.  Suspect related to his alcoholism.  WBC normal this hospitalization.  Likely due to alcohol use  Plan:   - Vitamin B12, folate levels normal, likely related to bone marrow suppression from alcohol    Depression  His PCP is concerned about his depression which is likely caused mostly by his medical conditions. He was recently started on sertraline.   - c/w sertraline once confirmed    LE Edema  Significant edema on admission but on my evaluation after his legs have been elevated there is only slight edema.  Echocardiogram normal in March 2025.  UA on admission without significant proteinuria.  Plan:  -furosemide 20mg IV one time            Diet: NPO for Medical/Clinical Reasons Except for: No Exceptions  Adult Formula Bolus Feeding: Other; Osmolite 1.2; Route: Gastrostomy; 4 times daily; Volume per Bolus: 1; Can(s)/ Carton(s)    DVT Prophylaxis: Enoxaparin (Lovenox) SQ  [place] Catheter: Not present  Lines: None     Cardiac Monitoring: None  Code Status: Full Code      Clinically Significant Risk Factors        # Hypokalemia: Lowest K = 3 mmol/L in last 2 days, will replace as needed      # Hypomagnesemia: Lowest Mg = 1.3 mg/dL in last 2 days, will replace as needed   # Hypoalbuminemia: Lowest albumin = 2.7 g/dL at 7/3/2025  5:29 AM, will monitor as appropriate   # Thrombocytopenia: Lowest platelets = 104 in last 2 days, will monitor for bleeding               # Moderate Malnutrition: based on nutrition assessment and treatment provided per  dietitian's recommendations., PRESENT ON ADMISSION          Social Drivers of Health    Depression: At risk (6/12/2025)    PHQ-2     PHQ-2 Score: 6   Tobacco Use: Medium Risk (6/30/2025)    Patient History     Smoking Tobacco Use: Former     Smokeless Tobacco Use: Never   Alcohol Use: Alcohol Misuse (3/11/2025)    AUDIT-C     Frequency of Alcohol Consumption: 4 or more times a week     Average Number of Drinks: 5 or 6     Frequency of Binge Drinking: Never          Disposition Plan   Medically Ready for Discharge: Needs resolution of ETOH withdrawal, likely 2-3 more days             Ruben English MD  Hospitalist Service  [facility]  Securely message with [employer] (more info)  Text page via [facility] Paging/Directory   ______________________________________________________________________    Interval History   Hide urine CIWA protocol, feels very tired and unwell from withdrawal.  Last drink was thought to be Monday.      Physical Exam   Vital Signs: Temp: 98.1  F (36.7  C) Temp src: Tympanic BP: 113/57 Pulse: 79   Resp: 19 SpO2: 94 % O2 Device: Nasal cannula Oxygen Delivery: 1/2 LPM (removed nasal cannula)  Weight: 187 lbs 6.4 oz  General: Pleasant 70-year-old man lying in bed no acute distress  HEENT: Chronic scarring from radiation and surgery  CV: Regular rate and rhythm, mild pitting edema to just above the ankle bilaterally  Pulmonary: Clear to auscultation, diminished bilateral bases  GI: Soft  Neuro: Tremulous, diaphoretic    Medical Decision Making             Data     I have personally reviewed the following data over the past 24 hrs:    5.0  \   12.6 (L)   / 104 (L)     137 104 9.9 /  98   3.6 25 0.62 (L) \     ALT: 28 AST: 51 (H) AP: 64 TBILI: 0.7   ALB: 2.7 (L) TOT PROTEIN: 5.6 (L) LIPASE: N/A       Imaging results reviewed over the past 24 hrs:   No results found for this or any previous visit (from the past 24 hours).

## 2025-07-03 NOTE — PROGRESS NOTES
:    Received message from Christy at [facility] informing me that they are able to offer patient a bed after his CIWA scores have improved and he is medically cleared.     PATRICK Govea on 7/3/2025 at 9:20 AM    Met with patient and updated him that [facility] is able to offer him a bed on Monday if he is medically cleared. Patient was agreeable with this plan.    Completed Preadmission Screening: [id number]    Left voicemail message with patients sister Maria Del Carmen informing her about the plan for STR after hospitalization and seeing if she is able to transport patient at time of discharge.    PATRICK Govea on 7/3/2025 at 1:17 PM    Spoke with patients brother Dwight and he asked to be contacted on Monday if patient is in need of transportation. He reported he would update his sister and one of them can most likely provide transportation.    PATRICK Govea on 7/3/2025 at 2:04 PM    Received phone call from patients sister Radha and she reported that she will be available to transport patient to [facility] on Monday if he is medically cleared. Radha requested that social workers contact her Monday morning as her sister Maria Del Carmen will be working.    PATRICK Govea on 7/3/2025 at 3:39 PM

## 2025-07-03 NOTE — PLAN OF CARE
Goal Outcome Evaluation:    The patient is alert but not oriented to situation, CIWA scoring has been ranging between 13-15 overnight with increased restlessness, tremors, anxiety and agitation being well managed with PRN valium. Was moved to a room closer to the nurses station due to impulsivity and a high fall risk. The patient is an assist of 1 with a walker and gait belt. Potassium and phosphorus electrolytes replaced with oral packets Q 4 hrs overnight. G-tube has some redness around it, placed a drainage sponge around it.     BP (!) 143/80   Pulse 87   Temp 99.3  F (37.4  C) (Tympanic)   Resp 22   Wt 85 kg (187 lb 6.4 oz)   SpO2 96%   BMI 31.18 kg/m                Plan of Care Reviewed With: patient    Overall Patient Progress: no changeOverall Patient Progress: no change    Outcome Evaluation: intermittent confusion, slightly elevated BP, very restless and agittated, frequently gets up without assistance, pain at a 5/10, CIWA consistantly above 13-15, PRN valium given approximately every 2 hours.        Rika Yi RN on 7/3/2025 at 5:23 AM

## 2025-07-04 ENCOUNTER — APPOINTMENT (OUTPATIENT)
Dept: OCCUPATIONAL THERAPY | Facility: OTHER | Age: 70
End: 2025-07-04
Payer: COMMERCIAL

## 2025-07-04 ENCOUNTER — APPOINTMENT (OUTPATIENT)
Dept: PHYSICAL THERAPY | Facility: OTHER | Age: 70
End: 2025-07-04
Payer: COMMERCIAL

## 2025-07-04 VITALS
RESPIRATION RATE: 16 BRPM | HEART RATE: 79 BPM | SYSTOLIC BLOOD PRESSURE: 128 MMHG | DIASTOLIC BLOOD PRESSURE: 77 MMHG | BODY MASS INDEX: 31.18 KG/M2 | TEMPERATURE: 97.6 F | OXYGEN SATURATION: 92 % | WEIGHT: 187.4 LBS

## 2025-07-04 LAB
ALBUMIN SERPL BCG-MCNC: 3 G/DL (ref 3.5–5.2)
ALP SERPL-CCNC: 70 U/L (ref 40–150)
ALT SERPL W P-5'-P-CCNC: 28 U/L (ref 0–70)
ANION GAP SERPL CALCULATED.3IONS-SCNC: 11 MMOL/L (ref 7–15)
AST SERPL W P-5'-P-CCNC: 39 U/L (ref 0–45)
BILIRUB SERPL-MCNC: 0.9 MG/DL
BUN SERPL-MCNC: 9.6 MG/DL (ref 8–23)
CALCIUM SERPL-MCNC: 7.8 MG/DL (ref 8.8–10.4)
CHLORIDE SERPL-SCNC: 102 MMOL/L (ref 98–107)
CREAT SERPL-MCNC: 0.67 MG/DL (ref 0.67–1.17)
EGFRCR SERPLBLD CKD-EPI 2021: >90 ML/MIN/1.73M2
ERYTHROCYTE [DISTWIDTH] IN BLOOD BY AUTOMATED COUNT: 13.5 % (ref 10–15)
GLUCOSE SERPL-MCNC: 97 MG/DL (ref 70–99)
HCO3 SERPL-SCNC: 24 MMOL/L (ref 22–29)
HCT VFR BLD AUTO: 40.5 % (ref 40–53)
HGB BLD-MCNC: 13.4 G/DL (ref 13.3–17.7)
HOLD SPECIMEN: NORMAL
INR PPP: 1.11 (ref 0.85–1.15)
MAGNESIUM SERPL-MCNC: 1.8 MG/DL (ref 1.7–2.3)
MCH RBC QN AUTO: 36.3 PG (ref 26.5–33)
MCHC RBC AUTO-ENTMCNC: 33.1 G/DL (ref 31.5–36.5)
MCV RBC AUTO: 110 FL (ref 78–100)
PHOSPHATE SERPL-MCNC: 2.4 MG/DL (ref 2.5–4.5)
PLATELET # BLD AUTO: 112 10E3/UL (ref 150–450)
POTASSIUM SERPL-SCNC: 3.4 MMOL/L (ref 3.4–5.3)
PROT SERPL-MCNC: 6.1 G/DL (ref 6.4–8.3)
PROTHROMBIN TIME: 14.6 SECONDS (ref 11.8–14.8)
RBC # BLD AUTO: 3.69 10E6/UL (ref 4.4–5.9)
SODIUM SERPL-SCNC: 137 MMOL/L (ref 135–145)
WBC # BLD AUTO: 5.2 10E3/UL (ref 4–11)

## 2025-07-04 PROCEDURE — 97530 THERAPEUTIC ACTIVITIES: CPT | Mod: GP,PN | Performed by: PHYSICAL THERAPIST

## 2025-07-04 PROCEDURE — 250N000011 HC RX IP 250 OP 636: Performed by: INTERNAL MEDICINE

## 2025-07-04 PROCEDURE — 36415 COLL VENOUS BLD VENIPUNCTURE: CPT | Performed by: STUDENT IN AN ORGANIZED HEALTH CARE EDUCATION/TRAINING PROGRAM

## 2025-07-04 PROCEDURE — 250N000013 HC RX MED GY IP 250 OP 250 PS 637: Performed by: STUDENT IN AN ORGANIZED HEALTH CARE EDUCATION/TRAINING PROGRAM

## 2025-07-04 PROCEDURE — 97530 THERAPEUTIC ACTIVITIES: CPT | Mod: GO

## 2025-07-04 PROCEDURE — 85610 PROTHROMBIN TIME: CPT | Performed by: STUDENT IN AN ORGANIZED HEALTH CARE EDUCATION/TRAINING PROGRAM

## 2025-07-04 PROCEDURE — 250N000013 HC RX MED GY IP 250 OP 250 PS 637: Performed by: INTERNAL MEDICINE

## 2025-07-04 PROCEDURE — 84100 ASSAY OF PHOSPHORUS: CPT | Performed by: STUDENT IN AN ORGANIZED HEALTH CARE EDUCATION/TRAINING PROGRAM

## 2025-07-04 PROCEDURE — 84155 ASSAY OF PROTEIN SERUM: CPT | Performed by: STUDENT IN AN ORGANIZED HEALTH CARE EDUCATION/TRAINING PROGRAM

## 2025-07-04 PROCEDURE — 120N000001 HC R&B MED SURG/OB

## 2025-07-04 PROCEDURE — 85014 HEMATOCRIT: CPT | Performed by: STUDENT IN AN ORGANIZED HEALTH CARE EDUCATION/TRAINING PROGRAM

## 2025-07-04 PROCEDURE — 83735 ASSAY OF MAGNESIUM: CPT | Performed by: STUDENT IN AN ORGANIZED HEALTH CARE EDUCATION/TRAINING PROGRAM

## 2025-07-04 RX ORDER — CEFPODOXIME PROXETIL 100 MG/1
200 TABLET, FILM COATED ORAL 2 TIMES DAILY
Status: COMPLETED | OUTPATIENT
Start: 2025-07-04 | End: 2025-07-06

## 2025-07-04 RX ORDER — TRAZODONE HYDROCHLORIDE 50 MG/1
50 TABLET ORAL AT BEDTIME
Status: DISCONTINUED | OUTPATIENT
Start: 2025-07-04 | End: 2025-07-07 | Stop reason: HOSPADM

## 2025-07-04 RX ADMIN — FOLIC ACID 1 MG: 5 INJECTION, SOLUTION INTRAMUSCULAR; INTRAVENOUS; SUBCUTANEOUS at 09:01

## 2025-07-04 RX ADMIN — FAMOTIDINE 20 MG: 20 TABLET, FILM COATED ORAL at 08:45

## 2025-07-04 RX ADMIN — TRAZODONE HYDROCHLORIDE 25 MG: 50 TABLET ORAL at 21:50

## 2025-07-04 RX ADMIN — CLONIDINE HYDROCHLORIDE 0.1 MG: 0.1 TABLET ORAL at 08:45

## 2025-07-04 RX ADMIN — MULTIVIT AND MINERALS-FERROUS GLUCONATE 9 MG IRON/15 ML ORAL LIQUID 15 ML: at 08:46

## 2025-07-04 RX ADMIN — THIAMINE HYDROCHLORIDE 100 MG: 100 INJECTION, SOLUTION INTRAMUSCULAR; INTRAVENOUS at 09:11

## 2025-07-04 RX ADMIN — POTASSIUM & SODIUM PHOSPHATES POWDER PACK 280-160-250 MG 1 PACKET: 280-160-250 PACK at 16:38

## 2025-07-04 RX ADMIN — OXYCODONE HYDROCHLORIDE 10 MG: 5 TABLET ORAL at 05:40

## 2025-07-04 RX ADMIN — POTASSIUM & SODIUM PHOSPHATES POWDER PACK 280-160-250 MG 1 PACKET: 280-160-250 PACK at 12:18

## 2025-07-04 RX ADMIN — FAMOTIDINE 20 MG: 20 TABLET, FILM COATED ORAL at 21:50

## 2025-07-04 RX ADMIN — POTASSIUM & SODIUM PHOSPHATES POWDER PACK 280-160-250 MG 1 PACKET: 280-160-250 PACK at 08:45

## 2025-07-04 RX ADMIN — CEFPODOXIME PROXETIL 200 MG: 100 TABLET, FILM COATED ORAL at 21:51

## 2025-07-04 RX ADMIN — OXYCODONE HYDROCHLORIDE 10 MG: 5 TABLET ORAL at 21:51

## 2025-07-04 RX ADMIN — SERTRALINE HYDROCHLORIDE 25 MG: 25 TABLET ORAL at 08:45

## 2025-07-04 RX ADMIN — FENTANYL 1 PATCH: 25 PATCH TRANSDERMAL at 10:35

## 2025-07-04 ASSESSMENT — ACTIVITIES OF DAILY LIVING (ADL)
ADLS_ACUITY_SCORE: 65
ADLS_ACUITY_SCORE: 67
ADLS_ACUITY_SCORE: 65

## 2025-07-04 NOTE — PROGRESS NOTES
[facility]    Medicine Progress Note - Hospitalist Service    Date of Admission:  6/30/2025    Assessment & Plan      Prasanna Alonso is a 70 year old man with a past medical history of neck cancer status post resection and G-tube placement, alcohol use disorder and chronic pain who was admitted on 6/30/2025 due to acute hypoxic respiratory failure from a right sided presumed aspiration pneumonia.    Patient presented emergency department the day of admission due to shortness of breath and abdominal distention and overall generalized weakness.  H he reports having fallen at home and was unable to get up.  Workup in the emergency department was remarkable for sats to 82% on room air and imaging remarkable for scarring/atelectasis of the left and right lower lobes and hepatic steatosis.  He was subsequently admitted to the hospital for presumed aspiration pneumonia and hypoxic respiratory failure. He unfortunately developed alcohol withdrawal.    Right sided aspiration pneumonia  He presented after a fall with weakness but he actually denies any respiratory symptoms. Imaging, however, shows right sided infiltrates that could be pneumonia or pneumonitis. Given that he has a G-gube, the possibility of aspiration is raised though could be chronic scarring in lungs.  Will restart tube feeds and reassess his breathing.  Anticipate TCU on discharge.  - c/w ceftriaxone and azithromycin  - follow-up blood and sputum cultures  - Weaned off of supplemental oxygen  -  continue tube feeds    G-tube dependent  History of head and neck cancer s/p radical surgery, chemotherapy and radiation   Hypomagnesemia  Hypokalemia   Hypophosphatemia  - Restart tube feeds 4 times daily  -magnesium, potassium, phosphorus replacement protocol      EtOH abuse  Alcohol Withdrawal  He admits that he uses his J-tube to ingest alcohol every day. He ingests about 3-4 drinks daily. Last drink likely 6/30. His intake is probably  related to his long-standing depression. Withdrawal started 7/2/25 and no longer requiring diazepam per CIWA scoring.  Minimal CIWA score likely related to tremor.  Nearing the end of withdrawal.  - CIWA protocol initiated with IV diazepam  - c/w IV folic acid and thiamine  - Restart sertraline    Falls, Generalized weakness  Assessment: Mechanical fall, reports no injuries, but cannot get up at home.  Will add on a CK to ensure he does not develop rhabdomyolysis  Plan:   -add on CK normal      Macrocytic anemia  Thrombocytopenia  Assessment: Previous B12 and folate levels were normal though sometime ago.  Suspect related to his alcoholism.  WBC normal this hospitalization.  Likely due to alcohol use  Plan:   - Vitamin B12, folate levels normal, likely related to bone marrow suppression from alcohol    Depression  His PCP is concerned about his depression which is likely caused mostly by his medical conditions. He was recently started on sertraline.   - c/w sertraline once confirmed    LE Edema  Significant edema on admission but on my evaluation after his legs have been elevated there is only slight edema.  Echocardiogram normal in March 2025.  UA on admission without significant proteinuria.  Plan:  - Monitor            Diet: NPO for Medical/Clinical Reasons Except for: No Exceptions  Adult Formula Bolus Feeding: Other; Osmolite 1.2; Route: Gastrostomy; 4 times daily; Volume per Bolus: 1; Can(s)/ Carton(s)    DVT Prophylaxis: Enoxaparin (Lovenox) SQ  [place] Catheter: Not present  Lines: None     Cardiac Monitoring: None  Code Status: Full Code      Clinically Significant Risk Factors             # Hypomagnesemia: Lowest Mg = 1.3 mg/dL in last 2 days, will replace as needed   # Hypoalbuminemia: Lowest albumin = 2.7 g/dL at 7/3/2025  5:29 AM, will monitor as appropriate   # Thrombocytopenia: Lowest platelets = 104 in last 2 days, will monitor for bleeding               # Moderate Malnutrition: based on nutrition  assessment and treatment provided per dietitian's recommendations., PRESENT ON ADMISSION          Social Drivers of Health    Depression: At risk (6/12/2025)    PHQ-2     PHQ-2 Score: 6   Tobacco Use: Medium Risk (6/30/2025)    Patient History     Smoking Tobacco Use: Former     Smokeless Tobacco Use: Never   Alcohol Use: Alcohol Misuse (3/11/2025)    AUDIT-C     Frequency of Alcohol Consumption: 4 or more times a week     Average Number of Drinks: 5 or 6     Frequency of Binge Drinking: Never          Disposition Plan   Medically Ready for Discharge: Medically ready for discharge today, awaiting TCU placement            Ruben English MD  Hospitalist Service  [facility]  Securely message with [employer] (more info)  Text page via [facility] Paging/Directory   ______________________________________________________________________    Interval History     Patient looks much better today.  He states he feels better though did not sleep that well last night.  CIWA scores are minimal, getting points for a likely chronic tremor but not receiving any benzodiazepines.    Sitting up in the chair pleasant appropriate has no concerns other than his sleep.  Hopeful to get out of here soon he states.      Physical Exam   Vital Signs: Temp: 97.1  F (36.2  C) Temp src: Tympanic BP: 132/79 Pulse: 74   Resp: 16 SpO2: 94 % O2 Device: None (Room air) Oxygen Delivery: 1/2 LPM (removed nasal cannula)  Weight: 187 lbs 9.6 oz  General: Pleasant 70-year-old man sitting in chair no acute distress  HEENT: Chronic scarring from radiation and surgery  CV: Regular rate and rhythm, minimal ankle edema  Pulmonary: Clear to auscultation, diminished bilateral bases  GI: Soft  Neuro: Resting tremor in hands but alert pleasant, oriented x 3    Medical Decision Making             Data     I have personally reviewed the following data over the past 24 hrs:    5.2  \   13.4   / 112 (L)     137 102 9.6 /  97   3.4 24 0.67 \     ALT: 28 AST:  39 AP: 70 TBILI: 0.9   ALB: 3.0 (L) TOT PROTEIN: 6.1 (L) LIPASE: N/A     INR:  1.11 PTT:  N/A   D-dimer:  N/A Fibrinogen:  N/A       Imaging results reviewed over the past 24 hrs:   No results found for this or any previous visit (from the past 24 hours).

## 2025-07-04 NOTE — PROGRESS NOTES
[facility]    Medicine Progress Note - Hospitalist Service    Date of Admission:  6/30/2025    Assessment & Plan      Prasanna Alonso is a 70 year old man with a past medical history of neck cancer status post resection and G-tube placement, alcohol use disorder and chronic pain who was admitted on 6/30/2025 due to acute hypoxic respiratory failure from a right sided presumed aspiration pneumonia.    Patient presented emergency department the day of admission due to shortness of breath and abdominal distention and overall generalized weakness.  H he reports having fallen at home and was unable to get up.  Workup in the emergency department was remarkable for sats to 82% on room air and imaging remarkable for scarring/atelectasis of the left and right lower lobes and hepatic steatosis.  He was subsequently admitted to the hospital for presumed aspiration pneumonia and hypoxic respiratory failure. He unfortunately developed alcohol withdrawal.    Right sided aspiration pneumonia  He presented after a fall with weakness but he actually denies any respiratory symptoms. Imaging, however, shows right sided infiltrates that could be pneumonia or pneumonitis. Given that he has a G-gube, the possibility of aspiration is raised though could be chronic scarring in lungs.  Will restart tube feeds and reassess his breathing.  Anticipate TCU on discharge.  - azithromycin complete  -convert ceftriaxone to cefpodoxime through 7/6.  - follow-up blood cultures  - Weaned off of supplemental oxygen  - continue tube feeds    G-tube dependent  History of head and neck cancer s/p radical surgery, chemotherapy and radiation   Hypomagnesemia  Hypokalemia   Hypophosphatemia  - Restart tube feeds 4 times daily  -magnesium, potassium, phosphorus replacement protocol      EtOH abuse  Alcohol Withdrawal  He admits that he uses his J-tube to ingest alcohol every day. He ingests about 3-4 drinks daily. Last drink likely  6/30. His intake is probably related to his long-standing depression. Withdrawal started 7/2/25 and no longer requiring diazepam per CIWA scoring.  Minimal CIWA score likely related to tremor.  Nearing the end of withdrawal.  - CIWA protocol initiated with IV diazepam  - c/w IV folic acid and thiamine  - Restart sertraline    Falls, Generalized weakness  Assessment: Mechanical fall, reports no injuries, but cannot get up at home.  Will add on a CK to ensure he does not develop rhabdomyolysis  Plan:   -add on CK normal      Macrocytic anemia  Thrombocytopenia  Assessment: Previous B12 and folate levels were normal though sometime ago.  Suspect related to his alcoholism.  WBC normal this hospitalization.  Likely due to alcohol use  Plan:   - Vitamin B12, folate levels normal, likely related to bone marrow suppression from alcohol    Depression  His PCP is concerned about his depression which is likely caused mostly by his medical conditions. He was recently started on sertraline.   - c/w sertraline once confirmed    LE Edema  Significant edema on admission but on my evaluation after his legs have been elevated there is only slight edema.  Echocardiogram normal in March 2025.  UA on admission without significant proteinuria.  Plan:  - Monitor            Diet: NPO for Medical/Clinical Reasons Except for: No Exceptions  Adult Formula Bolus Feeding: Other; Osmolite 1.2; Route: Gastrostomy; 4 times daily; Volume per Bolus: 1; Can(s)/ Carton(s)    DVT Prophylaxis: Enoxaparin (Lovenox) SQ  [place] Catheter: Not present  Lines: None     Cardiac Monitoring: None  Code Status: Full Code      Clinically Significant Risk Factors             # Hypomagnesemia: Lowest Mg = 1.3 mg/dL in last 2 days, will replace as needed   # Hypoalbuminemia: Lowest albumin = 2.7 g/dL at 7/3/2025  5:29 AM, will monitor as appropriate   # Thrombocytopenia: Lowest platelets = 104 in last 2 days, will monitor for bleeding               # Moderate  Malnutrition: based on nutrition assessment and treatment provided per dietitian's recommendations., PRESENT ON ADMISSION          Social Drivers of Health    Depression: At risk (6/12/2025)    PHQ-2     PHQ-2 Score: 6   Tobacco Use: Medium Risk (6/30/2025)    Patient History     Smoking Tobacco Use: Former     Smokeless Tobacco Use: Never   Alcohol Use: Alcohol Misuse (3/11/2025)    AUDIT-C     Frequency of Alcohol Consumption: 4 or more times a week     Average Number of Drinks: 5 or 6     Frequency of Binge Drinking: Never          Disposition Plan   Medically Ready for Discharge: Medically ready for discharge today, awaiting TCU placement            Ruben English MD  Hospitalist Service  [facility]  Securely message with [employer] (more info)  Text page via [facility] Paging/Directory   ______________________________________________________________________    Interval History     Patient looks much better today.  He states he feels better though did not sleep that well last night.  CIWA scores are minimal, getting points for a likely chronic tremor but not receiving any benzodiazepines.    Sitting up in the chair pleasant appropriate has no concerns other than his sleep.  Hopeful to get out of here soon he states.      Physical Exam   Vital Signs: Temp: 97.1  F (36.2  C) Temp src: Tympanic BP: 132/79 Pulse: 74   Resp: 16 SpO2: 94 % O2 Device: None (Room air)    Weight: 187 lbs 9.6 oz  General: Pleasant 70-year-old man sitting in chair no acute distress  HEENT: Chronic scarring from radiation and surgery  CV: Regular rate and rhythm, minimal ankle edema  Pulmonary: Clear to auscultation, diminished bilateral bases  GI: Soft  Neuro: Resting tremor in hands but alert pleasant, oriented x 3    Medical Decision Making             Data     I have personally reviewed the following data over the past 24 hrs:    5.2  \   13.4   / 112 (L)     137 102 9.6 /  97   3.4 24 0.67 \     ALT: 28 AST: 39 AP: 70  TBILI: 0.9   ALB: 3.0 (L) TOT PROTEIN: 6.1 (L) LIPASE: N/A     INR:  1.11 PTT:  N/A   D-dimer:  N/A Fibrinogen:  N/A       Imaging results reviewed over the past 24 hrs:   No results found for this or any previous visit (from the past 24 hours).

## 2025-07-04 NOTE — PLAN OF CARE
A/O, ciwas scoring 2/10, due to tremors,  pts sister is visiting and reports that this is very close to his baseline as he does have some tremors normally.  Hx oral cancer, NPO, has G-Tube placement. Tube feeding by gravity 4 times a day. Crush meds, use G-tube.  G-tube site has some redness/irritation around site.   SBA x 1 with walker/gait belt. LS coarse.   Possible discharge to [facility] on Monday.     /79 (BP Location: Left arm, Patient Position: Chair, Cuff Size: Adult Regular)   Pulse 74   Temp 97.1  F (36.2  C) (Tympanic)   Resp 16   Wt 85.1 kg (187 lb 9.6 oz)   SpO2 94%   BMI 31.22 kg/m       Goal Outcome Evaluation:           Plan of Care Reviewed With: patient    Overall Patient Progress: improvingOverall Patient Progress: improving    Outcome Evaluation: ciwa scoreing 2 due to tremors, but pts sister visiting and states that this is close to his baseline. left side neck pain 5/10, walker and gait belt

## 2025-07-04 NOTE — PLAN OF CARE
Goal Outcome Evaluation:      Plan of Care Reviewed With: patient    Overall Patient Progress: improvingOverall Patient Progress: improving    Outcome Evaluation: Ciwa scoring between 2-6, no confusion, increased paroxomal sweats, pain at a 5/10, SBA with a walker/gaitbelt      The patient has shown no confusion this shift, some visible sweating, had to change linens for it. He continues to have mild tremors. Crackles in lungs, hyperactive bowel sounds. Continues to receive QID formula tube feedings. Declines any PRN pain medication for consistent pain. Has been having multiple loose brown stools. He continues to have redness and irritation of the skin around the G-tube with a dressing over it.   BP (!) 147/85 (BP Location: Left arm, Patient Position: Semi-Hernandez's, Cuff Size: Adult Regular)   Pulse 67   Temp 98.6  F (37  C) (Tympanic)   Resp 18   Wt 85.1 kg (187 lb 9.6 oz)   SpO2 94%   BMI 31.22 kg/m

## 2025-07-04 NOTE — PROGRESS NOTES
07/04/25 1053   Appointment Info   Signing Clinician's Name / Credentials (OT) Viviana Veras OTR/L   Therapeutic Activities   Therapeutic Activity Minutes (88711) 30   Symptoms noted during/after treatment fatigue   Treatment Detail/Skilled Intervention Reports feeling weak and confused this date and reports baseline R jaw/head pain. Agreeable to ambulation with FWW only this date. Declines cares/shower cap.   OT Discharge Planning   OT Plan progress ADL's and activity tolerance   OT Discharge Recommendation (DC Rec) Transitional Care Facility   OT Rationale for DC Rec Pt would greatly benefit from STR to address safety and independence needed to return to apt as previous   OT Brief overview of current status Patient moving/transferring well today. Fatigue with activity   Total Session Time   Timed Code Treatment Minutes 30   Total Session Time (sum of timed and untimed services) 30

## 2025-07-04 NOTE — PROGRESS NOTES
07/04/25 1000   Appointment Info   Signing Clinician's Name / Credentials (PT) Ammon Muñoz, PT   Therapeutic Activity   Therapeutic Activities: dynamic activities to improve functional performance Minutes (58574) 30   Symptoms Noted During/After Treatment Fatigue   Treatment Detail/Skilled Intervention Ongoing disorientation. Pt very cooperative.  Ambulation 150 ft with FWW and CGA x 1.  Fatigue after walking and states it was his limit.  Was not interested in doing a shower cap or other hygiene today.   PT Discharge Planning   PT Plan continue   PT Discharge Recommendation (DC Rec) Transitional Care Facility   PT Rationale for DC Rec Pt requires assist for all self cares and mobility.   PT Brief overview of current status CGA with FWW for walking.  Cues for self-cares.   Physical Therapy Time and Intention   Timed Code Treatment Minutes 30   Total Session Time (sum of timed and untimed services) 30

## 2025-07-05 ENCOUNTER — APPOINTMENT (OUTPATIENT)
Dept: OCCUPATIONAL THERAPY | Facility: OTHER | Age: 70
End: 2025-07-05
Payer: COMMERCIAL

## 2025-07-05 ENCOUNTER — APPOINTMENT (OUTPATIENT)
Dept: PHYSICAL THERAPY | Facility: OTHER | Age: 70
End: 2025-07-05
Payer: COMMERCIAL

## 2025-07-05 LAB
ANION GAP SERPL CALCULATED.3IONS-SCNC: 10 MMOL/L (ref 7–15)
BUN SERPL-MCNC: 10.7 MG/DL (ref 8–23)
CALCIUM SERPL-MCNC: 8.5 MG/DL (ref 8.8–10.4)
CHLORIDE SERPL-SCNC: 104 MMOL/L (ref 98–107)
CREAT SERPL-MCNC: 0.71 MG/DL (ref 0.67–1.17)
EGFRCR SERPLBLD CKD-EPI 2021: >90 ML/MIN/1.73M2
ERYTHROCYTE [DISTWIDTH] IN BLOOD BY AUTOMATED COUNT: 13.6 % (ref 10–15)
GLUCOSE SERPL-MCNC: 94 MG/DL (ref 70–99)
HCO3 SERPL-SCNC: 24 MMOL/L (ref 22–29)
HCT VFR BLD AUTO: 41.7 % (ref 40–53)
HGB BLD-MCNC: 13.9 G/DL (ref 13.3–17.7)
HOLD SPECIMEN: NORMAL
HOLD SPECIMEN: NORMAL
MAGNESIUM SERPL-MCNC: 1.6 MG/DL (ref 1.7–2.3)
MCH RBC QN AUTO: 36.6 PG (ref 26.5–33)
MCHC RBC AUTO-ENTMCNC: 33.3 G/DL (ref 31.5–36.5)
MCV RBC AUTO: 110 FL (ref 78–100)
PHOSPHATE SERPL-MCNC: 2.8 MG/DL (ref 2.5–4.5)
PLATELET # BLD AUTO: 137 10E3/UL (ref 150–450)
POTASSIUM SERPL-SCNC: 3.5 MMOL/L (ref 3.4–5.3)
RBC # BLD AUTO: 3.8 10E6/UL (ref 4.4–5.9)
SODIUM SERPL-SCNC: 138 MMOL/L (ref 135–145)
WBC # BLD AUTO: 6.4 10E3/UL (ref 4–11)

## 2025-07-05 PROCEDURE — 250N000013 HC RX MED GY IP 250 OP 250 PS 637: Performed by: STUDENT IN AN ORGANIZED HEALTH CARE EDUCATION/TRAINING PROGRAM

## 2025-07-05 PROCEDURE — 83735 ASSAY OF MAGNESIUM: CPT | Performed by: STUDENT IN AN ORGANIZED HEALTH CARE EDUCATION/TRAINING PROGRAM

## 2025-07-05 PROCEDURE — 97535 SELF CARE MNGMENT TRAINING: CPT | Mod: GO

## 2025-07-05 PROCEDURE — 120N000001 HC R&B MED SURG/OB

## 2025-07-05 PROCEDURE — 80048 BASIC METABOLIC PNL TOTAL CA: CPT | Performed by: STUDENT IN AN ORGANIZED HEALTH CARE EDUCATION/TRAINING PROGRAM

## 2025-07-05 PROCEDURE — 85018 HEMOGLOBIN: CPT | Performed by: STUDENT IN AN ORGANIZED HEALTH CARE EDUCATION/TRAINING PROGRAM

## 2025-07-05 PROCEDURE — 84100 ASSAY OF PHOSPHORUS: CPT | Performed by: STUDENT IN AN ORGANIZED HEALTH CARE EDUCATION/TRAINING PROGRAM

## 2025-07-05 PROCEDURE — 36415 COLL VENOUS BLD VENIPUNCTURE: CPT | Performed by: STUDENT IN AN ORGANIZED HEALTH CARE EDUCATION/TRAINING PROGRAM

## 2025-07-05 PROCEDURE — 97530 THERAPEUTIC ACTIVITIES: CPT | Mod: GP,PN | Performed by: PHYSICAL THERAPIST

## 2025-07-05 RX ADMIN — SERTRALINE HYDROCHLORIDE 25 MG: 25 TABLET ORAL at 08:53

## 2025-07-05 RX ADMIN — CEFPODOXIME PROXETIL 200 MG: 100 TABLET, FILM COATED ORAL at 21:47

## 2025-07-05 RX ADMIN — TRAZODONE HYDROCHLORIDE 25 MG: 50 TABLET ORAL at 21:47

## 2025-07-05 RX ADMIN — OXYCODONE HYDROCHLORIDE 10 MG: 5 TABLET ORAL at 09:25

## 2025-07-05 RX ADMIN — IBUPROFEN 400 MG: 100 SUSPENSION ORAL at 09:23

## 2025-07-05 RX ADMIN — IBUPROFEN 400 MG: 100 SUSPENSION ORAL at 17:28

## 2025-07-05 RX ADMIN — FAMOTIDINE 20 MG: 20 TABLET, FILM COATED ORAL at 21:47

## 2025-07-05 RX ADMIN — FAMOTIDINE 20 MG: 20 TABLET, FILM COATED ORAL at 08:53

## 2025-07-05 RX ADMIN — MULTIVIT AND MINERALS-FERROUS GLUCONATE 9 MG IRON/15 ML ORAL LIQUID 15 ML: at 08:54

## 2025-07-05 RX ADMIN — CEFPODOXIME PROXETIL 200 MG: 100 TABLET, FILM COATED ORAL at 08:52

## 2025-07-05 RX ADMIN — OXYCODONE HYDROCHLORIDE 10 MG: 5 TABLET ORAL at 21:47

## 2025-07-05 RX ADMIN — OXYCODONE HYDROCHLORIDE 10 MG: 5 TABLET ORAL at 05:35

## 2025-07-05 RX ADMIN — OXYCODONE HYDROCHLORIDE 10 MG: 5 TABLET ORAL at 17:28

## 2025-07-05 ASSESSMENT — ACTIVITIES OF DAILY LIVING (ADL)
ADLS_ACUITY_SCORE: 61
ADLS_ACUITY_SCORE: 58
ADLS_ACUITY_SCORE: 61
ADLS_ACUITY_SCORE: 58
ADLS_ACUITY_SCORE: 61
ADLS_ACUITY_SCORE: 58
ADLS_ACUITY_SCORE: 61
ADLS_ACUITY_SCORE: 61
ADLS_ACUITY_SCORE: 58
ADLS_ACUITY_SCORE: 61
ADLS_ACUITY_SCORE: 58
ADLS_ACUITY_SCORE: 61
ADLS_ACUITY_SCORE: 58

## 2025-07-05 NOTE — PLAN OF CARE
A/O x 4, Ciwa scores 0.  Chronic pain 5/10, fetanyl patch on, PRN oxy.  Pt has a chronic tremor at baseline.  G-tube in place, NPO. Uses home formula to gravity.  Meds via G-tube.   SBA x 1 with walker, has hx of oral cancer can be difficult to understand.  On room air.  VSS.  Hx of seizures in past.  Mild edema to BLE's.      BP (!) 146/85 (BP Location: Right arm, Patient Position: Semi-Hernandez's, Cuff Size: Adult Regular)   Pulse 71   Temp 97.4  F (36.3  C) (Tympanic)   Resp 18   Wt 84.7 kg (186 lb 12.8 oz)   SpO2 93%   BMI 31.09 kg/m       Goal Outcome Evaluation:      Plan of Care Reviewed With: patient    Overall Patient Progress: improvingOverall Patient Progress: improving    Outcome Evaluation: ciwa score 0.  chronic L neck pain.  VSS.

## 2025-07-05 NOTE — PROGRESS NOTES
07/05/25 0552   Appointment Info   Signing Clinician's Name / Credentials (OT) Viviana Veras OTR/L   Self-Care/Home Management   Self-Care/Home Mgmt/ADL, Compensatory, Meal Prep Minutes (72000) 45   Symptoms Noted During/After Treatment (Meal Preparation/Planning Training) fatigue   Treatment Detail/Skilled Intervention Patient declines shower. Does not clearly state why. Says he showers at home, but prefers a sponge bath. CGA for upper body bathing and dressing. Min A for lower body dressing with cues and assistance for safety, especially when reaching for feet. Cues for safe use of walker during standing portion of task.   OT Discharge Planning   OT Plan progress ADL's and activity tolerance   OT Discharge Recommendation (DC Rec) Transitional Care Facility   OT Rationale for DC Rec Pt would greatly benefit from STR to address safety and independence needed to return to apt as previous   OT Brief overview of current status Patient tolerating activity well this date. Declines shower, but complete thorough sponge bath this date with up to min A . Continues to benefit from safety training and intervention with use of walker and during ADLs.   Total Session Time   Timed Code Treatment Minutes 45   Total Session Time (sum of timed and untimed services) 45        [facility] EMERGENCY DEPT  [id number] [id number] [phone]    Work/School Note    Date: 12/22/2021    To Whom It May concern:    Jeb Lainez was seen and treated today in the emergency room by the following provider(s):  Physician Assistant: Vinay Pittman. Jeb Lainez is excused from work/school on 12/22/21 and 12/23/21. She is medically clear to return to work/school on 12/24/2021.        Sincerely,          Milton Riley

## 2025-07-05 NOTE — PLAN OF CARE
Goal Outcome Evaluation:      Plan of Care Reviewed With: patient    Overall Patient Progress: improvingOverall Patient Progress: improving     Pt is alert and orientated X4. VSS> Pain rating 5/10. PRN pain medications given with relief. CIWA scores have remained at 2. No interventions. G-tube in place. Tube feeding completed last evening. SBA with walker. Course LS in bases.

## 2025-07-05 NOTE — PROGRESS NOTES
07/05/25 1300   Appointment Info   Signing Clinician's Name / Credentials (PT) Ammon Muñoz, PT   Therapeutic Activity   Therapeutic Activities: dynamic activities to improve functional performance Minutes (22009) 15   Treatment Detail/Skilled Intervention Sit-stand with FWW and CGA x 1, verbal cues for safety.  Ambulation with FWW and CGA x 450 ft.  Cues needed for safety with transfers using FWW.  OT assisted with shower cap/cares - not interested in showering today.   PT Discharge Planning   PT Plan continue   PT Discharge Recommendation (DC Rec) Transitional Care Facility;home with assist   PT Rationale for DC Rec Continues to need assist for safety with self-cares and mobility.  Improved stability and endurance for transfers and walking today.   PT Brief overview of current status CGA and FWW for walking   PT Equipment Needed at Discharge walker, rolling   Physical Therapy Time and Intention   Timed Code Treatment Minutes 15   Total Session Time (sum of timed and untimed services) 15

## 2025-07-05 NOTE — PROGRESS NOTES
[facility]    Medicine Progress Note - Hospitalist Service    Date of Admission:  6/30/2025    Assessment & Plan      Prasanna Alonso is a 70 year old man with a past medical history of neck cancer status post resection and G-tube placement, alcohol use disorder and chronic pain who was admitted on 6/30/2025 due to acute hypoxic respiratory failure from a right sided presumed aspiration pneumonia.    Patient presented emergency department the day of admission due to shortness of breath and abdominal distention and overall generalized weakness.  H he reports having fallen at home and was unable to get up.  Workup in the emergency department was remarkable for sats to 82% on room air and imaging remarkable for scarring/atelectasis of the left and right lower lobes and hepatic steatosis.  He was subsequently admitted to the hospital for presumed aspiration pneumonia and hypoxic respiratory failure. He unfortunately developed alcohol withdrawal, but no longer requiring any intervention.    Right sided aspiration pneumonia  He presented after a fall with weakness but he actually denies any respiratory symptoms. Imaging, however, shows right sided infiltrates that could be pneumonia or pneumonitis. Given that he has a G-gube, the possibility of aspiration is raised though could be chronic scarring in lungs.  Will restart tube feeds and reassess his breathing.  Anticipate TCU on discharge.  - azithromycin complete  -convert ceftriaxone to cefpodoxime through 7/6.  - follow-up blood cultures  - Weaned off of supplemental oxygen  - continue tube feeds    G-tube dependent  History of head and neck cancer s/p radical surgery, chemotherapy and radiation   Hypomagnesemia  Hypokalemia   Hypophosphatemia  - Restart tube feeds 4 times daily  -magnesium, potassium, phosphorus replacement protocols      EtOH abuse  Alcohol Withdrawal  He admits that he uses his J-tube to ingest alcohol every day. He ingests  about 3-4 drinks daily. Last drink likely 6/30. His intake is probably related to his long-standing depression. Withdrawal started 7/2/25 and no longer requiring diazepam per CIWA scoring.  Minimal CIWA score likely related to tremor.  Nearing the end of withdrawal.  - CIWA protocol discontinued  - c/w IV folic acid and thiamine  - Restart sertraline    Falls, Generalized weakness  Assessment: Mechanical fall, reports no injuries, but cannot get up at home.  Will add on a CK to ensure he does not develop rhabdomyolysis  Plan:   -PT/OT      Macrocytic anemia  Thrombocytopenia  Assessment: Previous B12 and folate levels were normal though sometime ago.  Suspect related to his alcoholism.  WBC normal this hospitalization.  Likely due to alcohol use  Plan:   - Vitamin B12, folate levels normal, likely related to bone marrow suppression from alcohol    Depression  His PCP is concerned about his depression which is likely caused mostly by his medical conditions. He was recently started on sertraline.   - started home sertraline    LE Edema  Significant edema on admission but on my evaluation after his legs have been elevated there is only slight edema.  Echocardiogram normal in March 2025.  UA on admission without significant proteinuria.  Plan:  - Monitor            Diet: NPO for Medical/Clinical Reasons Except for: No Exceptions  Adult Formula Bolus Feeding: Other; Osmolite 1.2; Route: Gastrostomy; 4 times daily; Volume per Bolus: 1; Can(s)/ Carton(s)    DVT Prophylaxis: Enoxaparin (Lovenox) SQ  [place] Catheter: Not present  Lines: None     Cardiac Monitoring: None  Code Status: Full Code      Clinically Significant Risk Factors             # Hypomagnesemia: Lowest Mg = 1.6 mg/dL in last 2 days, will replace as needed   # Hypoalbuminemia: Lowest albumin = 2.7 g/dL at 7/3/2025  5:29 AM, will monitor as appropriate   # Thrombocytopenia: Lowest platelets = 112 in last 2 days, will monitor for bleeding               #  Moderate Malnutrition: based on nutrition assessment and treatment provided per dietitian's recommendations.           Social Drivers of Health    Depression: At risk (6/12/2025)    PHQ-2     PHQ-2 Score: 6   Tobacco Use: Medium Risk (6/30/2025)    Patient History     Smoking Tobacco Use: Former     Smokeless Tobacco Use: Never   Alcohol Use: Alcohol Misuse (3/11/2025)    AUDIT-C     Frequency of Alcohol Consumption: 4 or more times a week     Average Number of Drinks: 5 or 6     Frequency of Binge Drinking: Never          Disposition Plan   Medically Ready for Discharge: Medically ready for discharge today, awaiting TCU placement            Ruben English MD  Hospitalist Service  [facility]  Securely message with [employer] (more info)  Text page via [facility] Paging/Directory   ______________________________________________________________________    Interval History     Up in the chair today, feeling much better, no longer having any scoring for CIWA other than his chronic tremor so CIWA scoring discontinued, has not received any benzodiazepines for greater than 24 hours.  He is feeling well.  He is ambulating well with physical therapy.  He has no particular concerns today.      Physical Exam   Vital Signs: Temp: 97  F (36.1  C) Temp src: Tympanic BP: 125/67 Pulse: 73   Resp: 20 SpO2: 93 % O2 Device: None (Room air)    Weight: 186 lbs 12.8 oz  General: Pleasant 70-year-old man sitting in chair no acute distress  HEENT: Chronic scarring from radiation and surgery  CV: Regular rate and rhythm, minimal ankle edema  Pulmonary: Clear to auscultation, diminished in bilateral bases  GI: Soft  Neuro: Resting tremor in hands but alert pleasant, oriented x 3    Medical Decision Making             Data     I have personally reviewed the following data over the past 24 hrs:    6.4  \   13.9   / 137 (L)     138 104 10.7 /  94   3.5 24 0.71 \       Imaging results reviewed over the past 24 hrs:   No results found  for this or any previous visit (from the past 24 hours).

## 2025-07-06 ENCOUNTER — APPOINTMENT (OUTPATIENT)
Dept: PHYSICAL THERAPY | Facility: OTHER | Age: 70
End: 2025-07-06
Payer: COMMERCIAL

## 2025-07-06 ENCOUNTER — APPOINTMENT (OUTPATIENT)
Dept: OCCUPATIONAL THERAPY | Facility: OTHER | Age: 70
End: 2025-07-06
Payer: COMMERCIAL

## 2025-07-06 LAB
ANION GAP SERPL CALCULATED.3IONS-SCNC: 10 MMOL/L (ref 7–15)
BACTERIA SPEC CULT: NO GROWTH
BACTERIA SPEC CULT: NO GROWTH
BUN SERPL-MCNC: 11.9 MG/DL (ref 8–23)
CALCIUM SERPL-MCNC: 8.9 MG/DL (ref 8.8–10.4)
CHLORIDE SERPL-SCNC: 103 MMOL/L (ref 98–107)
CREAT SERPL-MCNC: 0.77 MG/DL (ref 0.67–1.17)
EGFRCR SERPLBLD CKD-EPI 2021: >90 ML/MIN/1.73M2
ERYTHROCYTE [DISTWIDTH] IN BLOOD BY AUTOMATED COUNT: 13.6 % (ref 10–15)
GLUCOSE SERPL-MCNC: 92 MG/DL (ref 70–99)
HCO3 SERPL-SCNC: 24 MMOL/L (ref 22–29)
HCT VFR BLD AUTO: 41.1 % (ref 40–53)
HGB BLD-MCNC: 13.6 G/DL (ref 13.3–17.7)
HOLD SPECIMEN: NORMAL
HOLD SPECIMEN: NORMAL
MAGNESIUM SERPL-MCNC: 1.6 MG/DL (ref 1.7–2.3)
MCH RBC QN AUTO: 36.7 PG (ref 26.5–33)
MCHC RBC AUTO-ENTMCNC: 33.1 G/DL (ref 31.5–36.5)
MCV RBC AUTO: 111 FL (ref 78–100)
PHOSPHATE SERPL-MCNC: 3.4 MG/DL (ref 2.5–4.5)
PLATELET # BLD AUTO: 143 10E3/UL (ref 150–450)
POTASSIUM SERPL-SCNC: 4 MMOL/L (ref 3.4–5.3)
RBC # BLD AUTO: 3.71 10E6/UL (ref 4.4–5.9)
SODIUM SERPL-SCNC: 137 MMOL/L (ref 135–145)
WBC # BLD AUTO: 7.5 10E3/UL (ref 4–11)

## 2025-07-06 PROCEDURE — 85027 COMPLETE CBC AUTOMATED: CPT | Performed by: STUDENT IN AN ORGANIZED HEALTH CARE EDUCATION/TRAINING PROGRAM

## 2025-07-06 PROCEDURE — 250N000013 HC RX MED GY IP 250 OP 250 PS 637: Performed by: STUDENT IN AN ORGANIZED HEALTH CARE EDUCATION/TRAINING PROGRAM

## 2025-07-06 PROCEDURE — 97535 SELF CARE MNGMENT TRAINING: CPT | Mod: GO

## 2025-07-06 PROCEDURE — 97530 THERAPEUTIC ACTIVITIES: CPT | Mod: GP,PN | Performed by: PHYSICAL THERAPIST

## 2025-07-06 PROCEDURE — 80048 BASIC METABOLIC PNL TOTAL CA: CPT | Performed by: STUDENT IN AN ORGANIZED HEALTH CARE EDUCATION/TRAINING PROGRAM

## 2025-07-06 PROCEDURE — 36415 COLL VENOUS BLD VENIPUNCTURE: CPT | Performed by: STUDENT IN AN ORGANIZED HEALTH CARE EDUCATION/TRAINING PROGRAM

## 2025-07-06 PROCEDURE — 84100 ASSAY OF PHOSPHORUS: CPT | Performed by: STUDENT IN AN ORGANIZED HEALTH CARE EDUCATION/TRAINING PROGRAM

## 2025-07-06 PROCEDURE — 120N000001 HC R&B MED SURG/OB

## 2025-07-06 PROCEDURE — 83735 ASSAY OF MAGNESIUM: CPT | Performed by: STUDENT IN AN ORGANIZED HEALTH CARE EDUCATION/TRAINING PROGRAM

## 2025-07-06 RX ORDER — MAGNESIUM OXIDE 400 MG/1
400 TABLET ORAL ONCE
Status: COMPLETED | OUTPATIENT
Start: 2025-07-06 | End: 2025-07-06

## 2025-07-06 RX ADMIN — OXYCODONE HYDROCHLORIDE 10 MG: 5 TABLET ORAL at 16:06

## 2025-07-06 RX ADMIN — CHLORHEXIDINE GLUCONATE 15 ML: 1.2 RINSE ORAL at 08:55

## 2025-07-06 RX ADMIN — IBUPROFEN 400 MG: 100 SUSPENSION ORAL at 16:06

## 2025-07-06 RX ADMIN — IBUPROFEN 400 MG: 100 SUSPENSION ORAL at 08:55

## 2025-07-06 RX ADMIN — TRAZODONE HYDROCHLORIDE 25 MG: 50 TABLET ORAL at 21:17

## 2025-07-06 RX ADMIN — FAMOTIDINE 20 MG: 20 TABLET, FILM COATED ORAL at 21:17

## 2025-07-06 RX ADMIN — OXYCODONE HYDROCHLORIDE 10 MG: 5 TABLET ORAL at 21:17

## 2025-07-06 RX ADMIN — OXYCODONE HYDROCHLORIDE 10 MG: 5 TABLET ORAL at 08:56

## 2025-07-06 RX ADMIN — MULTIVIT AND MINERALS-FERROUS GLUCONATE 9 MG IRON/15 ML ORAL LIQUID 15 ML: at 08:57

## 2025-07-06 RX ADMIN — CEFPODOXIME PROXETIL 200 MG: 100 TABLET, FILM COATED ORAL at 08:56

## 2025-07-06 RX ADMIN — SERTRALINE HYDROCHLORIDE 25 MG: 25 TABLET ORAL at 08:56

## 2025-07-06 RX ADMIN — CEFPODOXIME PROXETIL 200 MG: 100 TABLET, FILM COATED ORAL at 21:18

## 2025-07-06 RX ADMIN — FAMOTIDINE 20 MG: 20 TABLET, FILM COATED ORAL at 08:56

## 2025-07-06 RX ADMIN — Medication 400 MG: at 12:15

## 2025-07-06 ASSESSMENT — ACTIVITIES OF DAILY LIVING (ADL)
ADLS_ACUITY_SCORE: 59
ADLS_ACUITY_SCORE: 59
ADLS_ACUITY_SCORE: 61
ADLS_ACUITY_SCORE: 61
ADLS_ACUITY_SCORE: 59
ADLS_ACUITY_SCORE: 61
ADLS_ACUITY_SCORE: 59
ADLS_ACUITY_SCORE: 59
ADLS_ACUITY_SCORE: 61
ADLS_ACUITY_SCORE: 59
ADLS_ACUITY_SCORE: 59
ADLS_ACUITY_SCORE: 61
ADLS_ACUITY_SCORE: 61
ADLS_ACUITY_SCORE: 59
ADLS_ACUITY_SCORE: 61
ADLS_ACUITY_SCORE: 59
ADLS_ACUITY_SCORE: 59
ADLS_ACUITY_SCORE: 61
ADLS_ACUITY_SCORE: 61
ADLS_ACUITY_SCORE: 59

## 2025-07-06 NOTE — PLAN OF CARE
A/O x 4, VSS, BP elevated.  Afebrile.  LS clear bilaterally.  Pain chroni 5/10.      Goal Outcome Evaluation:      Plan of Care Reviewed With: patient    Overall Patient Progress: improvingOverall Patient Progress: improving

## 2025-07-06 NOTE — PLAN OF CARE
Goal Outcome Evaluation:      Plan of Care Reviewed With: patient    Overall Patient Progress: improvingOverall Patient Progress: improving     Pt is alert and orientated X4. Vitals stable. Hypertensive. Afebrile. LS coarse in bases. Pain rating 5/10, chronic. G-tube in place. Garbled speech. SBA with walker and gait belt. Continent of bladder.

## 2025-07-06 NOTE — PROGRESS NOTES
07/06/25 1132   Appointment Info   Signing Clinician's Name / Credentials (OT) Viviana Veras OTR/L   Self-Care/Home Management   Symptoms Noted During/After Treatment (Meal Preparation/Planning Training) fatigue   Treatment Detail/Skilled Intervention Patient declines shower. Completes sponge bath standing at sink for upper body care then seated rest break before completing lower body care. Patient demonstrate 4 sit to stands in bathroom without use of devices. Tremor noted in UB with fatigue.   OT Discharge Planning   OT Plan progress ADL's and activity tolerance   OT Discharge Recommendation (DC Rec) Transitional Care Facility   OT Rationale for DC Rec Pt would greatly benefit from STR to address safety and independence needed to return to apt as previous   OT Brief overview of current status Continues to demonstrate improvement in activity tolerance. Ambulated in hallway without AD and tolerated standing bathroom tasks before fatigue. Continue use of FWW .

## 2025-07-06 NOTE — PROGRESS NOTES
07/06/25 1200   Appointment Info   Signing Clinician's Name / Credentials (PT) Ammon Muñoz, PT   Rehab Comments (PT) Planned DC to [facility] Monday 7/7/25.   Therapeutic Activity   Therapeutic Activities: dynamic activities to improve functional performance Minutes (72484) 30   Symptoms Noted During/After Treatment Fatigue   Treatment Detail/Skilled Intervention Ambulation with CGA x 1 x 200ft.  Increased fatigue without use of FWW today.  After seated rest, completed cares standing at bathroom sink (declined shower).  Hand tremor observed with activity.  Ambulation 15 ft to bedside chair with FWW and CGA.  Mild fatigue reported.   PT Discharge Planning   PT Plan continue   PT Discharge Recommendation (DC Rec) Transitional Care Facility   PT Rationale for DC Rec impaired endurance, improving cognition and stability with walking   PT Brief overview of current status FWW for ambulation due to fatigue with CGA x 1

## 2025-07-06 NOTE — PROGRESS NOTES
[facility]    Medicine Progress Note - Hospitalist Service    Date of Admission:  6/30/2025    Assessment & Plan      Prasanna Alonso is a 70 year old man with a past medical history of neck cancer status post resection and G-tube placement, alcohol use disorder and chronic pain who was admitted on 6/30/2025 due to acute hypoxic respiratory failure from a right sided presumed aspiration pneumonia.    Patient presented emergency department the day of admission due to shortness of breath and abdominal distention and overall generalized weakness.  H he reports having fallen at home and was unable to get up.  Workup in the emergency department was remarkable for sats to 82% on room air and imaging remarkable for scarring/atelectasis of the left and right lower lobes and hepatic steatosis.  He was subsequently admitted to the hospital for presumed aspiration pneumonia and hypoxic respiratory failure. He unfortunately developed alcohol withdrawal, but no longer requiring any intervention.    Right sided aspiration pneumonia  He presented after a fall with weakness but he actually denies any respiratory symptoms. Imaging, however, shows right sided infiltrates that could be pneumonia or pneumonitis. Given that he has a G-gube, the possibility of aspiration is raised though could be chronic scarring in lungs.  Will restart tube feeds and reassess his breathing.  Accepted to [facility] tomorrow for rehab  - azithromycin complete  -cefpodoxime complete this evening.  - follow-up blood cultures  - Weaned off of supplemental oxygen  - continue tube feeds    G-tube dependent  History of head and neck cancer s/p radical surgery, chemotherapy and radiation   Hypomagnesemia  Hypokalemia   Hypophosphatemia  - Restart tube feeds 4 times daily  -magnesium, potassium, phosphorus replacement protocols      EtOH abuse  Alcohol Withdrawal  He admits that he uses his J-tube to ingest alcohol every day. He ingests  about 3-4 drinks daily. Last drink likely 6/30. His intake is probably related to his long-standing depression. Withdrawal started 7/2/25 and no longer requiring diazepam per CIWA scoring.  Minimal CIWA score likely related to tremor.  Nearing the end of withdrawal.  - CIWA protocol discontinued  - Stopped thiamine and folate  -multivitamin  - Restart sertraline    Falls, Generalized weakness  Assessment: Mechanical fall, reports no injuries, but cannot get up at home.  Will add on a CK to ensure he does not develop rhabdomyolysis  Plan:   -PT/OT      Macrocytic anemia  Thrombocytopenia  Assessment: Previous B12 and folate levels were normal though sometime ago.  Suspect related to his alcoholism.  WBC normal this hospitalization.  Likely due to alcohol use  Plan:   - Vitamin B12, folate levels normal, likely related to bone marrow suppression from alcohol    Depression  His PCP is concerned about his depression which is likely caused mostly by his medical conditions. He was recently started on sertraline.   - started home sertraline    LE Edema  Significant edema on admission but on my evaluation after his legs have been elevated there is only slight edema.  Echocardiogram normal in March 2025.  UA on admission without significant proteinuria.  Plan:  - Monitor            Diet: NPO for Medical/Clinical Reasons Except for: No Exceptions  Adult Formula Bolus Feeding: Other; Osmolite 1.2; Route: Gastrostomy; 4 times daily; Volume per Bolus: 1; Can(s)/ Carton(s)    DVT Prophylaxis: Enoxaparin (Lovenox) SQ  [place] Catheter: Not present  Lines: None     Cardiac Monitoring: None  Code Status: Full Code      Clinically Significant Risk Factors             # Hypomagnesemia: Lowest Mg = 1.6 mg/dL in last 2 days, will replace as needed   # Hypoalbuminemia: Lowest albumin = 2.7 g/dL at 7/3/2025  5:29 AM, will monitor as appropriate                 # Moderate Malnutrition: based on nutrition assessment and treatment provided per  dietitian's recommendations.           Social Drivers of Health    Depression: At risk (6/12/2025)    PHQ-2     PHQ-2 Score: 6   Tobacco Use: Medium Risk (6/30/2025)    Patient History     Smoking Tobacco Use: Former     Smokeless Tobacco Use: Never   Alcohol Use: Alcohol Misuse (3/11/2025)    AUDIT-C     Frequency of Alcohol Consumption: 4 or more times a week     Average Number of Drinks: 5 or 6     Frequency of Binge Drinking: Never          Disposition Plan   Medically Ready for Discharge: Medically ready for discharge today, awaiting TCU placement            Ruben English MD  Hospitalist Service  [facility]  Securely message with [employer] (more info)  Text page via [facility] Paging/Directory   ______________________________________________________________________    Interval History     No fevers or chills  Ambulating better   Strength improving.       Physical Exam   Vital Signs: Temp: 97.8  F (36.6  C) Temp src: Tympanic BP: (!) 158/81 Pulse: 70   Resp: 18 SpO2: 95 % O2 Device: None (Room air)    Weight: 186 lbs 12.8 oz  General: Pleasant 70-year-old man sitting in chair no acute distress  HEENT: Chronic scarring from radiation and surgery  CV: Regular rate and rhythm, minimal ankle edema  Pulmonary: Clear to auscultation, diminished in bilateral bases  GI: Soft  Neuro: Resting tremor in hands but alert pleasant, oriented x 3    Medical Decision Making             Data     I have personally reviewed the following data over the past 24 hrs:    7.5  \   13.6   / 143 (L)     137 103 11.9 /  92   4.0 24 0.77 \       Imaging results reviewed over the past 24 hrs:   No results found for this or any previous visit (from the past 24 hours).

## 2025-07-07 VITALS
SYSTOLIC BLOOD PRESSURE: 161 MMHG | DIASTOLIC BLOOD PRESSURE: 84 MMHG | RESPIRATION RATE: 18 BRPM | WEIGHT: 184.4 LBS | HEART RATE: 78 BPM | BODY MASS INDEX: 30.69 KG/M2 | OXYGEN SATURATION: 93 % | TEMPERATURE: 97.9 F

## 2025-07-07 PROBLEM — R10.9 ABDOMINAL DISCOMFORT: Status: RESOLVED | Noted: 2025-06-30 | Resolved: 2025-07-07

## 2025-07-07 PROBLEM — J96.01 ACUTE RESPIRATORY FAILURE WITH HYPOXIA (H): Status: ACTIVE | Noted: 2025-07-07

## 2025-07-07 PROBLEM — G47.00 PERSISTENT INSOMNIA: Status: ACTIVE | Noted: 2025-07-07
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
## 2025-07-07 PROBLEM — E83.42 HYPOMAGNESEMIA: Status: ACTIVE | Noted: 2025-07-07

## 2025-07-07 LAB
ANION GAP SERPL CALCULATED.3IONS-SCNC: 9 MMOL/L (ref 7–15)
BUN SERPL-MCNC: 11.9 MG/DL (ref 8–23)
CALCIUM SERPL-MCNC: 9.1 MG/DL (ref 8.8–10.4)
CHLORIDE SERPL-SCNC: 107 MMOL/L (ref 98–107)
CREAT SERPL-MCNC: 0.78 MG/DL (ref 0.67–1.17)
EGFRCR SERPLBLD CKD-EPI 2021: >90 ML/MIN/1.73M2
ERYTHROCYTE [DISTWIDTH] IN BLOOD BY AUTOMATED COUNT: 13.3 % (ref 10–15)
GLUCOSE SERPL-MCNC: 94 MG/DL (ref 70–99)
HCO3 SERPL-SCNC: 23 MMOL/L (ref 22–29)
HCT VFR BLD AUTO: 40.8 % (ref 40–53)
HGB BLD-MCNC: 13.6 G/DL (ref 13.3–17.7)
MAGNESIUM SERPL-MCNC: 1.5 MG/DL (ref 1.7–2.3)
MCH RBC QN AUTO: 36.6 PG (ref 26.5–33)
MCHC RBC AUTO-ENTMCNC: 33.3 G/DL (ref 31.5–36.5)
MCV RBC AUTO: 110 FL (ref 78–100)
PHOSPHATE SERPL-MCNC: 3 MG/DL (ref 2.5–4.5)
PLATELET # BLD AUTO: 164 10E3/UL (ref 150–450)
POTASSIUM SERPL-SCNC: 4.1 MMOL/L (ref 3.4–5.3)
RBC # BLD AUTO: 3.72 10E6/UL (ref 4.4–5.9)
SODIUM SERPL-SCNC: 139 MMOL/L (ref 135–145)
WBC # BLD AUTO: 7 10E3/UL (ref 4–11)

## 2025-07-07 PROCEDURE — 250N000013 HC RX MED GY IP 250 OP 250 PS 637: Performed by: STUDENT IN AN ORGANIZED HEALTH CARE EDUCATION/TRAINING PROGRAM

## 2025-07-07 PROCEDURE — 83735 ASSAY OF MAGNESIUM: CPT | Performed by: STUDENT IN AN ORGANIZED HEALTH CARE EDUCATION/TRAINING PROGRAM

## 2025-07-07 PROCEDURE — 36415 COLL VENOUS BLD VENIPUNCTURE: CPT | Performed by: STUDENT IN AN ORGANIZED HEALTH CARE EDUCATION/TRAINING PROGRAM

## 2025-07-07 PROCEDURE — 85027 COMPLETE CBC AUTOMATED: CPT | Performed by: STUDENT IN AN ORGANIZED HEALTH CARE EDUCATION/TRAINING PROGRAM

## 2025-07-07 PROCEDURE — 84100 ASSAY OF PHOSPHORUS: CPT | Performed by: INTERNAL MEDICINE

## 2025-07-07 PROCEDURE — 80048 BASIC METABOLIC PNL TOTAL CA: CPT | Performed by: STUDENT IN AN ORGANIZED HEALTH CARE EDUCATION/TRAINING PROGRAM

## 2025-07-07 RX ORDER — MULTIPLE VITAMINS W/ MINERALS TAB 9MG-400MCG
1 TAB ORAL DAILY
Qty: 100 TABLET | Refills: 0 | Status: SHIPPED | OUTPATIENT
Start: 2025-07-07

## 2025-07-07 RX ORDER — FENTANYL 25 UG/1
1 PATCH TRANSDERMAL
Qty: 10 PATCH | Refills: 0 | Status: SHIPPED | OUTPATIENT
Start: 2025-07-07

## 2025-07-07 RX ORDER — MAGNESIUM OXIDE 400 MG/1
400 TABLET ORAL 2 TIMES DAILY
Qty: 60 TABLET | Refills: 0 | Status: SHIPPED | OUTPATIENT
Start: 2025-07-07

## 2025-07-07 RX ORDER — MAGNESIUM SULFATE HEPTAHYDRATE 40 MG/ML
4 INJECTION, SOLUTION INTRAVENOUS ONCE
Status: COMPLETED | OUTPATIENT
Start: 2025-07-07 | End: 2025-07-07

## 2025-07-07 RX ORDER — FAMOTIDINE 20 MG/1
20 TABLET, FILM COATED ORAL 2 TIMES DAILY
Qty: 60 TABLET | Refills: 0 | Status: SHIPPED | OUTPATIENT
Start: 2025-07-07

## 2025-07-07 RX ORDER — OXYCODONE HYDROCHLORIDE 10 MG/1
10 TABLET ORAL EVERY 4 HOURS PRN
Qty: 45 TABLET | Refills: 0 | Status: SHIPPED | OUTPATIENT
Start: 2025-07-07

## 2025-07-07 RX ORDER — IBUPROFEN 100 MG/5ML
20 SUSPENSION ORAL EVERY 6 HOURS PRN
Qty: 273 ML | Refills: 2 | Status: SHIPPED | OUTPATIENT
Start: 2025-07-07

## 2025-07-07 RX ORDER — TRAZODONE HYDROCHLORIDE 50 MG/1
50 TABLET ORAL AT BEDTIME
Qty: 60 TABLET | Refills: 0 | Status: SHIPPED | OUTPATIENT
Start: 2025-07-07

## 2025-07-07 RX ORDER — MAGNESIUM SULFATE HEPTAHYDRATE 40 MG/ML
4 INJECTION, SOLUTION INTRAVENOUS ONCE
Status: CANCELLED | OUTPATIENT
Start: 2025-07-07 | End: 2025-07-07

## 2025-07-07 RX ADMIN — IBUPROFEN 400 MG: 100 SUSPENSION ORAL at 10:24

## 2025-07-07 RX ADMIN — OXYCODONE HYDROCHLORIDE 10 MG: 5 TABLET ORAL at 03:15

## 2025-07-07 RX ADMIN — FAMOTIDINE 20 MG: 20 TABLET, FILM COATED ORAL at 10:24

## 2025-07-07 RX ADMIN — MULTIVIT AND MINERALS-FERROUS GLUCONATE 9 MG IRON/15 ML ORAL LIQUID 15 ML: at 10:39

## 2025-07-07 RX ADMIN — FENTANYL 1 PATCH: 25 PATCH TRANSDERMAL at 10:27

## 2025-07-07 RX ADMIN — SERTRALINE HYDROCHLORIDE 25 MG: 25 TABLET ORAL at 10:24

## 2025-07-07 ASSESSMENT — ACTIVITIES OF DAILY LIVING (ADL)
ADLS_ACUITY_SCORE: 59
ADLS_ACUITY_SCORE: 61
ADLS_ACUITY_SCORE: 59

## 2025-07-07 NOTE — DISCHARGE SUMMARY
[facility]    Discharge Summary  Hospitalist    Date of Admission:  6/30/2025  Date of Discharge:  7/7/2025  Discharging Provider: Ruben English MD  Date of Service (when I saw the patient): 07/07/25    Discharge Diagnoses   Principal Problem:    Acute respiratory failure with hypoxia (H)    Date Noted: 7/7/2025  Active Problems:    Alcohol abuse, daily use    Date Noted: 6/13/2011    Alcoholic peripheral neuropathy    Date Noted: 10/20/2015    Chronic, continuous use of opioids    Date Noted: 10/24/2022    Malignant neoplasm of the lip, oral cavity, and pharynx (H)    Date Noted: 5/19/2022    Osteoradionecrosis of mandible    Date Noted: 6/16/2022    Chronic pain syndrome    Date Noted: 3/11/2025    Non morbid obesity due to excess calories    Date Noted: 3/11/2025    Gastrostomy tube dependent (H)    Date Noted: 3/11/2025    History of radiation to head and neck region    Date Noted: 3/11/2025    History of radical neck surgery    Date Noted: 3/11/2025    Right middle lobe pneumonia    Date Noted: 6/30/2025    Hypomagnesemia    Date Noted: 7/7/2025    Persistent insomnia    Date Noted: 7/7/2025    Fall    Date Noted: 7/7/2025      History of Present Illness   Prasanna Alonso is a 70 year old man with a past medical history of neck cancer status post resection, radiation, and G-tube placement, alcohol use disorder and chronic pain who was admitted on 6/30/2025 due to acute hypoxic respiratory failure from a right sided presumed aspiration pneumonia/ pneumonitis.     The patient presented to the emergency department the day of admission due to shortness of breath and abdominal distention and overall generalized weakness.  He reports having fallen at home and was unable to get up.  Workup in the emergency department was remarkable for sats to 82% on room air and imaging remarkable for scarring/atelectasis of the left and right lower lobes and hepatic steatosis.  He was subsequently admitted  to the hospital for presumed aspiration pneumonia/pneumonitis and hypoxic respiratory failure. He completed a 5 day course of oral and IV antibiotics prior to discharge. He was weaned off of supplemental oxygen prior to discharge. He unfortunately developed alcohol withdrawal, but no longer requiring any intervention.    Hospital Course   Prasanna Alonso was admitted on 6/30/2025.  The following problems were addressed during his hospitalization:    Right sided aspiration pneumonia  He presented after a fall with weakness but he actually denies any respiratory symptoms. Imaging, however, shows right sided infiltrates that could be pneumonia or pneumonitis. Given that he has a G-gube, the possibility of aspiration is raised though could be chronic scarring in lungs.  Will restart tube feeds and reassess his breathing. No longer requiring oxygen. Completed course of ceftriaxone/ cefpodoxime and azithromycin prior to discharge. Accepted to [facility] for TCU stay.  - azithromycin complete  -cefpodoxime complete  - follow-up blood cultures  - Weaned off of supplemental oxygen  - continue tube feeds     G-tube dependent  History of head and neck cancer s/p radical surgery, chemotherapy and radiation              Hypomagnesemia  Hypokalemia   Hypophosphatemia  - Restart tube feeds 4 times daily  -magnesium, potassium, phosphorus replacement protocols  -start magnesium oxide 400mg BID on discharge, recheck in 2-4 days        EtOH abuse  Alcohol Withdrawal  He admits that he uses his J-tube to ingest alcohol every day. He ingests about 3-4 drinks daily. Last drink likely 6/30. His intake is probably related to his long-standing depression. Withdrawal started 7/2/25 and no longer requiring diazepam per CIWA scoring.  Minimal CIWA score likely related to tremor.  Has been off CIWA protocol for 3+ days now.   - CIWA protocol discontinued  - Stopped thiamine and folate  -multivitamin  - Restart sertraline     Falls,  Generalized weakness  Assessment: Mechanical fall, reports no injuries, but cannot get up at home.  Negative CK at home.  Plan:   -PT/OT        Macrocytic anemia  Thrombocytopenia  Assessment: Previous B12 and folate levels were normal though sometime ago.  Suspect related to his alcoholism.  WBC normal this hospitalization.  Likely due to alcohol use  Plan:   - Vitamin B12, folate levels normal, likely related to bone marrow suppression from alcohol  -Cbc normalizing on discharge, chronic macrocytosis.     Depression  Insomnia  His PCP is concerned about his depression which is likely caused mostly by his medical conditions. He was recently started on sertraline.   - started home sertraline  -trazodone 50mg at bedtime      LE Edema  Significant edema on admission but on my evaluation after his legs have been elevated there is minimal edema.  Echocardiogram normal in March 2025.  UA on admission without significant proteinuria.  Plan:  - Monitor    Ruben English MD    Significant Results and Procedures       Pending Results   These results will be followed up by NA  Unresulted Labs Ordered in the Past 30 Days of this Admission       No orders found from 5/31/2025 to 7/1/2025.            Code Status   Full Code       Primary Care Physician   Ariel Vincent    Physical Exam   Temp: 97.8  F (36.6  C) Temp src: Tympanic BP: (!) 161/83 Pulse: 72   Resp: 18 SpO2: 93 % O2 Device: None (Room air)    Vitals:    07/04/25 0136 07/05/25 0052 07/07/25 0403   Weight: 85.1 kg (187 lb 9.6 oz) 84.7 kg (186 lb 12.8 oz) 83.6 kg (184 lb 6.4 oz)     Vital Signs with Ranges  Temp:  [97  F (36.1  C)-97.8  F (36.6  C)] 97.8  F (36.6  C)  Pulse:  [72-76] 72  Resp:  [16-18] 18  BP: (136-161)/(76-83) 161/83  SpO2:  [93 %-95 %] 93 %  I/O last 3 completed shifts:  In: 1288 [NG/GT:577]  Out: -     General: Pleasant 70-year-old man sitting in chair no acute distress  HEENT: Chronic scarring from radiation and surgery  CV: Regular rate and  rhythm, minimal ankle edema  Pulmonary: Clear to auscultation, diminished in bilateral bases  GI: Soft  Neuro: Resting tremor in hands but alert pleasant, oriented x 3    Discharge Disposition   [facility]  Condition at discharge: Stable    Consultations This Hospital Stay   PHARMACY IP CONSULT  MEDICATION HISTORY IP PHARMACY CONSULT  PHYSICAL THERAPY ADULT IP CONSULT  OCCUPATIONAL THERAPY ADULT IP CONSULT  SOCIAL WORK IP CONSULT  NUTRITION SERVICES ADULT IP CONSULT  PHYSICAL THERAPY ADULT IP CONSULT  OCCUPATIONAL THERAPY ADULT IP CONSULT  SOCIAL WORK IP CONSULT    Time Spent on this Encounter   IRuben MD, personally saw the patient today and spent greater than 30 minutes discharging this patient.    Discharge Orders      General info for SNF    Length of Stay Estimate: Short Term Care: Estimated # of Days <30  Condition at Discharge: Improving  Level of care:skilled   Rehabilitation Potential: Excellent  Admission H&P remains valid and up-to-date: Yes  Recent Chemotherapy: N/A  Use Nursing Home Standing Orders: Yes     Mantoux instructions    Give two-step Mantoux (PPD) Per Facility Policy Yes     Follow Up and recommended labs and tests    Follow up with retirement physician.  The following labs/tests are recommended: Recommend BMP and magnesium in 2 to 5 days.  Adjust his magnesium oxide as indicated.  Trend his CBC.     Tubes and Drains    Current Tubes and Drains:     Drain  Duration           GI Enteral Access Device LLQ -- days              Reason for your hospital stay    You are in the hospital for a fall.  You were found to have an oxygen requirement which we think was secondary to pneumonia.  You completed your course of antibiotics while in the hospital.  Is important for you to stop drinking as it is evident that your bone marrow is being suppressed from the alcohol use.     Activity - Up with nursing assistance     Full Code     Physical Therapy  Adult Consult    Evaluate and treat as clinically indicated.    Reason: Falls, impaired gait mobility     Occupational Therapy Adult Consult    Evaluate and treat as clinically indicated.    Reason: Impaired ADLs     Adult Formula Tube Feeding    Follow this regimen upon discharge: Current Diet:Orders Placed This Encounter      Adult Formula Bolus Feeding: Other; Osmolite 1.2; Route: Gastrostomy; 4 times daily; Volume per Bolus: 1; Can(s)/ Carton(s)      NPO for Medical/Clinical Reasons Except for: No Exceptions     Diet    Follow this diet upon discharge: Current Diet:Orders Placed This Encounter      Adult Formula Bolus Feeding: Other; Osmolite 1.2; Route: Gastrostomy; 4 times daily; Volume per Bolus: 1; Can(s)/ Carton(s)      Adult Formula Tube Feeding      NPO for Medical/Clinical Reasons Except for: No Exceptions     Discharge Medications   Current Discharge Medication List        START taking these medications    Details   magnesium oxide (MAG-OX) 400 MG tablet Place 1 tablet (400 mg) into Feeding Tube 2 times daily.  Qty: 60 tablet, Refills: 0    Associated Diagnoses: Hypomagnesemia      traZODone (DESYREL) 50 MG tablet Place 1 tablet (50 mg) into Feeding Tube at bedtime.  Qty: 60 tablet, Refills: 0    Associated Diagnoses: Persistent insomnia           CONTINUE these medications which have CHANGED    Details   famotidine (PEPCID) 20 MG tablet Place 1 tablet (20 mg) into Feeding Tube 2 times daily.  Qty: 60 tablet, Refills: 0    Associated Diagnoses: History of gastrointestinal bleeding      fentaNYL (DURAGESIC) 25 mcg/hr 72 hr patch Place 1 patch onto the skin every 72 hours.  Qty: 10 patch, Refills: 0    Associated Diagnoses: Chronic pain syndrome; Chronic, continuous use of opioids; Malignant neoplasm of the lip, oral cavity, and pharynx (H)      ibuprofen (ADVIL/MOTRIN) 100 MG/5ML suspension Take 20 mLs (400 mg) by mouth or Feeding Tube every 6 hours as needed for inflammatory pain.  Qty: 273 mL, Refills:  2    Associated Diagnoses: Chronic pain syndrome      multivitamin w/minerals (THERA-VIT-M) tablet Take 1 tablet by mouth or Feeding Tube daily.  Qty: 100 tablet, Refills: 0    Associated Diagnoses: Alcohol abuse      oxyCODONE IR (ROXICODONE) 10 MG tablet Take 1 tablet (10 mg) by mouth or Feeding Tube every 4 hours as needed for pain.  Qty: 45 tablet, Refills: 0    Associated Diagnoses: Chronic pain syndrome; Chronic, continuous use of opioids; Malignant neoplasm of the lip, oral cavity, and pharynx (H)           CONTINUE these medications which have NOT CHANGED    Details   chlorhexidine (PERIDEX) 0.12 % solution Take 15 mLs by mouth 2 times daily. Swish and spit      sertraline (ZOLOFT) 20 MG/ML (HIGH CONC) solution Place 1.25 mLs (25 mg) into G tube daily.  Qty: 60 mL, Refills: 1    Associated Diagnoses: Mood disorder           Allergies   No Known Allergies  Data   Most Recent 3 CBC's:  Recent Labs   Lab Test 07/07/25  0627 07/06/25  0551 07/05/25  0549   WBC 7.0 7.5 6.4   HGB 13.6 13.6 13.9   * 111* 110*    143* 137*      Most Recent 3 BMP's:  Recent Labs   Lab Test 07/07/25  0627 07/06/25  0551 07/05/25  0549    137 138   POTASSIUM 4.1 4.0 3.5   CHLORIDE 107 103 104   CO2 23 24 24   BUN 11.9 11.9 10.7   CR 0.78 0.77 0.71   ANIONGAP 9 10 10   LANI 9.1 8.9 8.5*   GLC 94 92 94     Most Recent 2 LFT's:  Recent Labs   Lab Test 07/04/25  0549 07/03/25  0529   AST 39 51*   ALT 28 28   ALKPHOS 70 64   BILITOTAL 0.9 0.7     Most Recent INR's and Anticoagulation Dosing History:  Anticoagulation Dose History          Latest Ref Rng & Units 9/15/2015 7/21/2016 4/27/2017 7/26/2018 7/4/2019 7/4/2025   Recent Dosing and Labs   INR 0.85 - 1.15 1.1  1.0  1.1  0.98  1.02  1.11      Most Recent 3 Troponin's:No lab results found.  Most Recent Cholesterol Panel:  Recent Labs   Lab Test 03/11/25  1428   CHOL 196   LDL 94   HDL 80   TRIG 109     Most Recent 6 Bacteria Isolates From Any Culture (See EPIC Reports  for Culture Details):No lab results found.  Most Recent TSH, T4 and A1c Labs:  Recent Labs   Lab Test 06/30/25  1717 03/11/25  1428   TSH 9.70* 5.20*   T4 0.99 1.18   A1C  --  5.3     Results for orders placed or performed during the hospital encounter of 06/30/25   XR Chest Port 1 View    Narrative    EXAM: XR CHEST PORT 1 VIEW  LOCATION: [facility]  DATE: 6/30/2025    INDICATION: Shortness of breath  COMPARISON: CT chest 06/23/2020.      Impression    IMPRESSION: Cardiac silhouette is within normal limits. Aortic arch calcification. Linear/patchy opacities of the medial right lung base may represent atelectasis or developing infection depending on the clinical context. Asymmetric elevation the right   hemidiaphragm. No pleural effusion or discernible pneumothorax.   CT Chest/Abdomen/Pelvis w Contrast    Narrative    EXAM: CT CHEST/ABDOMEN/PELVIS W CONTRAST  LOCATION: [facility]  DATE: 6/30/2025    INDICATION: Cough, congestion, abnormal chest x ray, hypoxemia, please rule out occult pneumonia.  Also patient with worsening abdominal distention and tenderness, patient has indwelling PEG tube, rule out obstruction please  COMPARISON: Same date portable chest radiograph, CT abdomen/pelvis with contrast 07/20/2020, CT chest without contrast 06/23/2020  TECHNIQUE: CT scan of the chest, abdomen, and pelvis was performed following injection of IV contrast. Multiplanar reformats were obtained. Dose reduction techniques were used.   CONTRAST: 107mL Isovue 370    FINDINGS:     LUNGS AND PLEURA: Corresponding with the findings on same day chest radiograph is linear subsegmental atelectasis or scarring of the right lower lobe. There is also mild subsegmental atelectasis or scarring of the left lower lobe. No acute focal   pulmonary consolidation otherwise, or pleural effusion. Scattered small solid pulmonary nodules are essentially unchanged and favored to represent benign findings,  as example a 5 mm peripheral nodule of the right lower lobe (series 4 image 182).    MEDIASTINUM/AXILLAE: No lymphadenopathy or pericardial effusion. Atherosclerotic calcifications of the aortic arch and descending thoracic aorta.    CORONARY ARTERY CALCIFICATION: None.    HEPATOBILIARY: Diffuse hepatic steatosis. Benign hemangioma of the right hepatic lobe. Unremarkable gallbladder.    PANCREAS: Normal.    SPLEEN: Normal.    ADRENAL GLANDS: Normal.    KIDNEYS/BLADDER: Small left renal cysts which require no dedicated follow-up. No hydronephrosis. Unremarkable urinary bladder.    BOWEL: Colonic diverticulosis. Normal appendix. Percutaneous gastrostomy tube. No bowel obstruction.    LYMPH NODES: Shotty retroperitoneal lymph nodes, nonspecific but may be reactive.    VASCULATURE: Atherosclerotic calcifications of the aortoiliac vessels without evidence of aneurysmal dilatation.    PELVIC ORGANS: Prominent prostate.    MUSCULOSKELETAL: Small to moderate sized umbilical hernia containing fat and a knuckle of small bowel. Right hip arthroplasty. Left femur intramedullary abdiel and screw. Degenerative changes of the left hip. Multilevel degenerative changes of the   cervicothoracic and lumbosacral spine. Dense bones overall, which is nonspecific but can be seen with renal osteodystrophy. No acute osseous abnormality.      Impression    IMPRESSION:    1.  Corresponding with the findings on same day chest radiograph is linear subsegmental atelectasis or scarring of the right lower lobe. There is also mild linear subsegmental atelectasis or scarring of the left lower lobe. No acute focal pulmonary   consolidation otherwise, or pleural effusion.  2.  Diffuse hepatic steatosis, which may be a source of abdominal pain.  3.  Percutaneous gastrostomy tube.  4.  No bowel obstruction.  5.  Colonic diverticulosis, without diverticulitis.

## 2025-07-07 NOTE — PLAN OF CARE
Goal Outcome Evaluation:      Plan of Care Reviewed With: patient    Overall Patient Progress: improvingOverall Patient Progress: improving     Pt is alert and orientated X4. Vitals stable. Slightly hypertensive. LS coarse, clears with cough. Pain rating 5/10, chronic. G-tube in place. Feedings and medications given via gravity. Garbled speech. +1 edema to BLE. SBA with gait belt and walker. Continent of bowel and bladder.

## 2025-07-07 NOTE — PHARMACY
[facility]  [facility] of [facility]  [address] [place] [id number]    July 7, 2025    Dear Pharmacist,    Your customer, Prasanna Alonso, born on 1955, was recently discharged from [facility].  We have updated his medication list and want to alert you to the following:       Review of your medicines        START taking        Dose / Directions   magnesium oxide 400 MG tablet  Commonly known as: MAG-OX      Dose: 400 mg  Place 1 tablet (400 mg) into Feeding Tube 2 times daily.  Quantity: 60 tablet  Refills: 0     traZODone 50 MG tablet  Commonly known as: DESYREL      Dose: 50 mg  Place 1 tablet (50 mg) into Feeding Tube at bedtime.  Quantity: 60 tablet  Refills: 0            CONTINUE these medicines which may have CHANGED, or have new prescriptions. If we are uncertain of the size of tablets/capsules you have at home, strength may be listed as something that might have changed.        Dose / Directions   famotidine 20 MG tablet  Commonly known as: PEPCID  Indication: Heartburn  This may have changed: how to take this  Used for: History of gastrointestinal bleeding      Dose: 20 mg  Place 1 tablet (20 mg) into Feeding Tube 2 times daily.  Quantity: 60 tablet  Refills: 0     ibuprofen 100 MG/5ML suspension  Commonly known as: ADVIL/MOTRIN  This may have changed:   how to take this  reasons to take this  Used for: Chronic pain syndrome      Dose: 20 mL  Take 20 mLs (400 mg) by mouth or Feeding Tube every 6 hours as needed for inflammatory pain.  Quantity: 273 mL  Refills: 2     multivitamin w/minerals tablet  This may have changed: how to take this  Used for: Alcohol abuse      Dose: 1 tablet  Take 1 tablet by mouth or Feeding Tube daily.  Quantity: 100 tablet  Refills: 0     oxyCODONE IR 10 MG tablet  Commonly known as: ROXICODONE  This may have changed: how to take this  Used for: Chronic pain syndrome, Chronic, continuous use of opioids, Malignant  neoplasm of the lip, oral cavity, and pharynx (H)      Dose: 10 mg  Take 1 tablet (10 mg) by mouth or Feeding Tube every 4 hours as needed for pain.  Quantity: 45 tablet  Refills: 0            CONTINUE these medicines which have NOT CHANGED        Dose / Directions   chlorhexidine 0.12 % solution  Commonly known as: PERIDEX      Dose: 15 mL  Take 15 mLs by mouth 2 times daily. Swish and spit  Refills: 0     fentaNYL 25 mcg/hr 72 hr patch  Commonly known as: DURAGESIC  Used for: Chronic pain syndrome, Chronic, continuous use of opioids, Malignant neoplasm of the lip, oral cavity, and pharynx (H)      Dose: 1 patch  Place 1 patch onto the skin every 72 hours.  Quantity: 10 patch  Refills: 0     sertraline 20 MG/ML (HIGH CONC) solution  Commonly known as: ZOLOFT  Used for: Mood disorder      Dose: 25 mg  Place 1.25 mLs (25 mg) into G tube daily.  Quantity: 60 mL  Refills: 1               Where to get your medicines        These medications were sent to [facility] [place] [address]      Phone: [phone]   famotidine 20 MG tablet  fentaNYL 25 mcg/hr 72 hr patch  ibuprofen 100 MG/5ML suspension  magnesium oxide 400 MG tablet  multivitamin w/minerals tablet  oxyCODONE IR 10 MG tablet  traZODone 50 MG tablet         We also reviewed Prasanna Alonso's allergy list and updated it as needed:  Allergies: Patient has no known allergies.    Thank you for continuing to care for Prasanna Alonso.  We look forward to working together with you in the future.    Sincerely,  Yudith Gleason, [facility]

## 2025-07-07 NOTE — PHARMACY - DISCHARGE MEDICATION RECONCILIATION AND EDUCATION
Pharmacy:  Discharge Counseling and Medication Reconciliation    Prasanna KRYSTIAN Alonso  [address]    [facility] [id number]  [phone] (home)   70 year old male  PCP: Ariel Vincent    Allergies: Patient has no known allergies.    Discharge Counseling:    Pharmacist did not meet with patient/family today as patient is discharging to the [facility] where all medications will be handled/administered for patient.      Discharge Medication Reconciliation:    It has been determined that the patient has an adequate supply of medications available or which can be obtained from the patient's preferred pharmacy, which HE/SHE has confirmed as: [facility] [An updated medication list will be faxed to the patient's pharmacy.]    Thank you for the consult.    Yudith Gleason RPH........July 7, 2025 7:59 AM

## 2025-07-07 NOTE — PLAN OF CARE
Goal Outcome Evaluation:       Patient A&O, given fentanyl patch and motrin for pain before discharge, tolerating tube feedings with no nausea or pain. Patient has no signs of withdrawal. Denies shortness  of breath, lung sounds clear, on room air. VVS, afebrile.patient has met goals for discharge. Discharging to [facility] for continued care, called with report.

## 2025-07-07 NOTE — PROGRESS NOTES
Patient was unable to get magnesium replacement due to loss of IV access this morning.  Discussed with pharmacy.  Patient will be getting oral magnesium daily after discharge.  No need to replace this morning per pharmacy.

## 2025-07-07 NOTE — PROGRESS NOTES
[facility] DISCHARGE NOTE    Patient discharged to acute rehab at 11:08 AM via wheel chair. Accompanied by sister and staff. Discharge instructions reviewed with patient, opportunity offered to ask questions. Prescriptions sent to patients preferred pharmacy. All belongings sent with patient.    Isatu Osuna RN

## 2025-07-07 NOTE — PROGRESS NOTES
:     Plan for patient to discharge to [facility] today for STR. Patients sister, Radha, will provide transportation for patient at 1100. Radha will also bring extra tube feedings for patient.     Update has been given to Christy at [facility].     No further needs from  at this time.     PATRICK Ma on 7/7/2025 at 9:30 AM

## 2025-07-08 ENCOUNTER — PATIENT OUTREACH (OUTPATIENT)
Dept: PEDIATRICS | Facility: OTHER | Age: 70
End: 2025-07-08
Payer: COMMERCIAL

## 2025-07-08 NOTE — TELEPHONE ENCOUNTER
Transitional Care Management    Patient discharged to skilled nursing facility for short term rehab. No TCM call required per policy.     Danyelle Montoya RN on 7/8/2025 at 8:53 AM

## (undated) DEVICE — ENDO SNARE EXACTO COLD 9MM LOOP 2.4MMX230CM 00711115

## (undated) DEVICE — Device

## (undated) DEVICE — ENDO BITE BLOCK 60 MAXI LF 00712804

## (undated) DEVICE — ESU ENDO FORCEP BX HOT FD-210U

## (undated) DEVICE — SUCTION MANIFOLD NEPTUNE 2 SYS 4 PORT 0702-020-000

## (undated) DEVICE — ENDO KIT COMPLIANCE DYKENDOCMPLY

## (undated) DEVICE — TUBING SUCTION 10'X3/16" N510

## (undated) DEVICE — SYR 50ML LL W/O NDL 309653

## (undated) DEVICE — SOL WATER 1500ML

## (undated) DEVICE — ENDO BRUSH CHANNEL MASTER CLEANING 2-4.2MM BW-412T

## (undated) RX ORDER — PROPOFOL 10 MG/ML
INJECTION, EMULSION INTRAVENOUS
Status: DISPENSED
Start: 2019-07-08

## (undated) RX ORDER — IPRATROPIUM BROMIDE AND ALBUTEROL SULFATE 2.5; .5 MG/3ML; MG/3ML
SOLUTION RESPIRATORY (INHALATION)
Status: DISPENSED
Start: 2025-06-30

## (undated) RX ORDER — AZITHROMYCIN 500 MG/5ML
INJECTION, POWDER, LYOPHILIZED, FOR SOLUTION INTRAVENOUS
Status: DISPENSED
Start: 2025-07-01

## (undated) RX ORDER — PHENYLEPHRINE HCL IN 0.9% NACL 1 MG/10 ML
SYRINGE (ML) INTRAVENOUS
Status: DISPENSED
Start: 2019-07-08

## (undated) RX ORDER — THIAMINE HYDROCHLORIDE 100 MG/ML
INJECTION, SOLUTION INTRAMUSCULAR; INTRAVENOUS
Status: DISPENSED
Start: 2019-07-04

## (undated) RX ORDER — PANTOPRAZOLE SODIUM 40 MG/10ML
INJECTION, POWDER, LYOPHILIZED, FOR SOLUTION INTRAVENOUS
Status: DISPENSED
Start: 2019-07-04

## (undated) RX ORDER — DIAZEPAM 10 MG/2ML
INJECTION, SOLUTION INTRAMUSCULAR; INTRAVENOUS
Status: DISPENSED
Start: 2025-07-01

## (undated) RX ORDER — SODIUM CHLORIDE 9 MG/ML
INJECTION, SOLUTION INTRAVENOUS
Status: DISPENSED
Start: 2019-07-04